# Patient Record
Sex: FEMALE | Race: WHITE | Employment: FULL TIME | URBAN - METROPOLITAN AREA
[De-identification: names, ages, dates, MRNs, and addresses within clinical notes are randomized per-mention and may not be internally consistent; named-entity substitution may affect disease eponyms.]

---

## 2020-03-04 ENCOUNTER — APPOINTMENT (OUTPATIENT)
Dept: NON INVASIVE DIAGNOSTICS | Age: 55
End: 2020-03-04
Payer: COMMERCIAL

## 2020-03-04 ENCOUNTER — APPOINTMENT (OUTPATIENT)
Dept: NUCLEAR MEDICINE | Age: 55
End: 2020-03-04
Payer: COMMERCIAL

## 2020-03-04 ENCOUNTER — HOSPITAL ENCOUNTER (OUTPATIENT)
Age: 55
Setting detail: OBSERVATION
Discharge: HOME | End: 2020-03-04
Attending: EMERGENCY MEDICINE | Admitting: INTERNAL MEDICINE
Payer: COMMERCIAL

## 2020-03-04 ENCOUNTER — APPOINTMENT (OUTPATIENT)
Dept: GENERAL RADIOLOGY | Age: 55
End: 2020-03-04
Payer: COMMERCIAL

## 2020-03-04 VITALS
BODY MASS INDEX: 38.57 KG/M2 | SYSTOLIC BLOOD PRESSURE: 146 MMHG | TEMPERATURE: 97.4 F | RESPIRATION RATE: 20 BRPM | OXYGEN SATURATION: 96 % | HEIGHT: 66 IN | HEART RATE: 67 BPM | DIASTOLIC BLOOD PRESSURE: 72 MMHG | WEIGHT: 240 LBS

## 2020-03-04 PROBLEM — Z87.891 FORMER SMOKER: Status: ACTIVE | Noted: 2020-03-04

## 2020-03-04 PROBLEM — I10 ESSENTIAL HYPERTENSION: Status: ACTIVE | Noted: 2020-03-04

## 2020-03-04 PROBLEM — E11.9 DIABETES MELLITUS (HCC): Status: ACTIVE | Noted: 2020-03-04

## 2020-03-04 PROBLEM — G47.30 SLEEP APNEA: Status: ACTIVE | Noted: 2020-03-04

## 2020-03-04 PROBLEM — R07.9 CHEST PAIN: Status: ACTIVE | Noted: 2020-03-04

## 2020-03-04 LAB
ANION GAP SERPL CALCULATED.3IONS-SCNC: 11 MMOL/L (ref 7–16)
BASOPHILS ABSOLUTE: 0.03 E9/L (ref 0–0.2)
BASOPHILS RELATIVE PERCENT: 0.5 % (ref 0–2)
BUN BLDV-MCNC: 17 MG/DL (ref 6–20)
CALCIUM SERPL-MCNC: 9.1 MG/DL (ref 8.6–10.2)
CHLORIDE BLD-SCNC: 105 MMOL/L (ref 98–107)
CHOLESTEROL, TOTAL: 110 MG/DL (ref 0–199)
CO2: 24 MMOL/L (ref 22–29)
CREAT SERPL-MCNC: 0.5 MG/DL (ref 0.5–1)
D DIMER: <200 NG/ML DDU
EKG ATRIAL RATE: 70 BPM
EKG P AXIS: 52 DEGREES
EKG P-R INTERVAL: 180 MS
EKG Q-T INTERVAL: 374 MS
EKG QRS DURATION: 82 MS
EKG QTC CALCULATION (BAZETT): 403 MS
EKG R AXIS: 6 DEGREES
EKG T AXIS: 20 DEGREES
EKG VENTRICULAR RATE: 70 BPM
EOSINOPHILS ABSOLUTE: 0.09 E9/L (ref 0.05–0.5)
EOSINOPHILS RELATIVE PERCENT: 1.5 % (ref 0–6)
GFR AFRICAN AMERICAN: >60
GFR NON-AFRICAN AMERICAN: >60 ML/MIN/1.73
GLUCOSE BLD-MCNC: 146 MG/DL (ref 74–99)
HCT VFR BLD CALC: 39.8 % (ref 34–48)
HDLC SERPL-MCNC: 49 MG/DL
HEMOGLOBIN: 13.3 G/DL (ref 11.5–15.5)
IMMATURE GRANULOCYTES #: 0.02 E9/L
IMMATURE GRANULOCYTES %: 0.3 % (ref 0–5)
INR BLD: 1
LACTIC ACID: 0.8 MMOL/L (ref 0.5–2.2)
LDL CHOLESTEROL CALCULATED: 52 MG/DL (ref 0–99)
LV EF: 78 %
LVEF MODALITY: NORMAL
LYMPHOCYTES ABSOLUTE: 1.51 E9/L (ref 1.5–4)
LYMPHOCYTES RELATIVE PERCENT: 25.3 % (ref 20–42)
MCH RBC QN AUTO: 30 PG (ref 26–35)
MCHC RBC AUTO-ENTMCNC: 33.4 % (ref 32–34.5)
MCV RBC AUTO: 89.6 FL (ref 80–99.9)
MONOCYTES ABSOLUTE: 0.34 E9/L (ref 0.1–0.95)
MONOCYTES RELATIVE PERCENT: 5.7 % (ref 2–12)
NEUTROPHILS ABSOLUTE: 3.99 E9/L (ref 1.8–7.3)
NEUTROPHILS RELATIVE PERCENT: 66.7 % (ref 43–80)
PDW BLD-RTO: 12.9 FL (ref 11.5–15)
PLATELET # BLD: 150 E9/L (ref 130–450)
PMV BLD AUTO: 9.9 FL (ref 7–12)
POTASSIUM REFLEX MAGNESIUM: 4 MMOL/L (ref 3.5–5)
PROTHROMBIN TIME: 11.1 SEC (ref 9.3–12.4)
RBC # BLD: 4.44 E12/L (ref 3.5–5.5)
SODIUM BLD-SCNC: 140 MMOL/L (ref 132–146)
TOTAL CK: 92 U/L (ref 20–180)
TRIGL SERPL-MCNC: 45 MG/DL (ref 0–149)
TROPONIN: <0.01 NG/ML (ref 0–0.03)
TROPONIN: <0.01 NG/ML (ref 0–0.03)
VLDLC SERPL CALC-MCNC: 9 MG/DL
WBC # BLD: 6 E9/L (ref 4.5–11.5)

## 2020-03-04 PROCEDURE — G0378 HOSPITAL OBSERVATION PER HR: HCPCS

## 2020-03-04 PROCEDURE — 94760 N-INVAS EAR/PLS OXIMETRY 1: CPT

## 2020-03-04 PROCEDURE — 93016 CV STRESS TEST SUPVJ ONLY: CPT | Performed by: INTERNAL MEDICINE

## 2020-03-04 PROCEDURE — 82550 ASSAY OF CK (CPK): CPT

## 2020-03-04 PROCEDURE — A9500 TC99M SESTAMIBI: HCPCS | Performed by: RADIOLOGY

## 2020-03-04 PROCEDURE — 99244 OFF/OP CNSLTJ NEW/EST MOD 40: CPT | Performed by: INTERNAL MEDICINE

## 2020-03-04 PROCEDURE — 6370000000 HC RX 637 (ALT 250 FOR IP): Performed by: EMERGENCY MEDICINE

## 2020-03-04 PROCEDURE — APPSS45 APP SPLIT SHARED TIME 31-45 MINUTES: Performed by: NURSE PRACTITIONER

## 2020-03-04 PROCEDURE — 85378 FIBRIN DEGRADE SEMIQUANT: CPT

## 2020-03-04 PROCEDURE — 80061 LIPID PANEL: CPT

## 2020-03-04 PROCEDURE — 99236 HOSP IP/OBS SAME DATE HI 85: CPT | Performed by: INTERNAL MEDICINE

## 2020-03-04 PROCEDURE — 93017 CV STRESS TEST TRACING ONLY: CPT

## 2020-03-04 PROCEDURE — 80048 BASIC METABOLIC PNL TOTAL CA: CPT

## 2020-03-04 PROCEDURE — 93018 CV STRESS TEST I&R ONLY: CPT | Performed by: INTERNAL MEDICINE

## 2020-03-04 PROCEDURE — 84484 ASSAY OF TROPONIN QUANT: CPT

## 2020-03-04 PROCEDURE — 36415 COLL VENOUS BLD VENIPUNCTURE: CPT

## 2020-03-04 PROCEDURE — 78452 HT MUSCLE IMAGE SPECT MULT: CPT

## 2020-03-04 PROCEDURE — 85610 PROTHROMBIN TIME: CPT

## 2020-03-04 PROCEDURE — 3430000000 HC RX DIAGNOSTIC RADIOPHARMACEUTICAL: Performed by: RADIOLOGY

## 2020-03-04 PROCEDURE — 93005 ELECTROCARDIOGRAM TRACING: CPT | Performed by: EMERGENCY MEDICINE

## 2020-03-04 PROCEDURE — 71046 X-RAY EXAM CHEST 2 VIEWS: CPT

## 2020-03-04 PROCEDURE — 85025 COMPLETE CBC W/AUTO DIFF WBC: CPT

## 2020-03-04 PROCEDURE — 99285 EMERGENCY DEPT VISIT HI MDM: CPT

## 2020-03-04 PROCEDURE — 83605 ASSAY OF LACTIC ACID: CPT

## 2020-03-04 RX ORDER — NITROGLYCERIN 0.4 MG/1
0.4 TABLET SUBLINGUAL ONCE
Status: COMPLETED | OUTPATIENT
Start: 2020-03-04 | End: 2020-03-04

## 2020-03-04 RX ORDER — SODIUM CHLORIDE 0.9 % (FLUSH) 0.9 %
SYRINGE (ML) INJECTION
Status: DISCONTINUED
Start: 2020-03-04 | End: 2020-03-04 | Stop reason: HOSPADM

## 2020-03-04 RX ORDER — ACETAMINOPHEN 500 MG
1000 TABLET ORAL ONCE
Status: COMPLETED | OUTPATIENT
Start: 2020-03-04 | End: 2020-03-04

## 2020-03-04 RX ORDER — ASPIRIN 325 MG
325 TABLET ORAL ONCE
Status: COMPLETED | OUTPATIENT
Start: 2020-03-04 | End: 2020-03-04

## 2020-03-04 RX ADMIN — Medication 10 MILLICURIE: at 14:34

## 2020-03-04 RX ADMIN — ACETAMINOPHEN 1000 MG: 500 TABLET ORAL at 09:52

## 2020-03-04 RX ADMIN — Medication 30 MILLICURIE: at 14:34

## 2020-03-04 RX ADMIN — ASPIRIN 325 MG: 325 TABLET, FILM COATED ORAL at 08:54

## 2020-03-04 RX ADMIN — NITROGLYCERIN 0.4 MG: 0.4 TABLET, ORALLY DISINTEGRATING SUBLINGUAL at 08:56

## 2020-03-04 ASSESSMENT — PAIN DESCRIPTION - FREQUENCY
FREQUENCY: INTERMITTENT
FREQUENCY: INTERMITTENT

## 2020-03-04 ASSESSMENT — PAIN SCALES - GENERAL
PAINLEVEL_OUTOF10: 1
PAINLEVEL_OUTOF10: 0
PAINLEVEL_OUTOF10: 1
PAINLEVEL_OUTOF10: 7

## 2020-03-04 ASSESSMENT — ENCOUNTER SYMPTOMS
DIARRHEA: 0
SINUS PAIN: 0
CHEST TIGHTNESS: 1
PHOTOPHOBIA: 0
ABDOMINAL PAIN: 0
SHORTNESS OF BREATH: 1
BACK PAIN: 0

## 2020-03-04 ASSESSMENT — PAIN DESCRIPTION - PAIN TYPE
TYPE: ACUTE PAIN
TYPE: ACUTE PAIN

## 2020-03-04 ASSESSMENT — PAIN DESCRIPTION - DESCRIPTORS: DESCRIPTORS: PATIENT UNABLE TO DESCRIBE

## 2020-03-04 ASSESSMENT — PAIN DESCRIPTION - LOCATION
LOCATION: CHEST
LOCATION: CHEST

## 2020-03-04 ASSESSMENT — PAIN DESCRIPTION - PROGRESSION
CLINICAL_PROGRESSION: NOT CHANGED
CLINICAL_PROGRESSION: NOT CHANGED

## 2020-03-04 NOTE — H&P
[facility]   History and Physical      CHIEF COMPLAINT:  Chest tightness/SOB    History of Present Illness:  48 y.o. female with a history of DM II ,MICKI, and HTN presents with chest tightness and SOB beginning this am. Pt complaint is mild in severity, intermittent, and worsened by nothing. Pt state last evening she developed a heart flutter \"like a butterfly\" that lasted for about 10 seconds or so. She awakened this morning with chest tightness and SOB. Pt states she had sleep study about 7 years ago, but has not been compliant with CPAP machine. She is a former smoker quit 2 years ago. Pt received Nitro Sl X 2 during ED course with relief of symptoms. Will admit for further evaluation and treatment. Informant(s) for H&P: Patient. Mom, and EMR    REVIEW OF SYSTEMS:  Pt admits to flutter of heart, SOB, and Chest tightness. Denies fevers, chills, n/v, ha, vision/hearing changes, wt changes, hot/cold flashes, other open skin lesions, diarrhea, constipation, dysuria/hematuria unless noted in HPI. Complete ROS performed with the patient and is otherwise negative. PMH:  Past Medical History:   Diagnosis Date    Diabetes mellitus ([address]     Essential hypertension     Essential tremor     Sleep apnea        Surgical History:  Past Surgical History:   Procedure Laterality Date     SECTION      FEMUR SURGERY Right        Medications Prior to Admission:    Prior to Admission medications    Not on File       Allergies:    Albuterol    Social History:          Family History:   family history includes Cancer in her father; Heart Attack in an other family member; Hypotension in her brother and mother; Hypothyroidism in her mother.      PHYSICAL EXAM:  Vitals:  BP (!) 146/72   Pulse 73   Temp 97.4 °F (36.3 °C)   Resp 14   Ht 5' 6\" (1.676 m)   Wt 240 lb (108.9 kg)   SpO2 96%   BMI 38.74 kg/m²     General Appearance: alert and oriented to person, place and time and in no acute distress  Skin: warm and dry  Head: normocephalic and atraumatic  Eyes: pupils equal, round, and reactive to light, extraocular eye movements intact, conjunctivae normal  Neck: neck supple and non tender without mass   Pulmonary/Chest: clear to auscultation bilaterally- no wheezes, rales or rhonchi, normal air movement, no respiratory distress  Cardiovascular: normal rate, normal S1 and S2. No rubs, gallops, or murmur noted. Abdomen: soft, non-tender, non-distended, normal bowel sounds, no masses or organomegaly  Extremities: no cyanosis, no clubbing. BLE edema  Neurologic: no cranial nerve deficit and speech normal    LABS:  Recent Labs     20  0847      K 4.0      CO2 24   BUN 17   CREATININE 0.5   GLUCOSE 146*   CALCIUM 9.1       Recent Labs     20  0847   WBC 6.0   RBC 4.44   HGB 13.3   HCT 39.8   MCV 89.6   MCH 30.0   MCHC 33.4   RDW 12.9      MPV 9.9      Ref. Range 3/4/2020 08:47   Total CK Latest Ref Range: 20 - 180 U/L 92   Troponin Latest Ref Range: 0.00 - 0.03 ng/mL <0.01       No results for input(s): POCGLU in the last 72 hours. Radiology: Xr Chest Standard (2 Vw)    Result Date: 3/4/2020  Patient MRN: [id number] : 1965 Age:  48 years Gender: Female Order Date: 3/4/2020 7:15 AM Exam: XR CHEST (2 VW) Number of Images: 2 views Indication:   chest pain chest pain Comparison: None. Findings: Examination of the chest in PA and lateral views shows that both lungs are free of active disease. The heart is normal in size and configuration. There is early uncoiling atherosclerotic change of thoracic aorta. The trachea is in the midline. The mediastinum and both hemidiaphragms are normal. The bony thorax is intact. NO ACUTE CARDIOPULMONARY PROCESS       EKG: Interpretation per ED Physician: SR no acute changes, nml axis    ASSESSMENT:      Active Problems:    Chest pain  Resolved Problems:    * No resolved hospital problems.  *      PLAN:    1. CP R/O ACS- Chest tightness/SOB this am. Nitro 0.4 Sl x 2 given in ED course. HEART score 3. Currently CP free. Troponin and EKG initially negative. Stress test today. Follow troponin. Lipid panel. Cardiology input appreciated. 2. HTN- Norvasc 5mg daily  3. OSAP-Sleep study about 7 years ago with compliance to CPAP about 1year. Currently non-compliant with CPAP at home. Counseled on compliance. 4. DM- Glucose 146. Currently on no medication. A1C.   5. Former smoker- Counseled continued cessation    Code Status: FULL  DVT prophylaxis: SCDs    NOTE: This report was transcribed using voice recognition software. Every effort was made to ensure accuracy; however, inadvertent computerized transcription errors may be present.     Electronically signed by Laverne Mendes.  Dar Pinzon - CNP on 3/4/2020 at 11:25 AM

## 2020-03-04 NOTE — CONSULTS
CHIEF COMPLAINT: Chest pain    HISTORY OF PRESENT ILLNESS: Patient is a 48 y.o. female seen at the request of Dr. Obi Plaza and not followed by cardiology. Patient complains of chest pain. Onset was 1 day ago, with improving course since that time. The patient describes the pain as intermittent, occurring with increasing frequency, precordial and pressure like in nature, does not radiate. Patient rates pain as a 5/10 in intensity. Associated symptoms are chest pain, chest pressure/discomfort and dyspnea. Aggravating factors are deep inspiration and position. Alleviating factors are: none. Patient's cardiac risk factors are diabetes mellitus, hypertension, obesity (BMI >= 30 kg/m2), sedentary lifestyle and smoking/ tobacco exposure. Past Medical History:   Diagnosis Date    [facility]     Essential hypertension     Essential tremor     Sleep apnea        Patient Active Problem List   Diagnosis    Chest pain, unspecified    [facility] mellitus [facility]    Essential hypertension    Sleep apnea    Former smoker       Allergies   Allergen Reactions    Albuterol        Current Facility-Administered Medications   Medication Dose Route Frequency Provider Last Rate Last Dose    sodium chloride flush 0.9 % injection             technetium sestamibi (CARDIOLITE) injection 10 millicurie  10 millicurie Intravenous ONCE PRN Jesenia Costa II, MD        technetium sestamibi (CARDIOLITE) injection 30 millicurie  30 millicurie Intravenous ONCE PRN John Leon MD         No current outpatient medications on file.        Social History     Socioeconomic History    Marital status:      Spouse name: Not on file    Number of children: Not on file    Years of education: Not on file    Highest education level: Not on file   Occupational History    Not on file   Social Needs    Financial resource strain: Not on file    Food insecurity:     Worry: Not on file     Inability: Not on file  Transportation needs:     Medical: Not on file     Non-medical: Not on file   Tobacco Use    Smoking status: Not on file   Substance and Sexual Activity    Alcohol use: Not on file    Drug use: Not on file    Sexual activity: Not on file   Lifestyle    Physical activity:     Days per week: Not on file     Minutes per session: Not on file    Stress: Not on file   Relationships    Social connections:     Talks on phone: Not on file     Gets together: Not on file     Attends Shinto service: Not on file     Active member of club or organization: Not on file     Attends meetings of clubs or organizations: Not on file     Relationship status: Not on file    Intimate partner violence:     Fear of current or ex partner: Not on file     Emotionally abused: Not on file     Physically abused: Not on file     Forced sexual activity: Not on file   Other Topics Concern    Not on file   Social History Narrative    Not on file       Family History   Problem Relation Age of Onset    Hypothyroidism Mother     Hypotension Mother     Cancer Father         gallbladder  52yr    Hypotension Brother     Heart Attack Other         Uncle on father's side  age 44       Review of Systems:   Heart: as above   Lungs: as above   Eyes: denies changes in vision or discharge. Ears: denies changes in hearing or pain. Nose: denies epistaxis or masses   Throat: denies sore throat or trouble swallowing. Neuro: denies numbness, tingling, tremors. Skin: denies rashes or itching. : denies hematuria, dysuria   GI: denies vomiting, diarrhea   Psych: denies mood changed, anxiety, depression. all others negative. Physical Exam   BP (!) 146/72   Pulse 67   Temp 97.4 °F (36.3 °C)   Resp 20   Ht 5' 6\" (1.676 m)   Wt 240 lb (108.9 kg)   SpO2 96%   BMI 38.74 kg/m²   Constitutional: Oriented to person, place, and time. Well-developed and well-nourished. No distress. Head: Normocephalic and atraumatic. Diagnosis    Chest pain, unspecified    Diabetes mellitus ([facility]    Essential hypertension    Sleep apnea    Former smoker     1. Chest pain: EKG and CE okay. D-dimer normal.    ESR. For stress. Echo.    2. DM: Per Primary service. 3. HTN: Observe. Melisa Goncalves D.O.   Cardiologist  Cardiology, [facility]

## 2020-03-04 NOTE — ED PROVIDER NOTES
Ann Ch is a 48 y.o. female presenting to the ED for chest pain, beginning this am ago. The complaint has been persistent, mild in severity, and worsened by nothing. 72-year-old female with history of diabetes and obstructive sleep apnea. She presents for chest tightness and shortness of breath that began this morning. She states the shortness of breath resolved she states the chest pain is very mild at this time she described as a tightness. She thinks the shortness of breath brought it on. She denies any exertional symptoms there is nothing reproducible to touch or rotation or movement. She denies any nausea vomiting denies any headache any neck pain any back pain any abdominal pain any diarrhea any melena hematochezia hemoptysis or hematemesis. She is never had a stress test.    Review of Systems:   Pertinent positives and negatives are stated within HPI, all other systems reviewed and are negative. Review of Systems   Constitutional: Negative for activity change and chills. HENT: Negative for congestion and sinus pain. Eyes: Negative for photophobia and visual disturbance. Respiratory: Positive for chest tightness and shortness of breath. Cardiovascular: Positive for chest pain and leg swelling. Gastrointestinal: Negative for abdominal pain and diarrhea. Genitourinary: Negative for difficulty urinating and flank pain. Musculoskeletal: Negative for back pain and neck pain. Neurological: Negative for dizziness, numbness and headaches. Hematological: Negative for adenopathy. Psychiatric/Behavioral: Negative for agitation and behavioral problems. + anxiety             --------------------------------------------- PAST HISTORY ---------------------------------------------  Past Medical History:  has a past medical history of [facility] Essential hypertension, Essential tremor, and Sleep apnea.     Past Surgical History:  has a past surgical history that includes Femur Surgery (Right) and  section. Social History:      Family History: family history is not on file. The patients home medications have been reviewed.     Allergies: Albuterol    -------------------------------------------------- RESULTS -------------------------------------------------  All laboratory and radiology results have been personally reviewed by myself   LABS:  Results for orders placed or performed during the hospital encounter of 20   Lactic Acid, Plasma   Result Value Ref Range    Lactic Acid 0.8 0.5 - 2.2 mmol/L   Protime-INR   Result Value Ref Range    Protime 11.1 9.3 - 12.4 sec    INR 1.0    D-Dimer, Quantitative   Result Value Ref Range    D-Dimer, Quant <200 ng/mL DDU   Troponin   Result Value Ref Range    Troponin <0.01 0.00 - 0.03 ng/mL   Basic Metabolic Panel w/ Reflex to MG   Result Value Ref Range    Sodium 140 132 - 146 mmol/L    Potassium reflex Magnesium 4.0 3.5 - 5.0 mmol/L    Chloride 105 98 - 107 mmol/L    CO2 24 22 - 29 mmol/L    Anion Gap 11 7 - 16 mmol/L    Glucose 146 (H) 74 - 99 mg/dL    BUN 17 6 - 20 mg/dL    CREATININE 0.5 0.5 - 1.0 mg/dL    GFR Non-African American >60 >=60 mL/min/1.73    GFR African American >60     Calcium 9.1 8.6 - 10.2 mg/dL   CBC Auto Differential   Result Value Ref Range    WBC 6.0 4.5 - 11.5 E9/L    RBC 4.44 3.50 - 5.50 E12/L    Hemoglobin 13.3 11.5 - 15.5 g/dL    Hematocrit 39.8 34.0 - 48.0 %    MCV 89.6 80.0 - 99.9 fL    MCH 30.0 26.0 - 35.0 pg    MCHC 33.4 32.0 - 34.5 %    RDW 12.9 11.5 - 15.0 fL    Platelets 887 018 - 005 E9/L    MPV 9.9 7.0 - 12.0 fL    Neutrophils % 66.7 43.0 - 80.0 %    Immature Granulocytes % 0.3 0.0 - 5.0 %    Lymphocytes % 25.3 20.0 - 42.0 %    Monocytes % 5.7 2.0 - 12.0 %    Eosinophils % 1.5 0.0 - 6.0 %    Basophils % 0.5 0.0 - 2.0 %    Neutrophils Absolute 3.99 1.80 - 7.30 E9/L    Immature Granulocytes # 0.02 E9/L    Lymphocytes Absolute 1.51 1.50 - 4.00 E9/L    Monocytes patient for All medications dispensed and given in department as well prescriptions prescribed for home, . The patient elected to take the medicine. Pt instructed on warning signs and precautions for medication side effects. The patient was given warning signs for when to seek medical attention. Counseling: The emergency provider has spoken with the patient and discussed todays results, in addition to providing specific details for the plan of care and counseling regarding the diagnosis and prognosis. Questions are answered at this time and they are agreeable with the plan.      --------------------------------- IMPRESSION AND DISPOSITION ---------------------------------    IMPRESSION  1. Chest pain, unspecified type        DISPOSITION  Disposition: Admit to telemetry  Patient condition is fair      NOTE: This report was transcribed using voice recognition software.  Every effort was made to ensure accuracy; however, inadvertent computerized transcription errors may be present       Omar Mayer DO  03/04/20 2780

## 2020-03-04 NOTE — DISCHARGE SUMMARY
[facility] Hospitalist       Hospitalist Physician Discharge Summary           Schedule an appointment as soon as possible for a visit  [phone] to make an appointment with a primary care doctor     DO Som[address]  [address] [facility],[address] [phone]    In 1 week  Call for follow up appointment      Activity level: AS tolerated    Diet: Carb controlled         Condition at discharge: astable    Dispo:Home    Patient ID:  Ambreen Stewart  [id number]  48 y.o.  1965    Admit date: 3/4/2020    Discharge date and time:  3/4/2020  5:06 PM    Admission Diagnoses: Principal Problem:    Chest pain, unspecified  Active Problems:    Diabetes mellitus ([address]    Essential hypertension    Sleep apnea    Former smoker  Resolved Problems:    * No resolved hospital problems. *      Discharge Diagnoses: Principal Problem:    Chest pain, unspecified  Active Problems:    Diabetes mellitus ([address]    Essential hypertension    Sleep apnea    Former smoker  Resolved Problems:    * No resolved hospital problems. *      Consults:  IP CONSULT TO CARDIOLOGY    Procedures:     Exercise Treadmill Test Stress Report:     Dx CP     Baseline EKG: normal sinus rhythm, nonspecific ST and T waves changes.     Workload: 5:59 minutes to 150 BPM which represents 89% of MPHR. 6.9 METs     Stress EKG: No ST-T changes.     Arrhythmias: None.      Symptoms: None.     Huber Treadmill score is 6.     Summary:  Negative ETT to an average workload.     Huber treadmill score is 6 portending a low risk of cardiovascular event.     Conchis Bassett D.O. Cardiologist  Cardiology, [facility] Course:     48year old  female with a history of DM, MICKI, and HTN presented to [facility] ED with complaints of chest tightness and SOB. Beginning this am.Pt state last evening she developed a heart flutter \"like a butterfly\" that lasted for about 10 seconds or so.  She awakened this morning with chest Gender: Female Order Date: 3/4/2020 7:15 AM Exam: XR CHEST (2 VW) Number of Images: 2 views Indication:   chest pain chest pain Comparison: None. Findings: Examination of the chest in PA and lateral views shows that both lungs are free of active disease. The heart is normal in size and configuration. There is early uncoiling atherosclerotic change of thoracic aorta. The trachea is in the midline. The mediastinum and both hemidiaphragms are normal. The bony thorax is intact. NO ACUTE CARDIOPULMONARY PROCESS     Nm Cardiac Stress Test Nuclear Imaging    Result Date: 3/4/2020  Clinical indications: Chest pain. Myocardial infarction. COMPARISON: None. Exposure control: This examination and all examinations utilizing ionizing radiation at this facility done so according to the ALARA (as low as reasonably achievable) principal for radiation dose reduction. TECHNIQUE: Nuclear medicine SPECT, wall motion, cardiac perfusion and ejection fraction study. For the rest portion of the examination, 11.5 mCi of technetium 99 sestamibi were injected intravenously into the right forearm IV. For the stress portion of the examination, 31.3 mCi of technetium 99 sestamibi were injected into the right forearm IV. This was accompanied by treadmill testing using the Johnathan protocol. FINDINGS: There is no evidence for fixed or reversible perfusion defect. Wall motion is normal as is ejection fraction at 78%     Normal myocardial perfusion, ejection fraction and wall motion study. Patient Instructions:   Current Discharge Medication List      CONTINUE these medications which have NOT CHANGED    Details   metFORMIN (GLUCOPHAGE) 500 MG tablet Take 500 mg by mouth 2 times daily (with meals)               Note that More than 30 minutes was spent in preparing discharge papers, discussing discharge with patient, medication review, etc.    NOTE: This report was transcribed using voice recognition software.  Every effort was made to ensure

## 2020-03-04 NOTE — PROGRESS NOTES
[facility] Med Surg/Tele  [address]  [facility] [address]  Phone: [phone]             March 4, 2020    Patient: Ann Ch   YOB: 1965   Date of Visit: 3/4/2020       To Whom It May Concern:    Ann Ch was seen and treated in our facility  beginning 3/4/2020 until 3/4/2020 @4461       Sincerely,       Andrews Hernández RN         Signature:__________________________________

## 2020-03-31 ENCOUNTER — TELEPHONE (OUTPATIENT)
Dept: PRIMARY CARE CLINIC | Age: 55
End: 2020-03-31

## 2020-04-27 ENCOUNTER — OFFICE VISIT (OUTPATIENT)
Dept: FAMILY MEDICINE CLINIC | Age: 55
End: 2020-04-27
Payer: COMMERCIAL

## 2020-04-27 VITALS
SYSTOLIC BLOOD PRESSURE: 122 MMHG | BODY MASS INDEX: 34.54 KG/M2 | DIASTOLIC BLOOD PRESSURE: 68 MMHG | TEMPERATURE: 97.5 F | HEART RATE: 75 BPM | OXYGEN SATURATION: 97 % | WEIGHT: 214 LBS

## 2020-04-27 PROCEDURE — G8417 CALC BMI ABV UP PARAM F/U: HCPCS | Performed by: FAMILY MEDICINE

## 2020-04-27 PROCEDURE — 99213 OFFICE O/P EST LOW 20 MIN: CPT | Performed by: FAMILY MEDICINE

## 2020-04-27 PROCEDURE — 4004F PT TOBACCO SCREEN RCVD TLK: CPT | Performed by: FAMILY MEDICINE

## 2020-04-27 PROCEDURE — G8427 DOCREV CUR MEDS BY ELIG CLIN: HCPCS | Performed by: FAMILY MEDICINE

## 2020-04-27 PROCEDURE — 3017F COLORECTAL CA SCREEN DOC REV: CPT | Performed by: FAMILY MEDICINE

## 2020-04-27 ASSESSMENT — VISUAL ACUITY: OU: 1

## 2020-04-27 ASSESSMENT — ENCOUNTER SYMPTOMS
RESPIRATORY NEGATIVE: 1
PHOTOPHOBIA: 1
EYE REDNESS: 1
EYE PAIN: 1

## 2020-04-27 NOTE — PROGRESS NOTES
20  Brinda Lobo : 1965 Sex: female  Age: 48 y.o. Chief Complaint   Patient presents with    Eye Pain     after cleaning saturday/ red irritated       This patient is a 53-year-old white female with a chief complaint of eye pain after cleaning Saturday which was 2 days prior to this visit. The area of the eye is irritated and extremely erythematous and painful. Has had no fevers chills cough or shortness of breath no discharge from the eye. Review of Systems   Constitutional: Negative. Eyes: Positive for photophobia, pain, redness and visual disturbance. Respiratory: Negative. Current Outpatient Medications:     Tobramycin-Dexamethasone 0.3-0.05 % SUSP, Apply 1 drop to eye 2 times daily, Disp: 1 Bottle, Rfl: 0    metFORMIN (GLUCOPHAGE) 500 MG tablet, Take 500 mg by mouth 2 times daily (with meals), Disp: , Rfl:   Allergies   Allergen Reactions    Albuterol        Past Medical History:   Diagnosis Date    Diabetes mellitus ([facility]     Essential hypertension     Essential tremor     Sleep apnea      Past Surgical History:   Procedure Laterality Date     SECTION      FEMUR SURGERY Right      Family History   Problem Relation Age of Onset    Hypothyroidism Mother     Hypotension Mother     Cancer Father         gallbladder  52yr    Hypotension Brother     Heart Attack Other         Uncle on father's side  age 44     Social History     Tobacco Use    Smoking status: Former Smoker     Last attempt to quit: 3/4/2018     Years since quittin.1   Substance Use Topics    Alcohol use: Not on file    Drug use: Not on file        Vitals:    20 0948   BP: 122/68   Pulse: 75   Temp: 97.5 °F (36.4 °C)   SpO2: 97%   Weight: 214 lb (97.1 kg)       Physical Exam  Constitutional:       Appearance: Normal appearance. Eyes:      General: Lids are normal. Lids are everted, no foreign bodies appreciated. Vision grossly intact.          Right

## 2020-06-11 ENCOUNTER — TELEPHONE (OUTPATIENT)
Dept: ADMINISTRATIVE | Age: 55
End: 2020-06-11

## 2020-06-11 NOTE — TELEPHONE ENCOUNTER
Patient called in asking if she can establish care with Dr. Mario Gomez. Her mother is a current patient of Dr Kandis Castro. Patient's prior PCP is . Please advise if patient may establish.       Thank you    brigid

## 2020-07-07 ENCOUNTER — TELEPHONE (OUTPATIENT)
Dept: ADMINISTRATIVE | Age: 55
End: 2020-07-07

## 2020-07-07 ENCOUNTER — NURSE TRIAGE (OUTPATIENT)
Dept: OTHER | Facility: CLINIC | Age: 55
End: 2020-07-07

## 2020-07-07 ENCOUNTER — OFFICE VISIT (OUTPATIENT)
Dept: FAMILY MEDICINE CLINIC | Age: 55
End: 2020-07-07
Payer: COMMERCIAL

## 2020-07-07 ENCOUNTER — HOSPITAL ENCOUNTER (OUTPATIENT)
Age: 55
Discharge: HOME | End: 2020-07-09
Payer: COMMERCIAL

## 2020-07-07 VITALS
WEIGHT: 243 LBS | RESPIRATION RATE: 20 BRPM | DIASTOLIC BLOOD PRESSURE: 84 MMHG | BODY MASS INDEX: 39.05 KG/M2 | TEMPERATURE: 97.5 F | HEART RATE: 90 BPM | HEIGHT: 66 IN | OXYGEN SATURATION: 98 % | SYSTOLIC BLOOD PRESSURE: 144 MMHG

## 2020-07-07 LAB
ALBUMIN SERPL-MCNC: 4.6 G/DL (ref 3.5–5.2)
ALP BLD-CCNC: 69 U/L (ref 35–104)
ALT SERPL-CCNC: 36 U/L (ref 0–32)
ANION GAP SERPL CALCULATED.3IONS-SCNC: 15 MMOL/L (ref 7–16)
AST SERPL-CCNC: 36 U/L (ref 0–31)
BASOPHILS ABSOLUTE: 0.03 E9/L (ref 0–0.2)
BASOPHILS RELATIVE PERCENT: 0.4 % (ref 0–2)
BILIRUB SERPL-MCNC: 0.5 MG/DL (ref 0–1.2)
BUN BLDV-MCNC: 15 MG/DL (ref 6–20)
CALCIUM SERPL-MCNC: 9.3 MG/DL (ref 8.6–10.2)
CHLORIDE BLD-SCNC: 103 MMOL/L (ref 98–107)
CO2: 24 MMOL/L (ref 22–29)
CREAT SERPL-MCNC: 0.6 MG/DL (ref 0.5–1)
EOSINOPHILS ABSOLUTE: 0.09 E9/L (ref 0.05–0.5)
EOSINOPHILS RELATIVE PERCENT: 1.3 % (ref 0–6)
GFR AFRICAN AMERICAN: >60
GFR NON-AFRICAN AMERICAN: >60 ML/MIN/1.73
GLUCOSE BLD-MCNC: 162 MG/DL (ref 74–99)
HCT VFR BLD CALC: 45 % (ref 34–48)
HEMOGLOBIN: 14.5 G/DL (ref 11.5–15.5)
IMMATURE GRANULOCYTES #: 0.04 E9/L
IMMATURE GRANULOCYTES %: 0.6 % (ref 0–5)
LYMPHOCYTES ABSOLUTE: 1.17 E9/L (ref 1.5–4)
LYMPHOCYTES RELATIVE PERCENT: 16.4 % (ref 20–42)
MCH RBC QN AUTO: 29.7 PG (ref 26–35)
MCHC RBC AUTO-ENTMCNC: 32.2 % (ref 32–34.5)
MCV RBC AUTO: 92 FL (ref 80–99.9)
MONOCYTES ABSOLUTE: 0.32 E9/L (ref 0.1–0.95)
MONOCYTES RELATIVE PERCENT: 4.5 % (ref 2–12)
NEUTROPHILS ABSOLUTE: 5.49 E9/L (ref 1.8–7.3)
NEUTROPHILS RELATIVE PERCENT: 76.8 % (ref 43–80)
PDW BLD-RTO: 13.2 FL (ref 11.5–15)
PLATELET # BLD: 170 E9/L (ref 130–450)
PMV BLD AUTO: 11.2 FL (ref 7–12)
POTASSIUM SERPL-SCNC: 4.6 MMOL/L (ref 3.5–5)
RBC # BLD: 4.89 E12/L (ref 3.5–5.5)
SODIUM BLD-SCNC: 142 MMOL/L (ref 132–146)
TOTAL PROTEIN: 6.9 G/DL (ref 6.4–8.3)
TSH SERPL DL<=0.05 MIU/L-ACNC: 0.7 UIU/ML (ref 0.27–4.2)
WBC # BLD: 7.1 E9/L (ref 4.5–11.5)

## 2020-07-07 PROCEDURE — 3017F COLORECTAL CA SCREEN DOC REV: CPT | Performed by: PHYSICIAN ASSISTANT

## 2020-07-07 PROCEDURE — 84443 ASSAY THYROID STIM HORMONE: CPT

## 2020-07-07 PROCEDURE — 80053 COMPREHEN METABOLIC PANEL: CPT

## 2020-07-07 PROCEDURE — G8417 CALC BMI ABV UP PARAM F/U: HCPCS | Performed by: PHYSICIAN ASSISTANT

## 2020-07-07 PROCEDURE — 36415 COLL VENOUS BLD VENIPUNCTURE: CPT

## 2020-07-07 PROCEDURE — 85025 COMPLETE CBC W/AUTO DIFF WBC: CPT

## 2020-07-07 PROCEDURE — 99214 OFFICE O/P EST MOD 30 MIN: CPT | Performed by: PHYSICIAN ASSISTANT

## 2020-07-07 PROCEDURE — G8427 DOCREV CUR MEDS BY ELIG CLIN: HCPCS | Performed by: PHYSICIAN ASSISTANT

## 2020-07-07 PROCEDURE — 4004F PT TOBACCO SCREEN RCVD TLK: CPT | Performed by: PHYSICIAN ASSISTANT

## 2020-07-07 RX ORDER — HYDROXYZINE PAMOATE 25 MG/1
25 CAPSULE ORAL 3 TIMES DAILY PRN
Qty: 30 CAPSULE | Refills: 0 | Status: SHIPPED | OUTPATIENT
Start: 2020-07-07 | End: 2020-07-17

## 2020-07-07 SDOH — HEALTH STABILITY: MENTAL HEALTH: HOW OFTEN DO YOU HAVE A DRINK CONTAINING ALCOHOL?: 2-4 TIMES A MONTH

## 2020-07-07 NOTE — TELEPHONE ENCOUNTER
been any new stress or recent changes in your life? \"       Birthday   11. CAFFEINE ABUSE: \"Do you drink caffeinated beverages, and how much each day? \" (e.g., coffee, tea, jarrell)        Drinks energy drinks  12. SUBSTANCE ABUSE: \"Do you use any illegal drugs or alcohol? \"       Doesn't usually drink, had some drinks last weekend  13. OTHER SYMPTOMS: \"Do you have any other physical symptoms right now? \" (e.g., chest pain, palpitations, difficulty breathing, fever)        Feels closed in at home   14. PREGNANCY: \"Is there any chance you are pregnant? \" \"When was your last menstrual period? \"        no    Protocols used: ANXIETY AND PANIC ATTACK-ADULT-[place]    Went to see daughter in [facility] and started having anxiety attacks. Woke up today very anxious. Has an appointment on July 20th. With new PCP Recommend to go to the [place] [facility] to get some help today and she agrees. .      Received call from [facility]. Please do not reply to the triage nurse through this encounter. Any subsequent communication should be directly with the patient.

## 2020-07-07 NOTE — LETTER
[place] [address]  Phone: [phone]  Fax: [place], [place]        July 10, 2020     Patient: Jose Dunlap   YOB: 1965   Date of Visit: 7/7/2020       To Whom It May Concern: It is my medical opinion that Jose Dunlap Patient was seen and evaluated in the office on 7/7/2020. If you have any questions or concerns, please don't hesitate to call.     Sincerely,        Amrita Henry III PA

## 2020-07-07 NOTE — PROGRESS NOTES
20  Jt Hickman : 1965 Sex: female  Age 48 y.o. Subjective:  Chief Complaint   Patient presents with    Anxiety     pt states she has been having anxiety starting in [facility]         HPI:   Jt Hickman , 48 y.o. female presents to [facility] care for evaluation of anxiety. The patient has a history of diabetes, hypertension, essential tremor and sleep apnea. The patient is not currently on any medication because her PCP had passed away about a year ago. The patient has been experiencing these panic attacks and anxiety attacks for about 5 to 6 months now. Patient was actually admitted in February with similar complaints when she was having some chest discomfort. The patient states that everything looked appropriate at the time and was discharged home. They never started on any medication. She has an appointment on 2020 for a follow-up. The patient is not having any fever, chills, nausea, vomiting. The patient states that she got worked up over the weekend. She was down visiting her daughter in [facility]. She did have some alcoholic beverages which is more than she typically does. The patient does not use any illicit drugs. The patient denies any suicidal homicidal ideation. The patient's not having any hallucinations. She is never been diagnosed with anxiety or panic attacks in the past.      ROS:   Unless otherwise stated in this report the patient's positive and negative responses for review of systems for constitutional, eyes, ENT, cardiovascular, respiratory, gastrointestinal, neurological, , musculoskeletal, and integument systems and related systems to the presenting problem are either stated in the history of present illness or were not pertinent or were negative for the symptoms and/or complaints related to the presenting medical problem. Positives and pertinent negatives as per HPI. All others reviewed and are negative.       PMH:     Past Medical History: Diagnosis Date    Diabetes mellitus ([facility] [address]     Essential hypertension     Essential tremor     Sleep apnea        Past Surgical History:   Procedure Laterality Date     SECTION      FEMUR SURGERY Right        Family History   Problem Relation Age of Onset    Hypothyroidism Mother     Hypotension Mother     Cancer Father         gallbladder  52yr    Hypotension Brother     Heart Attack Other         Uncle on father's side  age 44       Medications:   No current outpatient medications on file. Allergies: Allergies   Allergen Reactions    Albuterol        Social History:     Social History     Tobacco Use    Smoking status: Former Smoker     Last attempt to quit: 3/4/2018     Years since quittin.3    Smokeless tobacco: Current User   Substance Use Topics    Alcohol use: Yes     Frequency: 2-4 times a month    Drug use: Never       Patient lives at home. Physical Exam:     Vitals:    20 1030   BP: (!) 144/84   Pulse: 90   Resp: 20   Temp: 97.5 °F (36.4 °C)   SpO2: 98%   Weight: 243 lb (110.2 kg)   Height: 5' 6\" (1.676 m)       Exam:  Physical Exam  Nurses note and vital signs reviewed and patient is not hypoxic. General: The patient appears well and in no apparent distress. Patient is resting comfortably on cart. Skin: Warm, dry, no pallor noted. There is no rash noted. Head: Normocephalic, atraumatic. Eye: Normal conjunctiva  Ears, Nose, Mouth, and Throat: Oral mucosa is moist  Cardiovascular: Regular Rate and Rhythm  Respiratory: Patient is in no distress, no accessory muscle use, lungs are clear to auscultation, no wheezing, crackles or rhonchi  Back: Non-tender, no CVA tenderness bilaterally to percussion. GI: Normal bowel sounds, no tenderness to palpation, no masses appreciated. No rebound, guarding, or rigidity noted.   Musculoskeletal: The patient has no evidence of calf tenderness, no pitting edema, symmetrical pulses noted bilaterally  Neurological: A&O x4, normal speech  Psychological: The patient has good insight, she was tearful initially but the symptoms seem to improve, no homicidal or suicidal ideation, no hallucinations. Testing:     Laboratory studies pending      Medical Decision Making:     The patient on arrival does not appear to be in any apparent distress or discomfort. Vital signs reviewed and noted to be within normal limits. The patient initially was very tearful. The patient states that she has been going through some anxiety and panic attacks as of late. There is nothing seems to be triggering these. The patient does have a job at [employer] and states that she is an over [employer]. The patient likes her job. The patient lives at home with her mother. The patient does not do any illicit drugs. The patient is not suicidal homicidal at this time. The patient will have baseline laboratory studies performed. The patient will be started on Vistaril. We will have the patient follow-up with PCP. The patient states that she does not like the idea of being on a long-term medication. The patient was also recommended to go to therapy. The patient was offered a referral and states that she was given the name and number by the on-call service. The patient additionally was recommended to start exercising at least 30 minutes a day, modifying diet, and meditation. The patient understands plan has no other questions or concerns at this time. Clinical Impression:   Jennifer BAILEY was seen today for anxiety. Diagnoses and all orders for this visit:    Anxiety  -     CBC Auto Differential; Future  -     COMPREHENSIVE METABOLIC PANEL; Future  -     TSH; Future    Other orders  -     hydrOXYzine (VISTARIL) 25 MG capsule; Take 1 capsule by mouth 3 times daily as needed for Anxiety        The patient is to call for any concerns or return if any of the signs or symptoms worsen.  The patient is to follow-up with PCP in the next 2-3 days for repeat evaluation repeat assessment or go directly to the emergency department.      SIGNATURE: Kenyetta Salcedo III, PA-C    Greater than 50% of the time was spent face-to-face with the patient discussing different treatment options, differential diagnosis, obtaining laboratory studies, treatment plan, need for follow-up, and lifestyle modifications

## 2020-07-09 ENCOUNTER — TELEPHONE (OUTPATIENT)
Dept: FAMILY MEDICINE CLINIC | Age: 55
End: 2020-07-09

## 2020-07-09 ENCOUNTER — HOSPITAL ENCOUNTER (EMERGENCY)
Age: 55
Discharge: HOME | End: 2020-07-09
Payer: COMMERCIAL

## 2020-07-09 VITALS
TEMPERATURE: 97.5 F | OXYGEN SATURATION: 97 % | HEIGHT: 66 IN | DIASTOLIC BLOOD PRESSURE: 91 MMHG | BODY MASS INDEX: 38.09 KG/M2 | RESPIRATION RATE: 16 BRPM | SYSTOLIC BLOOD PRESSURE: 140 MMHG | WEIGHT: 237 LBS | HEART RATE: 80 BPM

## 2020-07-09 PROCEDURE — 99282 EMERGENCY DEPT VISIT SF MDM: CPT

## 2020-07-09 PROCEDURE — 6370000000 HC RX 637 (ALT 250 FOR IP): Performed by: PHYSICIAN ASSISTANT

## 2020-07-09 RX ORDER — LORAZEPAM 1 MG/1
.5-1 TABLET ORAL EVERY 8 HOURS PRN
Qty: 15 TABLET | Refills: 0 | Status: SHIPPED | OUTPATIENT
Start: 2020-07-09 | End: 2020-07-14

## 2020-07-09 RX ORDER — LORAZEPAM 1 MG/1
1 TABLET ORAL ONCE
Status: COMPLETED | OUTPATIENT
Start: 2020-07-09 | End: 2020-07-09

## 2020-07-09 RX ADMIN — LORAZEPAM 1 MG: 1 TABLET ORAL at 15:05

## 2020-07-09 NOTE — ED PROVIDER NOTES
[facility]     Department of Emergency Medicine   ED  Provider Note  Admit Date/RoomTime: 2020  1:46 PM  ED Room:      Chief Complaint   Anxiety (states ongoing- has an ben on the 25 to see doctor. states having panic attacks. was given vistiril but not really better Denies SI/HI)    History of Present Illness      Adithya Banks is a 48 y.o. old female who presents to the ED for anxiety. Patient states she does have a history of this but has never seen a therapist.  She states she used to get frequent panic attacks several years ago. .  She is currently taking Vistaril every 8 hours. She states she was prescribed this by [facility] urgent care as she cannot get into her new family doctor Eze Quinn) until . Patient is denying any suicidal or homicidal ideation. She has no auditory visual hallucinations. Patient is denying chest pain, shortness of breath, abdominal pain, nausea vomiting, diarrhea, limb pain or swelling, fever/chills, cough, congestion, rash, headache, dizziness, or recent trauma/injury. She is alert and oriented x3 and in no apparent distress at this exam.  Patient is mildly anxious. She is cooperative and nontoxic-appearing. Patient states she is just looking for outpatient referrals. ROS   Pertinent positives and negatives are stated within HPI, all other systems reviewed and are negative. Past Medical History:  has a past medical history of Diabetes mellitus ([address] Essential hypertension, Essential tremor, and Sleep apnea. Past Surgical History:  has a past surgical history that includes Femur Surgery (Right) and  section. Social History:  reports that she quit smoking about 2 years ago. She uses smokeless tobacco. She reports current alcohol use. She reports that she does not use drugs.     Family History: family history includes Cancer in her father; Heart Attack in an other family member; Hypotension in her brother and mother; Hypothyroidism in her mother. The patients home medications have been reviewed. Allergies: Albuterol  Allergies have been reviewed with patient. Physical Exam   VS:  BP (!) 140/91   Pulse 80   Temp 97.5 °F (36.4 °C)   Resp 16   Ht 5' 6\" (1.676 m)   Wt 237 lb (107.5 kg)   SpO2 97%   BMI 38.25 kg/m²      Oxygen Saturation Interpretation: Normal.    General Appearance:  well-appearing, street clothes, fair grooming and fair hygiene. Constitutional: Alert and oriented x4, NAD, calm and cooperative     HEENT: Non-traumatic, No bruising or lacerations noted, EOMI, Airway patient. Neck: Supple. Non-tender with no meningeal signs    Respiratory:  Clear to auscultation and breath sounds equal. No distress   CV:  Regular rate and rhythm  GI:  Abdomen soft, nontender, No guarding or rigidity   Back:  No costovertebral tenderness. No midline tenderness   Integument:  Normal turgor. Warm, dry, without visible rash, unless noted elsewhere. Extremities: No tenderness or edema bilaterally   Neurological: CN II-XII grossly intact, Motor functions intact. Psychiatric:        Thought Process:       Coherent:  Yes. Delusions / Paranoia: no evidence of delusions and no evidence of paranoia. Flight of ideas:  No.         Rambling conversation:  No.       Affect: anxious. Suicidal ideation:  no suicidal ideation. Homicidal ideation:  no homicidal ideation. Perceptions:  denies any perceptual disturbance present. Lab / Imaging Results   (All laboratory and radiology results have been personally reviewed by myself)  Labs:  No results found for this visit on 07/09/20. Imaging: All Radiology results interpreted by Radiologist unless otherwise noted. No orders to display     ED Course / Medical Decision Making     Medications   LORazepam (ATIVAN) tablet 1 mg (1 mg Oral Given 7/9/20 4758)     Re-examination:   Patients symptoms are improving. She is resting comfortably in bed with no distress.  She continues to deny SI/HI     Consult(s):  IP CONSULT TO SOCIAL WORK    Procedure(s):  None    MDM:       Counseling: The emergency provider has spoken with the patient/caregiver and/or jacoban and discussed todays results, in addition to providing specific details for the plan of care and counseling regarding the diagnosis and prognosis. Questions are answered at this time and they are agreeable with the plan. Patient understands they must follow up with PCP and psychiatry. They were advised to return to the ED if symptoms worsen or if new symptoms develop. Pt remained nontoxic, afebrile, and A&O x4 during this ED visit. They agreed with plan of care, discharge, and importance of follow-up. Pt was in no distress at discharge. Vitals stable. Patient was educated on newly prescribed medications. At this time the patient is without objective evidence of an acute process requiring hospitalization or inpatient management. They have remained hemodynamically stable throughout their entire ED visit and are stable for discharge with outpatient follow-up. Assessment      1. Generalized anxiety disorder with panic attacks      Plan   Discharge to home  Patient condition is good    New Medications     New Prescriptions    LORAZEPAM (ATIVAN) 1 MG TABLET    Take 0.5-1 tablets by mouth every 8 hours as needed for Anxiety for up to 5 days. Electronically signed by Skylar Quarles PA-C   DD: 7/9/20  **This report was transcribed using voice recognition software. Every effort was made to ensure accuracy; however, inadvertent computerized transcription errors may be present.     END OF ED PROVIDER NOTE        Skylar Quarles PA-C  07/09/20 1544

## 2020-07-09 NOTE — TELEPHONE ENCOUNTER
If the patient is experiencing all of the symptoms I would recommend the emergency department for further evaluation and assessment. Her laboratory studies were unremarkable. Other than that can you check to see if her PCP has any availability soon and to see her?

## 2020-07-09 NOTE — ED NOTES
Assumed care of patient. Intyroduced self to patient as RN. Patient states she feels ok now.  Awaiting disposition and      Ekaterina Marcos RN  07/09/20 8677

## 2020-07-09 NOTE — TELEPHONE ENCOUNTER
Rina calling to tell you that she is not doing well right now. The medication you gave her kicks in, but doesn't last long, and then she is sobbing and anxious and scared. She cant get in to see Heidy Tse for another 11 days.   What do you suggest?

## 2020-07-20 ENCOUNTER — OFFICE VISIT (OUTPATIENT)
Dept: FAMILY MEDICINE CLINIC | Age: 55
End: 2020-07-20
Payer: COMMERCIAL

## 2020-07-20 ENCOUNTER — HOSPITAL ENCOUNTER (OUTPATIENT)
Age: 55
Discharge: HOME | End: 2020-07-22
Payer: COMMERCIAL

## 2020-07-20 VITALS
HEART RATE: 72 BPM | SYSTOLIC BLOOD PRESSURE: 158 MMHG | OXYGEN SATURATION: 98 % | DIASTOLIC BLOOD PRESSURE: 100 MMHG | BODY MASS INDEX: 39.13 KG/M2 | TEMPERATURE: 97.9 F | WEIGHT: 243.5 LBS | HEIGHT: 66 IN

## 2020-07-20 PROBLEM — F41.1 GENERALIZED ANXIETY DISORDER: Status: ACTIVE | Noted: 2020-07-20

## 2020-07-20 PROBLEM — E66.09 CLASS 2 OBESITY DUE TO EXCESS CALORIES WITHOUT SERIOUS COMORBIDITY WITH BODY MASS INDEX (BMI) OF 39.0 TO 39.9 IN ADULT: Status: ACTIVE | Noted: 2020-07-20

## 2020-07-20 LAB
ALBUMIN SERPL-MCNC: 4.3 G/DL (ref 3.5–5.2)
ALP BLD-CCNC: 64 U/L (ref 35–104)
ALT SERPL-CCNC: 25 U/L (ref 0–32)
ANION GAP SERPL CALCULATED.3IONS-SCNC: 13 MMOL/L (ref 7–16)
AST SERPL-CCNC: 21 U/L (ref 0–31)
BACTERIA: ABNORMAL /HPF
BASOPHILS ABSOLUTE: 0.02 E9/L (ref 0–0.2)
BASOPHILS RELATIVE PERCENT: 0.4 % (ref 0–2)
BILIRUB SERPL-MCNC: 0.5 MG/DL (ref 0–1.2)
BILIRUBIN URINE: NEGATIVE
BLOOD, URINE: NEGATIVE
BUN BLDV-MCNC: 19 MG/DL (ref 6–20)
CALCIUM SERPL-MCNC: 9.2 MG/DL (ref 8.6–10.2)
CHLORIDE BLD-SCNC: 104 MMOL/L (ref 98–107)
CHOLESTEROL, FASTING: 130 MG/DL (ref 0–199)
CLARITY: ABNORMAL
CO2: 26 MMOL/L (ref 22–29)
COLOR: YELLOW
CREAT SERPL-MCNC: 0.6 MG/DL (ref 0.5–1)
EOSINOPHILS ABSOLUTE: 0.14 E9/L (ref 0.05–0.5)
EOSINOPHILS RELATIVE PERCENT: 2.6 % (ref 0–6)
GFR AFRICAN AMERICAN: >60
GFR NON-AFRICAN AMERICAN: >60 ML/MIN/1.73
GLUCOSE BLD-MCNC: 116 MG/DL (ref 74–99)
GLUCOSE URINE: NEGATIVE MG/DL
HBA1C MFR BLD: 6.5 % (ref 4–5.6)
HCT VFR BLD CALC: 42.8 % (ref 34–48)
HDLC SERPL-MCNC: 42 MG/DL
HEMOGLOBIN: 13.8 G/DL (ref 11.5–15.5)
IMMATURE GRANULOCYTES #: 0.02 E9/L
IMMATURE GRANULOCYTES %: 0.4 % (ref 0–5)
KETONES, URINE: NEGATIVE MG/DL
LDL CHOLESTEROL CALCULATED: 65 MG/DL (ref 0–99)
LEUKOCYTE ESTERASE, URINE: NEGATIVE
LYMPHOCYTES ABSOLUTE: 1.49 E9/L (ref 1.5–4)
LYMPHOCYTES RELATIVE PERCENT: 28 % (ref 20–42)
MAGNESIUM: 2.1 MG/DL (ref 1.6–2.6)
MCH RBC QN AUTO: 29.2 PG (ref 26–35)
MCHC RBC AUTO-ENTMCNC: 32.2 % (ref 32–34.5)
MCV RBC AUTO: 90.5 FL (ref 80–99.9)
MICROALBUMIN UR-MCNC: <12 MG/L
MONOCYTES ABSOLUTE: 0.3 E9/L (ref 0.1–0.95)
MONOCYTES RELATIVE PERCENT: 5.6 % (ref 2–12)
NEUTROPHILS ABSOLUTE: 3.36 E9/L (ref 1.8–7.3)
NEUTROPHILS RELATIVE PERCENT: 63 % (ref 43–80)
NITRITE, URINE: NEGATIVE
PDW BLD-RTO: 12.7 FL (ref 11.5–15)
PH UA: 6 (ref 5–9)
PLATELET # BLD: 166 E9/L (ref 130–450)
PMV BLD AUTO: 10.9 FL (ref 7–12)
POTASSIUM SERPL-SCNC: 4.3 MMOL/L (ref 3.5–5)
PROTEIN UA: NEGATIVE MG/DL
RBC # BLD: 4.73 E12/L (ref 3.5–5.5)
RBC UA: ABNORMAL /HPF (ref 0–2)
SODIUM BLD-SCNC: 143 MMOL/L (ref 132–146)
SPECIFIC GRAVITY UA: >=1.03 (ref 1–1.03)
TOTAL PROTEIN: 6.6 G/DL (ref 6.4–8.3)
TRIGLYCERIDE, FASTING: 115 MG/DL (ref 0–149)
UROBILINOGEN, URINE: 0.2 E.U./DL
VITAMIN D 25-HYDROXY: 17 NG/ML (ref 30–100)
VLDLC SERPL CALC-MCNC: 23 MG/DL
WBC # BLD: 5.3 E9/L (ref 4.5–11.5)
WBC UA: ABNORMAL /HPF (ref 0–5)

## 2020-07-20 PROCEDURE — 99204 OFFICE O/P NEW MOD 45 MIN: CPT | Performed by: INTERNAL MEDICINE

## 2020-07-20 PROCEDURE — G8417 CALC BMI ABV UP PARAM F/U: HCPCS | Performed by: INTERNAL MEDICINE

## 2020-07-20 PROCEDURE — 3017F COLORECTAL CA SCREEN DOC REV: CPT | Performed by: INTERNAL MEDICINE

## 2020-07-20 PROCEDURE — 85025 COMPLETE CBC W/AUTO DIFF WBC: CPT

## 2020-07-20 PROCEDURE — G0444 DEPRESSION SCREEN ANNUAL: HCPCS | Performed by: INTERNAL MEDICINE

## 2020-07-20 PROCEDURE — 36415 COLL VENOUS BLD VENIPUNCTURE: CPT

## 2020-07-20 PROCEDURE — 80053 COMPREHEN METABOLIC PANEL: CPT

## 2020-07-20 PROCEDURE — 82306 VITAMIN D 25 HYDROXY: CPT

## 2020-07-20 PROCEDURE — 3046F HEMOGLOBIN A1C LEVEL >9.0%: CPT | Performed by: INTERNAL MEDICINE

## 2020-07-20 PROCEDURE — 80061 LIPID PANEL: CPT

## 2020-07-20 PROCEDURE — 81001 URINALYSIS AUTO W/SCOPE: CPT

## 2020-07-20 PROCEDURE — 2022F DILAT RTA XM EVC RTNOPTHY: CPT | Performed by: INTERNAL MEDICINE

## 2020-07-20 PROCEDURE — 83735 ASSAY OF MAGNESIUM: CPT

## 2020-07-20 PROCEDURE — 1036F TOBACCO NON-USER: CPT | Performed by: INTERNAL MEDICINE

## 2020-07-20 PROCEDURE — 82044 UR ALBUMIN SEMIQUANTITATIVE: CPT

## 2020-07-20 PROCEDURE — G8427 DOCREV CUR MEDS BY ELIG CLIN: HCPCS | Performed by: INTERNAL MEDICINE

## 2020-07-20 PROCEDURE — 83036 HEMOGLOBIN GLYCOSYLATED A1C: CPT

## 2020-07-20 RX ORDER — HYDROXYZINE PAMOATE 25 MG/1
25 CAPSULE ORAL 3 TIMES DAILY PRN
COMMUNITY

## 2020-07-20 RX ORDER — CYCLOBENZAPRINE HCL 10 MG
10 TABLET ORAL 3 TIMES DAILY PRN
COMMUNITY
End: 2020-07-20 | Stop reason: SDUPTHER

## 2020-07-20 RX ORDER — CYCLOBENZAPRINE HCL 10 MG
10 TABLET ORAL 3 TIMES DAILY PRN
Qty: 60 TABLET | Refills: 2 | Status: SHIPPED
Start: 2020-07-20 | End: 2021-12-29 | Stop reason: SDUPTHER

## 2020-07-20 RX ORDER — LORAZEPAM 1 MG/1
TABLET ORAL
Qty: 30 TABLET | Refills: 0 | Status: SHIPPED | OUTPATIENT
Start: 2020-07-20 | End: 2020-09-04

## 2020-07-20 RX ORDER — HYDROXYZINE PAMOATE 25 MG/1
25 CAPSULE ORAL 3 TIMES DAILY PRN
Qty: 90 CAPSULE | Refills: 2 | Status: CANCELLED | OUTPATIENT
Start: 2020-07-20

## 2020-07-20 RX ORDER — LORAZEPAM 1 MG/1
1 TABLET ORAL EVERY 8 HOURS PRN
COMMUNITY
End: 2020-07-20 | Stop reason: SDUPTHER

## 2020-07-20 ASSESSMENT — PATIENT HEALTH QUESTIONNAIRE - PHQ9
SUM OF ALL RESPONSES TO PHQ QUESTIONS 1-9: 12
6. FEELING BAD ABOUT YOURSELF - OR THAT YOU ARE A FAILURE OR HAVE LET YOURSELF OR YOUR FAMILY DOWN: 2
3. TROUBLE FALLING OR STAYING ASLEEP: 1
7. TROUBLE CONCENTRATING ON THINGS, SUCH AS READING THE NEWSPAPER OR WATCHING TELEVISION: 1
1. LITTLE INTEREST OR PLEASURE IN DOING THINGS: 1
9. THOUGHTS THAT YOU WOULD BE BETTER OFF DEAD, OR OF HURTING YOURSELF: 0
5. POOR APPETITE OR OVEREATING: 1
SUM OF ALL RESPONSES TO PHQ QUESTIONS 1-9: 12
10. IF YOU CHECKED OFF ANY PROBLEMS, HOW DIFFICULT HAVE THESE PROBLEMS MADE IT FOR YOU TO DO YOUR WORK, TAKE CARE OF THINGS AT HOME, OR GET ALONG WITH OTHER PEOPLE: 1
8. MOVING OR SPEAKING SO SLOWLY THAT OTHER PEOPLE COULD HAVE NOTICED. OR THE OPPOSITE, BEING SO FIGETY OR RESTLESS THAT YOU HAVE BEEN MOVING AROUND A LOT MORE THAN USUAL: 2
SUM OF ALL RESPONSES TO PHQ9 QUESTIONS 1 & 2: 3
4. FEELING TIRED OR HAVING LITTLE ENERGY: 2
2. FEELING DOWN, DEPRESSED OR HOPELESS: 2

## 2020-07-20 ASSESSMENT — ENCOUNTER SYMPTOMS
RHINORRHEA: 0
SORE THROAT: 0
DIARRHEA: 0
CHEST TIGHTNESS: 0
CONSTIPATION: 0
VOMITING: 0
ABDOMINAL PAIN: 0
SHORTNESS OF BREATH: 0
EYE PAIN: 0
BLOOD IN STOOL: 0
COUGH: 0
NAUSEA: 0

## 2020-07-20 NOTE — PROGRESS NOTES
[facility]) Physicians   Internal Medicine     2020  Gretel Nunn : 1965 Sex: female  Age:53 y.o. Chief Complaint   Patient presents with   [facility] Care    Anxiety     Was seen on [facility] and placed on Hydroxyzine 25mg 1 po tid but was still having anxiety and was told to go to the ER. The ER gave a sort term Rx of Ativan 1mg q 8 hrs prn.  Diabetes    Sleep Apnea        HPI:   Patient presents to office for review and management of chronic medical conditions.    -  has been to ER twice since last doctors visit. -  has been having issues with anxiety.  has been trying to talk through it, walks around when has an episode.  feels overwhelmed.  came to urgent care.  was placed on hydroxyzine.  helped for short time. Symptoms came back and went to ER.  in ER was given ativan.  has been doing better since on both hydroxyzine and lorazepam. No suicidal thoughts.     -  has diabetes.  has been off medication for last 6 months.  was on metformin.  does not remember last A1c     -  was not known to have high blood pressure.  was not placed on medications.     -  has MICKI. States not currently wearing mask. Needs new sleep study.    -  has GERD symptoms.  trying to watch diet. On tums otc      - has obesity. Health Maintenance   - immunizations:   Influenza Vaccination - declines   Pneumonia Vaccination  Zoster/Shingles Vaccine  Tetanus Vaccination    - Screenings:   Bone Density Scan   Pelvic/Pap Exam  Mammogram -      Colonoscopy     ROS:  Review of Systems   Constitutional: Negative for appetite change, chills, fever and unexpected weight change. HENT: Negative for congestion, rhinorrhea and sore throat. Eyes: Negative for pain and visual disturbance. Respiratory: Negative for cough, chest tightness and shortness of breath.     Cardiovascular: Negative for chest pain and palpitations. Gastrointestinal: Negative for abdominal pain, blood in stool, constipation, diarrhea, nausea and vomiting. Genitourinary: Negative for difficulty urinating, dysuria, frequency, pelvic pain, urgency and vaginal bleeding. Musculoskeletal: Negative for arthralgias and myalgias. Skin: Negative for rash. Neurological: Negative for dizziness, syncope, weakness, light-headedness, numbness and headaches. Hematological: Negative for adenopathy. Psychiatric/Behavioral: Negative for suicidal ideas. The patient is nervous/anxious.           Current Outpatient Medications:     hydrOXYzine (VISTARIL) 25 MG capsule, Take 25 mg by mouth 3 times daily as needed for Itching, Disp: , Rfl:     metFORMIN (GLUCOPHAGE) 500 MG tablet, Take 1 tablet by mouth 2 times daily (with meals), Disp: 180 tablet, Rfl: 1    LORazepam (ATIVAN) 1 MG tablet, 1/2 - 1 tab q 8 hrs prn, Disp: 30 tablet, Rfl: 0    cyclobenzaprine (FLEXERIL) 10 MG tablet, Take 1 tablet by mouth 3 times daily as needed for Muscle spasms, Disp: 60 tablet, Rfl: 2    sertraline (ZOLOFT) 50 MG tablet, Take 1 tablet by mouth daily, Disp: 30 tablet, Rfl: 5    Allergies   Allergen Reactions    Albuterol        Past Medical History:   Diagnosis Date    Diabetes mellitus ([facility] [address]     Essential hypertension     Essential tremor     Generalized anxiety disorder 2020    Sleep apnea        Past Surgical History:   Procedure Laterality Date     SECTION      FEMUR SURGERY Right        Family History   Problem Relation Age of Onset    Hypothyroidism Mother     Hypotension Mother     Cancer Father         gallbladder  52yr    Hypotension Brother     Heart Attack Other         Uncle on father's side  age 44       Social History     Socioeconomic History    Marital status:      Spouse name: None    Number of children: None    Years of education: None    Highest education level: None   Occupational History    None Social Needs    Financial resource strain: None    Food insecurity     Worry: None     Inability: None    Transportation needs     Medical: None     Non-medical: None   Tobacco Use    Smoking status: Former Smoker     Packs/day: 0.75     Years: 20.00     Pack years: 15.00     Types: Cigarettes     Start date: 1998     Last attempt to quit: 3/4/2018     Years since quittin.3    Smokeless tobacco: Never Used   Substance and Sexual Activity    Alcohol use: Yes     Frequency: 2-4 times a month    Drug use: Never    Sexual activity: None   Lifestyle    Physical activity     Days per week: None     Minutes per session: None    Stress: None   Relationships    Social connections     Talks on phone: None     Gets together: None     Attends [employer] service: None     Active member of club or organization: None     Attends meetings of clubs or organizations: None     Relationship status: None    Intimate partner violence     Fear of current or ex partner: None     Emotionally abused: None     Physically abused: None     Forced sexual activity: None   Other Topics Concern    None   Social History Narrative    None        Vitals:    20 1048 20 1211   BP: (!) 160/100 (!) 158/100   Site: Right Upper Arm    Position: Sitting    Cuff Size: Large Adult    Pulse: 72    Temp: 97.9 °F (36.6 °C)    TempSrc: Temporal    SpO2: 98%    Weight: 243 lb 8 oz (110.5 kg)    Height: 5' 6\" (1.676 m)        Exam:  Physical Exam  Vitals signs reviewed. Constitutional:       Appearance: She is well-developed. HENT:      Head: Normocephalic and atraumatic. Right Ear: External ear normal.      Left Ear: External ear normal.   Eyes:      Pupils: Pupils are equal, round, and reactive to light. Neck:      Musculoskeletal: Normal range of motion and neck supple. Thyroid: No thyromegaly. Cardiovascular:      Rate and Rhythm: Normal rate and regular rhythm. Heart sounds: Normal heart sounds.  No Status:   Future     Standing Expiration Date:   9/20/2021     Scheduling Instructions:      PER [facility] PROTOCOL     Order Specific Question:   Reason for exam:     Answer:   screen breast cancer    Comprehensive Metabolic Panel     Standing Status:   Future     Number of Occurrences:   1     Standing Expiration Date:   7/20/2021    Magnesium     Standing Status:   Future     Number of Occurrences:   1     Standing Expiration Date:   7/20/2021    Lipid, Fasting     Standing Status:   Future     Number of Occurrences:   1     Standing Expiration Date:   7/20/2021    Vitamin D 25 Hydroxy     Standing Status:   Future     Number of Occurrences:   1     Standing Expiration Date:   7/20/2021    Urinalysis     Standing Status:   Future     Number of Occurrences:   1     Standing Expiration Date:   7/20/2021    CBC Auto Differential     Standing Status:   Future     Number of Occurrences:   1     Standing Expiration Date:   7/20/2021    Hemoglobin A1C     Standing Status:   Future     Number of Occurrences:   1     Standing Expiration Date:   7/20/2021    Microalbumin, Ur     Standing Status:   Future     Number of Occurrences:   1     Standing Expiration Date:   7/20/2021   [address]  Samson Burgos MD, General Surgery, [facility]     Referral Priority:   Routine     Referral Type:   Eval and Treat     Referral Reason:   Specialty Services Required     Referred to Provider:   Kale Mayfield MD     Requested Specialty:   General Surgery     Number of Visits Requested:   1    External Referral To Psychiatry     Referral Priority:   Routine     Referral Type:   Eval and Treat     Referral Reason:   Specialty Services Required     Referred to Provider:   Matt Chirinos     Requested Specialty:   Psychiatry     Number of Visits Requested:   1     Requested Prescriptions     Signed Prescriptions Disp Refills    metFORMIN (GLUCOPHAGE) 500 MG tablet 180 tablet 1     Sig: Take 1 tablet by mouth 2 times daily (with meals)  LORazepam (ATIVAN) 1 MG tablet 30 tablet 0     Si/2 - 1 tab q 8 hrs prn    cyclobenzaprine (FLEXERIL) 10 MG tablet 60 tablet 2     Sig: Take 1 tablet by mouth 3 times daily as needed for Muscle spasms    sertraline (ZOLOFT) 50 MG tablet 30 tablet 5     Sig: Take 1 tablet by mouth daily        Jo Ann Fish MD  2020  1:08 PM

## 2020-07-21 ENCOUNTER — TELEPHONE (OUTPATIENT)
Dept: FAMILY MEDICINE CLINIC | Age: 55
End: 2020-07-21

## 2020-07-21 NOTE — TELEPHONE ENCOUNTER
I tried calling pt again but she did not answer. I was not able to leave a VM. If pt calls back please also go over her lab results. If we cannot [facility] she could be seen at [address] [facility] and [address] [phone].

## 2020-07-21 NOTE — TELEPHONE ENCOUNTER
I would call the [facility] where Dr. Minerva Mccall is and see if they can get her in sooner, there is no other medication that I have that will work instantly

## 2020-07-21 NOTE — TELEPHONE ENCOUNTER
Called the home number but VM is not set up. I was not able to leave a message. Pt also has another message in her chart about increased anxiety.

## 2020-07-22 NOTE — TELEPHONE ENCOUNTER
patient notified. She will call [facility] office now.  If they cannot see her soon she will call back for other referral option in [place]

## 2020-07-23 ENCOUNTER — TELEPHONE (OUTPATIENT)
Dept: FAMILY MEDICINE CLINIC | Age: 55
End: 2020-07-23

## 2020-07-23 NOTE — LETTER
[address] [place] [address] Drive  [address] [place] [address]  Phone: [phone]  Fax: [phone]    Cristobal Paniagua MD        July 23, 2020     Patient: Amber Altamirano   YOB: 1965   Date of Visit: 7/23/2020       To Whom It May Concern: It is my medical opinion that Amber Altamirano, needed to be off work on July 23 2020. She may return to work on July 24 2020 without restriction. If you have any questions or concerns, please don't hesitate to call.     Sincerely,        Cristobal Paniagua MD

## 2020-07-23 NOTE — TELEPHONE ENCOUNTER
Rina calling to report that she has an appointment to see Jenna Scott at the counseling center on Aug 4th. However, she had a 1 hour telephone consultation with a counselor today because she was so upset. This did help some. She is still upset, anxious and crying. She is asking if she can have a note for work excusing her for the day?

## 2020-07-24 RX ORDER — ERGOCALCIFEROL 1.25 MG/1
50000 CAPSULE ORAL WEEKLY
Qty: 12 CAPSULE | Refills: 1 | Status: SHIPPED
Start: 2020-07-24 | End: 2020-12-10 | Stop reason: SDUPTHER

## 2020-07-24 NOTE — TELEPHONE ENCOUNTER
Patient notified. Faxing new work letter. She said you were going to call in Vitamin D for her and it's not at the pharmacy.

## 2020-07-30 ENCOUNTER — TELEPHONE (OUTPATIENT)
Dept: FAMILY MEDICINE CLINIC | Age: 55
End: 2020-07-30

## 2020-07-30 NOTE — LETTER
[address] [place] [address]  Phone: [phone]  Fax: [phone]    Jo Ann Fish MD        August 5, 2020     [address] [facility] [address]      Dear Jennie BAILEY:    Below are the results from your recent visit:    Please let the patient know that mammogram per radiology report was normal.  No suspicious masses lesions or other calcifications. Recommend to repeat in 1 year or sooner if changes.      Please send a copy of the report to the patient     Thanks  If you have any questions or concerns, please don't hesitate to call.     Sincerely,        Jo Ann Fish MD

## 2020-07-30 NOTE — TELEPHONE ENCOUNTER
Please let the patient know that mammogram per radiology report was normal.  No suspicious masses lesions or other calcifications. Recommend to repeat in 1 year or sooner if changes.      Please send a copy of the report to the patient    Thanks

## 2020-09-04 ENCOUNTER — OFFICE VISIT (OUTPATIENT)
Dept: PRIMARY CARE CLINIC | Age: 55
End: 2020-09-04
Payer: COMMERCIAL

## 2020-09-04 VITALS
OXYGEN SATURATION: 97 % | HEART RATE: 69 BPM | BODY MASS INDEX: 35.29 KG/M2 | DIASTOLIC BLOOD PRESSURE: 84 MMHG | RESPIRATION RATE: 16 BRPM | TEMPERATURE: 97.4 F | SYSTOLIC BLOOD PRESSURE: 140 MMHG | HEIGHT: 66 IN | WEIGHT: 219.6 LBS

## 2020-09-04 PROBLEM — E55.9 VITAMIN D DEFICIENCY: Status: ACTIVE | Noted: 2020-09-04

## 2020-09-04 PROCEDURE — 2022F DILAT RTA XM EVC RTNOPTHY: CPT | Performed by: INTERNAL MEDICINE

## 2020-09-04 PROCEDURE — 3044F HG A1C LEVEL LT 7.0%: CPT | Performed by: INTERNAL MEDICINE

## 2020-09-04 PROCEDURE — 3017F COLORECTAL CA SCREEN DOC REV: CPT | Performed by: INTERNAL MEDICINE

## 2020-09-04 PROCEDURE — 99214 OFFICE O/P EST MOD 30 MIN: CPT | Performed by: INTERNAL MEDICINE

## 2020-09-04 PROCEDURE — 1036F TOBACCO NON-USER: CPT | Performed by: INTERNAL MEDICINE

## 2020-09-04 PROCEDURE — G8417 CALC BMI ABV UP PARAM F/U: HCPCS | Performed by: INTERNAL MEDICINE

## 2020-09-04 PROCEDURE — G8427 DOCREV CUR MEDS BY ELIG CLIN: HCPCS | Performed by: INTERNAL MEDICINE

## 2020-09-04 RX ORDER — HYDROXYZINE HYDROCHLORIDE 25 MG/1
25 TABLET, FILM COATED ORAL EVERY 6 HOURS PRN
COMMUNITY
Start: 2020-09-01 | End: 2020-12-10 | Stop reason: SDUPTHER

## 2020-09-04 RX ORDER — SERTRALINE HYDROCHLORIDE 100 MG/1
150 TABLET, FILM COATED ORAL DAILY
COMMUNITY
Start: 2020-09-01

## 2020-09-04 RX ORDER — LISINOPRIL 10 MG/1
10 TABLET ORAL DAILY
Qty: 30 TABLET | Refills: 5 | Status: SHIPPED
Start: 2020-09-04 | End: 2020-12-10 | Stop reason: SINTOL

## 2020-09-04 ASSESSMENT — ENCOUNTER SYMPTOMS
CHEST TIGHTNESS: 0
VOMITING: 0
CONSTIPATION: 0
BLOOD IN STOOL: 0
COUGH: 0
NAUSEA: 0
ABDOMINAL PAIN: 0
DIARRHEA: 0
RHINORRHEA: 0
SHORTNESS OF BREATH: 0
SORE THROAT: 0
EYE PAIN: 0

## 2020-09-25 ENCOUNTER — OFFICE VISIT (OUTPATIENT)
Dept: SURGERY | Age: 55
End: 2020-09-25

## 2020-09-25 VITALS
WEIGHT: 214 LBS | TEMPERATURE: 96.8 F | HEART RATE: 70 BPM | HEIGHT: 66 IN | BODY MASS INDEX: 34.39 KG/M2 | SYSTOLIC BLOOD PRESSURE: 151 MMHG | OXYGEN SATURATION: 95 % | DIASTOLIC BLOOD PRESSURE: 86 MMHG | RESPIRATION RATE: 18 BRPM

## 2020-09-25 PROCEDURE — 99999 PR OFFICE/OUTPT VISIT,PROCEDURE ONLY: CPT | Performed by: SURGERY

## 2020-09-25 NOTE — PROGRESS NOTES
[address] [facility] Note    Assessment/Plan:      Diagnosis Orders   1. Encounter for screening colonoscopy      Will schedule colonoscopy. Return for Colonoscopy. Chief Complaint   Patient presents with    New Patient     ref by Dr. Rajni Morrison for a colonoscopy. gynocologist found some rectal bleeding when she had an exam. been 10 years since her last one. PCP: Barbara Dennison MD    HPI: Jorge A Celis is a 50 y.o. female who presents in consultation for colonoscopy. Her last one was 10 years ago and was for bleeding issues. She has had no recent bleeding. She does have some loose stools that she reports is from her medications. She denies any abdominal pain. There is a family history of colon cancer in her maternal and paternal uncles. Past Medical History:   Diagnosis Date    Diabetes mellitus ([facility] [address]     Essential hypertension     Essential tremor     Generalized anxiety disorder 2020    Sleep apnea        Past Surgical History:   Procedure Laterality Date     SECTION      COLONOSCOPY      10 years ago    FEMUR SURGERY Right        Prior to Admission medications    Medication Sig Start Date End Date Taking?  Authorizing Provider   sertraline (ZOLOFT) 100 MG tablet  20  Yes Historical Provider, MD   lisinopril (PRINIVIL;ZESTRIL) 10 MG tablet Take 1 tablet by mouth daily 20  Yes Barbara Dennison MD   vitamin D (ERGOCALCIFEROL) 1.25 MG (53709 UT) CAPS capsule Take 1 capsule by mouth once a week 20  Yes Barbara Dennison MD   hydrOXYzine (VISTARIL) 25 MG capsule Take 25 mg by mouth 3 times daily as needed for Itching   Yes Historical Provider, MD   metFORMIN (GLUCOPHAGE) 500 MG tablet Take 1 tablet by mouth 2 times daily (with meals) 20  Yes Barbara Dennison MD   cyclobenzaprine (FLEXERIL) 10 MG tablet Take 1 tablet by mouth 3 times daily as needed for Muscle spasms 20  Yes Barbara Dennison MD   hydrOXYzine (ATARAX) 25 MG tablet  20 Historical Provider, MD       Allergies   Allergen Reactions    Albuterol        Social History     Socioeconomic History    Marital status:      Spouse name: None    Number of children: None    Years of education: None    Highest education level: None   Occupational History    None   Social Needs    Financial resource strain: None    Food insecurity     Worry: None     Inability: None    Transportation needs     Medical: None     Non-medical: None   Tobacco Use    Smoking status: Former Smoker     Packs/day: 0.75     Years: 20.00     Pack years: 15.00     Types: Cigarettes     Start date: 1998     Last attempt to quit: 3/4/2018     Years since quittin.5    Smokeless tobacco: Never Used   Substance and Sexual Activity    Alcohol use: Yes     Frequency: 2-4 times a month    Drug use: Never    Sexual activity: None   Lifestyle    Physical activity     Days per week: None     Minutes per session: None    Stress: None   Relationships    Social connections     Talks on phone: None     Gets together: None     Attends Zoroastrianism service: None     Active member of club or organization: None     Attends meetings of clubs or organizations: None     Relationship status: None    Intimate partner violence     Fear of current or ex partner: None     Emotionally abused: None     Physically abused: None     Forced sexual activity: None   Other Topics Concern    None   Social History Narrative    None       Family History   Problem Relation Age of Onset    Hypothyroidism Mother     Hypotension Mother     Cancer Father         gallbladder  52yr    Hypotension Brother     Heart Attack Other         Uncle on father's side  age 44       Review of Systems   All other systems reviewed and are negative.               Objective:  Vitals:    20 1040   BP: (!) 151/86   Site: Right Upper Arm   Position: Sitting   Cuff Size: Medium Adult   Pulse: 70   Resp: 18   Temp: 96.8 °F (36 °C)   TempSrc: Temporal   SpO2: 95%   Weight: 214 lb (97.1 kg)   Height: 5' 6\" (1.676 m)          Physical Exam  HENT:      Head: Normocephalic and atraumatic. Eyes:      General:         Right eye: No discharge. Left eye: No discharge. Neck:      Trachea: No tracheal deviation. Cardiovascular:      Rate and Rhythm: Normal rate. Pulmonary:      Effort: Pulmonary effort is normal. No respiratory distress. Abdominal:      General: There is no distension. Palpations: Abdomen is soft. Tenderness: There is no abdominal tenderness. There is no guarding or rebound. Skin:     General: Skin is warm and dry. Neurological:      Mental Status: She is alert and oriented to person, place, and time. Amber Berry MD  9/29/2020    NOTE: This report, in part or full,may have been transcribed using voice recognition software. Every effort was made to ensure accuracy; however, inadvertent computerized transcription errors may be present. Please excuse any transcriptional grammatical or spelling errors that may have escaped my editorial review.     CC: Ton Orellana MD

## 2020-09-29 ENCOUNTER — TELEPHONE (OUTPATIENT)
Dept: SURGERY | Age: 55
End: 2020-09-29

## 2020-09-29 NOTE — TELEPHONE ENCOUNTER
Caryn Tyler is scheduled for colonoscopy with Dr Sam Banks on 11-30-20 at [facility] at 10:30 am. Patient was told to arrive at 9:30 am. Patient needs to be NPO after midnight the night before procedure. All surgery instructions were explained to the patient and a surgery letter was also mailed out. MA informed patient that PAT will also be calling to review pre-op instructions and medications. Patient verbalized understanding.   Electronically signed by Karly Stanley MA on 9/29/2020 at 9:43 AM

## 2020-09-29 NOTE — TELEPHONE ENCOUNTER
Prior Authorization Form:      DEMOGRAPHICS:                     Patient Name:  Louise Coffey  Patient :  1965            Insurance:  Payor: [employer] / Plan: [employer] [address] / Product Type: *No Product type* /   Insurance ID Number:    Payor/Plan Subscr  Sex Relation Sub. Ins. ID Effective Group Num   1.  [address] [facility]* 1965 Female  [id number]                                   [address]         DIAGNOSIS & PROCEDURE:                       Procedure/Operation: Colonoscopy           CPT Code: 23934    Diagnosis:  Screening    ICD10 Code: Z12.11    Location:  [facility]    Surgeon:  Dr Coral Espinoza INFORMATION:                          Date: 20    Time: 10:30 am              Anesthesia:  [facility]                                                       Status:  Outpatient        Special Comments:         Electronically signed by Maico Campuzano MA on 2020 at 9:45 AM

## 2020-11-04 ENCOUNTER — TELEPHONE (OUTPATIENT)
Dept: SURGERY | Age: 55
End: 2020-11-04

## 2020-12-07 DIAGNOSIS — I10 ESSENTIAL HYPERTENSION: ICD-10-CM

## 2020-12-07 DIAGNOSIS — E55.9 VITAMIN D DEFICIENCY: ICD-10-CM

## 2020-12-07 DIAGNOSIS — Z82.49 FAMILY HX OF ISCHEM HEART DIS AND OTH DIS OF THE CIRC SYS: ICD-10-CM

## 2020-12-07 DIAGNOSIS — E11.65 TYPE 2 DIABETES MELLITUS WITH HYPERGLYCEMIA, WITHOUT LONG-TERM CURRENT USE OF INSULIN (HCC): ICD-10-CM

## 2020-12-07 LAB
ALBUMIN SERPL-MCNC: 4.3 G/DL (ref 3.5–5.2)
ALP BLD-CCNC: 60 U/L (ref 35–104)
ALT SERPL-CCNC: 12 U/L (ref 0–32)
ANION GAP SERPL CALCULATED.3IONS-SCNC: 9 MMOL/L (ref 7–16)
AST SERPL-CCNC: 13 U/L (ref 0–31)
BASOPHILS ABSOLUTE: 0.03 E9/L (ref 0–0.2)
BASOPHILS RELATIVE PERCENT: 0.7 % (ref 0–2)
BILIRUB SERPL-MCNC: 0.5 MG/DL (ref 0–1.2)
BILIRUBIN URINE: NEGATIVE
BLOOD, URINE: NEGATIVE
BUN BLDV-MCNC: 14 MG/DL (ref 6–20)
CALCIUM SERPL-MCNC: 9 MG/DL (ref 8.6–10.2)
CHLORIDE BLD-SCNC: 104 MMOL/L (ref 98–107)
CHOLESTEROL, FASTING: 178 MG/DL (ref 0–199)
CLARITY: CLEAR
CO2: 26 MMOL/L (ref 22–29)
COLOR: YELLOW
CREAT SERPL-MCNC: 0.9 MG/DL (ref 0.5–1)
EOSINOPHILS ABSOLUTE: 0.07 E9/L (ref 0.05–0.5)
EOSINOPHILS RELATIVE PERCENT: 1.6 % (ref 0–6)
GFR AFRICAN AMERICAN: >60
GFR NON-AFRICAN AMERICAN: >60 ML/MIN/1.73
GLUCOSE BLD-MCNC: 110 MG/DL (ref 74–99)
GLUCOSE URINE: NEGATIVE MG/DL
HBA1C MFR BLD: 5.7 % (ref 4–5.6)
HCT VFR BLD CALC: 39.7 % (ref 34–48)
HDLC SERPL-MCNC: 58 MG/DL
HEMOGLOBIN: 12.9 G/DL (ref 11.5–15.5)
IMMATURE GRANULOCYTES #: 0.02 E9/L
IMMATURE GRANULOCYTES %: 0.4 % (ref 0–5)
KETONES, URINE: NEGATIVE MG/DL
LDL CHOLESTEROL CALCULATED: 100 MG/DL (ref 0–99)
LEUKOCYTE ESTERASE, URINE: NEGATIVE
LYMPHOCYTES ABSOLUTE: 1.29 E9/L (ref 1.5–4)
LYMPHOCYTES RELATIVE PERCENT: 29 % (ref 20–42)
MAGNESIUM: 2.1 MG/DL (ref 1.6–2.6)
MCH RBC QN AUTO: 28.7 PG (ref 26–35)
MCHC RBC AUTO-ENTMCNC: 32.5 % (ref 32–34.5)
MCV RBC AUTO: 88.2 FL (ref 80–99.9)
MICROALBUMIN UR-MCNC: <12 MG/L
MONOCYTES ABSOLUTE: 0.27 E9/L (ref 0.1–0.95)
MONOCYTES RELATIVE PERCENT: 6.1 % (ref 2–12)
NEUTROPHILS ABSOLUTE: 2.77 E9/L (ref 1.8–7.3)
NEUTROPHILS RELATIVE PERCENT: 62.2 % (ref 43–80)
NITRITE, URINE: NEGATIVE
PDW BLD-RTO: 13.3 FL (ref 11.5–15)
PH UA: 5.5 (ref 5–9)
PLATELET # BLD: 144 E9/L (ref 130–450)
PMV BLD AUTO: 10.6 FL (ref 7–12)
POTASSIUM SERPL-SCNC: 4 MMOL/L (ref 3.5–5)
PROTEIN UA: NEGATIVE MG/DL
RBC # BLD: 4.5 E12/L (ref 3.5–5.5)
SODIUM BLD-SCNC: 139 MMOL/L (ref 132–146)
SPECIFIC GRAVITY UA: >=1.03 (ref 1–1.03)
TOTAL PROTEIN: 6.6 G/DL (ref 6.4–8.3)
TRIGLYCERIDE, FASTING: 98 MG/DL (ref 0–149)
UROBILINOGEN, URINE: 0.2 E.U./DL
VITAMIN D 25-HYDROXY: 21 NG/ML (ref 30–100)
VLDLC SERPL CALC-MCNC: 20 MG/DL
WBC # BLD: 4.5 E9/L (ref 4.5–11.5)

## 2020-12-10 ENCOUNTER — OFFICE VISIT (OUTPATIENT)
Dept: FAMILY MEDICINE CLINIC | Age: 55
End: 2020-12-10
Payer: COMMERCIAL

## 2020-12-10 VITALS
HEART RATE: 68 BPM | TEMPERATURE: 97.5 F | BODY MASS INDEX: 34.63 KG/M2 | OXYGEN SATURATION: 97 % | SYSTOLIC BLOOD PRESSURE: 132 MMHG | WEIGHT: 215.5 LBS | HEIGHT: 66 IN | DIASTOLIC BLOOD PRESSURE: 80 MMHG

## 2020-12-10 PROCEDURE — 90471 IMMUNIZATION ADMIN: CPT | Performed by: INTERNAL MEDICINE

## 2020-12-10 PROCEDURE — 3044F HG A1C LEVEL LT 7.0%: CPT | Performed by: INTERNAL MEDICINE

## 2020-12-10 PROCEDURE — G8427 DOCREV CUR MEDS BY ELIG CLIN: HCPCS | Performed by: INTERNAL MEDICINE

## 2020-12-10 PROCEDURE — 2022F DILAT RTA XM EVC RTNOPTHY: CPT | Performed by: INTERNAL MEDICINE

## 2020-12-10 PROCEDURE — G8417 CALC BMI ABV UP PARAM F/U: HCPCS | Performed by: INTERNAL MEDICINE

## 2020-12-10 PROCEDURE — 3017F COLORECTAL CA SCREEN DOC REV: CPT | Performed by: INTERNAL MEDICINE

## 2020-12-10 PROCEDURE — G8482 FLU IMMUNIZE ORDER/ADMIN: HCPCS | Performed by: INTERNAL MEDICINE

## 2020-12-10 PROCEDURE — 99214 OFFICE O/P EST MOD 30 MIN: CPT | Performed by: INTERNAL MEDICINE

## 2020-12-10 PROCEDURE — 1036F TOBACCO NON-USER: CPT | Performed by: INTERNAL MEDICINE

## 2020-12-10 PROCEDURE — 90686 IIV4 VACC NO PRSV 0.5 ML IM: CPT | Performed by: INTERNAL MEDICINE

## 2020-12-10 RX ORDER — ERGOCALCIFEROL 1.25 MG/1
50000 CAPSULE ORAL WEEKLY
Qty: 12 CAPSULE | Refills: 1 | Status: SHIPPED
Start: 2020-12-10 | End: 2021-04-12 | Stop reason: SDUPTHER

## 2020-12-10 RX ORDER — LOSARTAN POTASSIUM 25 MG/1
25 TABLET ORAL DAILY
Qty: 90 TABLET | Refills: 1 | Status: SHIPPED
Start: 2020-12-10 | End: 2021-04-12 | Stop reason: SDUPTHER

## 2020-12-10 ASSESSMENT — ENCOUNTER SYMPTOMS
VOMITING: 0
SORE THROAT: 0
CHEST TIGHTNESS: 0
ABDOMINAL PAIN: 0
COUGH: 0
CONSTIPATION: 0
BLOOD IN STOOL: 0
EYE PAIN: 0
DIARRHEA: 0
RHINORRHEA: 0
SHORTNESS OF BREATH: 0
NAUSEA: 0

## 2020-12-10 NOTE — PROGRESS NOTES
rhinorrhea and sore throat. Eyes: Negative for pain and visual disturbance. Respiratory: Negative for cough, chest tightness and shortness of breath. Cardiovascular: Negative for chest pain and palpitations. Gastrointestinal: Negative for abdominal pain, blood in stool, constipation, diarrhea, nausea and vomiting. Genitourinary: Negative for difficulty urinating, dysuria, frequency, pelvic pain, urgency and vaginal bleeding. Musculoskeletal: Negative for arthralgias and myalgias. Skin: Negative for rash. Neurological: Negative for dizziness, syncope, weakness, light-headedness, numbness and headaches. Hematological: Negative for adenopathy. Psychiatric/Behavioral: Negative for suicidal ideas. The patient is nervous/anxious.           Current Outpatient Medications:     vitamin D (ERGOCALCIFEROL) 1.25 MG (83038 UT) CAPS capsule, Take 1 capsule by mouth once a week, Disp: 12 capsule, Rfl: 1    losartan (COZAAR) 25 MG tablet, Take 1 tablet by mouth daily, Disp: 90 tablet, Rfl: 1    sertraline (ZOLOFT) 100 MG tablet, Take 100 mg by mouth daily , Disp: , Rfl:     hydrOXYzine (VISTARIL) 25 MG capsule, Take 25 mg by mouth 3 times daily as needed for Itching, Disp: , Rfl:     cyclobenzaprine (FLEXERIL) 10 MG tablet, Take 1 tablet by mouth 3 times daily as needed for Muscle spasms, Disp: 60 tablet, Rfl: 2    Allergies   Allergen Reactions    Albuterol      Made her feel funny        Past Medical History:   Diagnosis Date    Diabetes mellitus ([facility] [address]     Essential hypertension     Essential tremor     Generalized anxiety disorder 2020    Sleep apnea        Past Surgical History:   Procedure Laterality Date     SECTION      COLONOSCOPY      10 years ago    FEMUR SURGERY Right        Family History   Problem Relation Age of Onset    Hypothyroidism Mother     Hypotension Mother     Cancer Father         gallbladder  52yr    Hypotension Brother     Heart Attack Other Uncle on father's side  age 44       Social History     Socioeconomic History    Marital status:      Spouse name: None    Number of children: None    Years of education: None    Highest education level: None   Occupational History    None   Social Needs    Financial resource strain: None    Food insecurity     Worry: None     Inability: None    Transportation needs     Medical: None     Non-medical: None   Tobacco Use    Smoking status: Former Smoker     Packs/day: 0.75     Years: 20.00     Pack years: 15.00     Types: Cigarettes     Start date: 1998     Last attempt to quit: 3/4/2018     Years since quittin.7    Smokeless tobacco: Never Used   Substance and Sexual Activity    Alcohol use: Yes     Frequency: 2-4 times a month    Drug use: Never    Sexual activity: None   Lifestyle    Physical activity     Days per week: None     Minutes per session: None    Stress: None   Relationships    Social connections     Talks on phone: None     Gets together: None     Attends Mandaen service: None     Active member of club or organization: None     Attends meetings of clubs or organizations: None     Relationship status: None    Intimate partner violence     Fear of current or ex partner: None     Emotionally abused: None     Physically abused: None     Forced sexual activity: None   Other Topics Concern    None   Social History Narrative    None        Vitals:    12/10/20 1559   BP: 132/80   Pulse: 68   Temp: 97.5 °F (36.4 °C)   SpO2: 97%   Weight: 215 lb 8 oz (97.8 kg)   Height: 5' 6\" (1.676 m)       Exam:  Physical Exam  Vitals signs reviewed. Constitutional:       Appearance: She is well-developed. HENT:      Head: Normocephalic and atraumatic. Right Ear: External ear normal.      Left Ear: External ear normal.   Eyes:      Pupils: Pupils are equal, round, and reactive to light. Neck:      Musculoskeletal: Normal range of motion and neck supple.       Thyroid: No thyromegaly. Cardiovascular:      Rate and Rhythm: Normal rate and regular rhythm. Heart sounds: Normal heart sounds. No murmur. Pulmonary:      Effort: Pulmonary effort is normal.      Breath sounds: Normal breath sounds. No wheezing. Abdominal:      General: Bowel sounds are normal. There is no distension. Palpations: Abdomen is soft. Tenderness: There is no abdominal tenderness. There is no guarding or rebound. Musculoskeletal: Normal range of motion. Lymphadenopathy:      Cervical: No cervical adenopathy. Skin:     General: Skin is warm and dry. Neurological:      Mental Status: She is alert and oriented to person, place, and time. Cranial Nerves: No cranial nerve deficit. Psychiatric:         Judgment: Judgment normal.         Assessment and Plan:    Rina BAILEY was seen today for establish care, anxiety, diabetes and sleep apnea.     Diagnoses and all orders for this visit:    Generalized anxiety disorder  - continue present treatment  - now following with psychiatry   - on zoloft 100mg per psychiatry   - added hydroxyzine prn   - no suicidal thoughts   - ER if worsening     Essential hypertension  - continue present treatment  - watch diet   - monitor blood pressure   - on lisinopril since DM diagnosis - states dry cough and poss alopecia   - will stop lisinopril and start losartan     Type 2 diabetes mellitus with hyperglycemia, without long-term current use of insulin (HCC)  - continue present treatment  - continue present treatment  - watch diet   - on metformin 500mg daily   - follow A1c - last was 5.7 (12/2020)   - will recommend to hold metformin   - follow off meds     Not on ACE  Not on statin   Not aspirin   Follows opth     Obstructive sleep apnea syndrome  - continue present treatment  - has not been wearing cpap   - will need new sleep study     Vitamin D deficiency  - continue present treatment   - on vit D [id number] weekly   - follow labs     Class 2 obesity due to excess calories without serious comorbidity with body mass index (BMI) of 35.0 to 35.9 in adult  - discussed diet and exercise   - has lost weight since last visit (9/2020)     Acute pain of right shoulder  - recommend exercises   - ice   - declines prednisone   - referral if not improved     Return in about 3 months (around 3/10/2021) for check up and review.     Orders Placed This Encounter   Procedures    INFLUENZA, QUADV, 3 YRS AND OLDER, IM PF, PREFILL SYR OR SDV, 0.5ML (AFLURIA QUADV, PF)     Requested Prescriptions     Signed Prescriptions Disp Refills    vitamin D (ERGOCALCIFEROL) 1.25 MG (51784 UT) CAPS capsule 12 capsule 1     Sig: Take 1 capsule by mouth once a week    losartan (COZAAR) 25 MG tablet 90 tablet 1     Sig: Take 1 tablet by mouth daily        Hazel Mena MD  12/10/2020  4:35 PM

## 2020-12-10 NOTE — PATIENT INSTRUCTIONS
Patient Education        Shoulder Arthritis: Exercises  Introduction  Here are some examples of exercises for you to try. The exercises may be suggested for a condition or for rehabilitation. Start each exercise slowly. Ease off the exercises if you start to have pain. You will be told when to start these exercises and which ones will work best for you. How to do the exercises  Shoulder flexion (lying down)   To make a wand for this exercise, use a piece of PVC pipe or a broom handle with the broom removed. Make the wand about a foot wider than your shoulders. 1. Lie on your back, holding a wand with both hands. Your palms should face down as you hold the wand. 2. Keeping your elbows straight, slowly raise your arms over your head. Raise them until you feel a stretch in your shoulders, upper back, and chest.  3. Hold for 15 to 30 seconds. 4. Repeat 2 to 4 times. Shoulder rotation (lying down)   To make a wand for this exercise, use a piece of PVC pipe or a broom handle with the broom removed. Make the wand about a foot wider than your shoulders. 1. Lie on your back. Hold a wand with both hands with your elbows bent and palms up. 2. Keep your elbows close to your body, and move the wand across your body toward the sore arm. 3. Hold for 8 to 12 seconds. 4. Repeat 2 to 4 times. Shoulder internal rotation with towel   1. Hold a towel above and behind your head with the arm that is not sore. 2. With your sore arm, reach behind your back and grasp the towel. 3. With the arm above your head, pull the towel upward. Do this until you feel a stretch on the front and outside of your sore shoulder. 4. Hold 15 to 30 seconds. 5. Repeat 2 to 4 times. Shoulder blade squeeze   1. Stand with your arms at your sides, and squeeze your shoulder blades together. Do not raise your shoulders up as you squeeze. 2. Hold 6 seconds. 3. Repeat 8 to 12 times.     Resisted rows   For this exercise, you will need elastic have pain in your back, do not do this exercise. 1. Hold on to a table or the back of a chair with your good arm. Then bend forward a little and let your sore arm hang straight down. This exercise does not use the arm muscles. Rather, use your legs and your hips to create movement that makes your arm swing freely. 2. Use the movement from your hips and legs to guide the slightly swinging arm back and forth like a pendulum (or elephant trunk). Then guide it in circles that start small (about the size of a dinner plate). Make the circles a bit larger each day, as your pain allows. 3. Do this exercise for 5 minutes, 5 to 7 times each day. 4. As you have less pain, try bending over a little farther to do this exercise. This will increase the amount of movement at your shoulder. Follow-up care is a key part of your treatment and safety. Be sure to make and go to all appointments, and call your doctor if you are having problems. It's also a good idea to know your test results and keep a list of the medicines you take. Where can you learn more? Go to https://[employer]. org and sign in to your [employer] account. Enter H562 in the thesweetlink box to learn more about \"Shoulder Arthritis: Exercises. \"     If you do not have an account, please click on the \"Sign Up Now\" link. Current as of: March 2, 2020               Content Version: 12.6  © 7126-9106 [employer]. Care instructions adapted under license by [facility] ([facility]). If you have questions about a medical condition or this instruction, always ask your healthcare professional. [place] [address] any warranty or liability for your use of this information.

## 2020-12-18 ENCOUNTER — OFFICE VISIT (OUTPATIENT)
Dept: PRIMARY CARE CLINIC | Age: 55
End: 2020-12-18
Payer: COMMERCIAL

## 2020-12-18 VITALS
DIASTOLIC BLOOD PRESSURE: 76 MMHG | TEMPERATURE: 99.7 F | SYSTOLIC BLOOD PRESSURE: 140 MMHG | BODY MASS INDEX: 34.55 KG/M2 | HEART RATE: 75 BPM | WEIGHT: 215 LBS | HEIGHT: 66 IN | OXYGEN SATURATION: 97 %

## 2020-12-18 LAB
Lab: NORMAL
QC PASS/FAIL: NORMAL
SARS-COV-2, POC: DETECTED

## 2020-12-18 PROCEDURE — G8482 FLU IMMUNIZE ORDER/ADMIN: HCPCS | Performed by: FAMILY MEDICINE

## 2020-12-18 PROCEDURE — 87426 SARSCOV CORONAVIRUS AG IA: CPT | Performed by: FAMILY MEDICINE

## 2020-12-18 PROCEDURE — 99213 OFFICE O/P EST LOW 20 MIN: CPT | Performed by: FAMILY MEDICINE

## 2020-12-18 PROCEDURE — G8417 CALC BMI ABV UP PARAM F/U: HCPCS | Performed by: FAMILY MEDICINE

## 2020-12-18 PROCEDURE — 3017F COLORECTAL CA SCREEN DOC REV: CPT | Performed by: FAMILY MEDICINE

## 2020-12-18 PROCEDURE — 1036F TOBACCO NON-USER: CPT | Performed by: FAMILY MEDICINE

## 2020-12-18 PROCEDURE — G8427 DOCREV CUR MEDS BY ELIG CLIN: HCPCS | Performed by: FAMILY MEDICINE

## 2020-12-18 NOTE — PROGRESS NOTES
12/18/2020    Chief Complaint   Patient presents with    Cough       HPI    Yajaira Cobos is a 50 y.o. patient that presents today with concerns for COVID. Mom has also been sick. Rapid test positive. Possible COVID Sx: Symptoms include: fatigue, cough and just not feeling quite right. . Onset of symptoms was 1 days ago, stable since that time. Treatment to date: OTC meds for cough. She is drinking moderate amounts of fluids. Eating ok. No SOB, but jenny a heaviness in her chest, or a tightness. She is breathing fine though. Pt has anxiety and wonders if that is part of it. No fevers, temp up 99. No loss of sense of smell or taste. No new N/V/D. Patient's past medical, surgical, social and/or family history reviewed and are non-contributory (unless otherwise stated). Medications and allergies also reviewed.  HTN, DMII controlled, anxiety    ROS Unless otherwise specified above  Review of Systems - General ROS: negative for - fever, chills, fatigue, night sweats  ENT ROS: negative for - headaches, nasal congestion, nasal discharge, or sinus pain  Hematological and Lymphatic ROS: negative for - bleeding problems, fatigue or swollen lymph nodes  Respiratory ROS: negative for - cough, orthopnea, shortness of breath, sputum changes, tachypnea or wheezing  Cardiovascular ROS: negative for - chest pain, dyspnea on exertion, irregular heartbeat, loss of consciousness, or palpitations  Gastrointestinal ROS: negative for - abdominal pain, diarrhea, gas/bloating, heartburn or nausea/vomiting  Musculoskeletal ROS: negative for - myalgias, joint pain, joint stiffness, joint swelling or back pain  Neurological ROS: negative for - behavioral changes, confusion, dizziness, or headaches  Dermatological ROS: negative for - rash or skin lesion changes  Psychological ROS: negative for - acute mood changes     Physical Exam BP (!) 140/76   Pulse 75   Temp 99.7 °F (37.6 °C) (Oral)   Ht 5' 6\" (1.676 m)   Wt 215 lb (97.5 kg)   SpO2 97%   BMI 34.70 kg/m²     Wt Readings from Last 3 Encounters:   12/18/20 215 lb (97.5 kg)   12/10/20 215 lb 8 oz (97.8 kg)   09/25/20 214 lb (97.1 kg)     BP Readings from Last 3 Encounters:   12/18/20 (!) 140/76   12/10/20 132/80   09/25/20 (!) 151/86         General appearance: alert, well appearing, and in no distress, oriented to person, place, and time and normal appearing weight. HEENT:  HEAD: Atraumatic, normocephalic  EARS: left TM WNL, right slightly injected; B/L external ear canals normal  NOSE: nasal mucosa, septum, turbinates normal bilaterally  MOUTH: mucous membranes moist and normal tonsils  NECK: supple, full range of motion, no mass, normal lymphadenopathy, no thyromegaly    CVS exam: normal rate, regular rhythm, normal S1, S2, no murmurs, rubs, clicks or gallops. CHEST: clear to auscultation, no wheezes, rales or rhonchi, symmetric air entry. ABD: soft, NT  SKIN: no jaundice or pallor        Assessment/Plan  Rina BAILEY was seen today for cough. Diagnoses and all orders for this visit:    Encounter for laboratory testing for COVID-19 virus  -     POCT COVID-19, Antigen      Quarantine recommendations given. Other precautions and supportive care reviewed. If any worsening sx to seek care early. Encouraged to f/u with PCP when safe to do so for elevated BP, and general health. Advised on symptomatic relief. Tylenol or Advil for fever/pain/body aches. OTC meds prn cough/ congestion. Rest. Stay hydrated. Advised to call PCP in 3-5 days for recheck if symptoms persist. ED sooner if symptoms worsen or change. ED immediately with high or refractory fever, SOB, CP, shaking chills, vomiting, abdominal pain, etc. Pt is in agreement with this care plan. All questions answered. Return if symptoms worsen or fail to improve.       Scottie Kumari MD Call or go to ED immediately if symptoms worsen or persist.    Counseled regarding above diagnosis, including possible risks and complications,  especially if left uncontrolled. If medications prescribed:  Counseled regarding the possible side effects, risks, benefits and alternatives to treatment; patient and/or guardian verbalizes understanding, agrees, feels comfortable with and wishes to proceed with above treatment plan. Advised patient to call with any new medication issues, and read all Rx info from pharmacy to assure aware of all possible risks and side effects of medication before taking. Patient and/or guardian verbalizes understanding and agrees with above counseling, assessment and plan. All questions answered.

## 2020-12-18 NOTE — LETTER
NOTIFICATION RETURN TO WORK / SCHOOL    12/18/2020    [facility]      To Whom It May Concern:    Sakshi Goldstein was tested for COVID-19 on 12/18, and the result was positive. She will need to quarantine for a minimum of 10 days from when her symptoms started. Patient will need to be improved at that time and also fever free for at least 24 hours without fever reducing medications in order to return to work. If there are questions or concerns, please have the patient contact our office.         Sincerely,      Sheila Schwartz MD

## 2020-12-20 ENCOUNTER — TELEPHONE (OUTPATIENT)
Dept: FAMILY MEDICINE CLINIC | Age: 55
End: 2020-12-20

## 2020-12-20 NOTE — TELEPHONE ENCOUNTER
Patient was seen in flu clinic with possible Covid symptoms    Rapid test was positive    Please ask how patient symptoms are progressing and if needs follow-up virtual or otherwise in the near future    Thanks

## 2020-12-21 NOTE — TELEPHONE ENCOUNTER
Pt declined f/u at this time. Had a sore throat and body aches but those symptoms are lessening. Fatigue is worse today. Denies SOB and fever. Has a cough and is using OTC meds.

## 2020-12-23 ENCOUNTER — TELEPHONE (OUTPATIENT)
Dept: FAMILY MEDICINE CLINIC | Age: 55
End: 2020-12-23

## 2020-12-23 NOTE — TELEPHONE ENCOUNTER
Requested Prescriptions     Signed Prescriptions Disp Refills    HYDROcodone-homatropine (HYCODAN) 5-1.5 MG/5ML syrup 50 mL 0     Sig: Take 2.5 mLs by mouth 4 times daily as needed (cough, covid) for up to 5 days.      Authorizing Provider: Kaitlyn Quinteros to pharmacy

## 2020-12-23 NOTE — TELEPHONE ENCOUNTER
Rina has Covid and coughing very bad. She has tried a couple over the counter cough meds and having very little success. She is asking if you can prescribe something instead? Please [facility] to [facility].

## 2020-12-28 ENCOUNTER — TELEPHONE (OUTPATIENT)
Dept: FAMILY MEDICINE CLINIC | Age: 55
End: 2020-12-28

## 2020-12-28 NOTE — TELEPHONE ENCOUNTER
[id number] Daya Polanco for work excuse    If not feeling well would advise flu clinic or ER if worsening

## 2020-12-28 NOTE — TELEPHONE ENCOUNTER
Rina calling to tell you that she is still coughing severely, has some SOB and is very fatigued, with body aches and weakness. She is asking if you will give her a note to be off work and to return to work on Monday Jan 4th.

## 2021-01-07 ENCOUNTER — TELEPHONE (OUTPATIENT)
Dept: SURGERY | Age: 56
End: 2021-01-07

## 2021-01-07 NOTE — TELEPHONE ENCOUNTER
Patient called and needed to reschedule her colonoscopy due to personal issues. MA rescheduled colonoscopy to 03-29-21 at [facility] at 9:00 am, arrive at 8:00 am.  MA mailed out new surgery and follow up letters along with colonoscopy prep.   Electronically signed by Amber Santiago MA on 1/7/2021 at 1:58 PM

## 2021-02-13 ENCOUNTER — OFFICE VISIT (OUTPATIENT)
Dept: FAMILY MEDICINE CLINIC | Age: 56
End: 2021-02-13
Payer: COMMERCIAL

## 2021-02-13 VITALS
HEIGHT: 66 IN | SYSTOLIC BLOOD PRESSURE: 138 MMHG | WEIGHT: 227 LBS | BODY MASS INDEX: 36.48 KG/M2 | HEART RATE: 73 BPM | DIASTOLIC BLOOD PRESSURE: 82 MMHG | TEMPERATURE: 97 F | OXYGEN SATURATION: 98 % | RESPIRATION RATE: 16 BRPM

## 2021-02-13 DIAGNOSIS — K08.89 TOOTHACHE: Primary | ICD-10-CM

## 2021-02-13 DIAGNOSIS — L03.211 CELLULITIS, FACE: Primary | ICD-10-CM

## 2021-02-13 PROCEDURE — G8427 DOCREV CUR MEDS BY ELIG CLIN: HCPCS | Performed by: FAMILY MEDICINE

## 2021-02-13 PROCEDURE — G8417 CALC BMI ABV UP PARAM F/U: HCPCS | Performed by: FAMILY MEDICINE

## 2021-02-13 PROCEDURE — 99213 OFFICE O/P EST LOW 20 MIN: CPT | Performed by: FAMILY MEDICINE

## 2021-02-13 PROCEDURE — 3017F COLORECTAL CA SCREEN DOC REV: CPT | Performed by: FAMILY MEDICINE

## 2021-02-13 PROCEDURE — 1036F TOBACCO NON-USER: CPT | Performed by: FAMILY MEDICINE

## 2021-02-13 PROCEDURE — G8482 FLU IMMUNIZE ORDER/ADMIN: HCPCS | Performed by: FAMILY MEDICINE

## 2021-02-13 RX ORDER — HYDROCODONE BITARTRATE AND ACETAMINOPHEN 5; 325 MG/1; MG/1
1 TABLET ORAL EVERY 6 HOURS PRN
Qty: 20 TABLET | Refills: 0 | Status: SHIPPED | OUTPATIENT
Start: 2021-02-13 | End: 2021-02-18

## 2021-02-13 RX ORDER — CLINDAMYCIN HYDROCHLORIDE 300 MG/1
300 CAPSULE ORAL 3 TIMES DAILY
Qty: 30 CAPSULE | Refills: 0 | Status: SHIPPED | OUTPATIENT
Start: 2021-02-13 | End: 2021-02-23

## 2021-02-13 ASSESSMENT — ENCOUNTER SYMPTOMS
NAUSEA: 0
SHORTNESS OF BREATH: 0
CONSTIPATION: 0
BACK PAIN: 0
SINUS PAIN: 0
EYE PAIN: 0
ABDOMINAL PAIN: 0
VOMITING: 0
TROUBLE SWALLOWING: 0
CHEST TIGHTNESS: 0
SORE THROAT: 0
COUGH: 0
WHEEZING: 0
DIARRHEA: 0

## 2021-02-13 NOTE — PROGRESS NOTES
Hanny Fox (:  1965) is a 50 y.o. female,Established patient, here for evaluation of the following chief complaint(s):  Oral Pain (states patient saw dentist and was told she needs a tooth pulled but can't be scheduled until April, thinks infected)      ASSESSMENT/PLAN:  1. Cellulitis, face  Comments:  clindamycin  needs to get in with her dentist  add listerine mouth rinses  motrin for the pain 600mg tid as needed      Return if symptoms worsen or fail to improve, for f/u with dentist, sooner rather than later. SUBJECTIVE/OBJECTIVE:  Patient here with right sided tooth pain  She saw the dentist last week and has a tooth that  Needed a root canal or pulled and she opted to have it pulled but they cannot get her in now for over a month    She has bbeen having mild pain and motrin was helping  But then last evening she bit down on something really hard chewing and had severe pain  [place] like something electrical initially, the pain calmed down about midnight  Today when she woke up the pain was still pretty severe and now right side of face is swollen  She has been using ice, oragel, mouth wash and motrin but still hurts  She called the dentist but has not heard back from them          Review of Systems   Constitutional: Negative for appetite change, fatigue and fever. HENT: Negative for congestion, ear pain, sinus pain, sore throat and trouble swallowing. Mouth pain   Eyes: Negative for pain. Respiratory: Negative for cough, chest tightness, shortness of breath and wheezing. Cardiovascular: Negative for chest pain, palpitations and leg swelling. Gastrointestinal: Negative for abdominal pain, constipation, diarrhea, nausea and vomiting. Endocrine: Negative for cold intolerance and heat intolerance. Genitourinary: Negative for difficulty urinating, hematuria and pelvic pain. Musculoskeletal: Negative for back pain, gait problem and joint swelling. Skin: Negative for rash and wound. Neurological: Negative for dizziness, syncope and headaches. Hematological: Negative for adenopathy. Psychiatric/Behavioral: Negative for confusion, sleep disturbance and suicidal ideas. Physical Exam  Vitals signs and nursing note reviewed. Constitutional:       Appearance: Normal appearance. She is well-developed. HENT:      Head: Normocephalic and atraumatic. Mouth/Throat:      Mouth: Mucous membranes are moist.      Comments: Bottom right molar appears to be the tooth, gums red and swollen around the base of the tooth  Eyes:      Pupils: Pupils are equal, round, and reactive to light. Neck:      Musculoskeletal: Normal range of motion. Cardiovascular:      Rate and Rhythm: Normal rate and regular rhythm. Pulmonary:      Effort: Pulmonary effort is normal.      Breath sounds: Normal breath sounds. Skin:     General: Skin is warm and dry. Neurological:      Mental Status: She is alert. An electronic signature was used to authenticate this note.     --Blase Base, DO

## 2021-02-14 ENCOUNTER — OFFICE VISIT (OUTPATIENT)
Dept: FAMILY MEDICINE CLINIC | Age: 56
End: 2021-02-14
Payer: COMMERCIAL

## 2021-02-14 VITALS
DIASTOLIC BLOOD PRESSURE: 74 MMHG | SYSTOLIC BLOOD PRESSURE: 122 MMHG | TEMPERATURE: 98 F | HEART RATE: 70 BPM | RESPIRATION RATE: 18 BRPM | BODY MASS INDEX: 36.48 KG/M2 | HEIGHT: 66 IN | WEIGHT: 227 LBS | OXYGEN SATURATION: 99 %

## 2021-02-14 DIAGNOSIS — K04.7 DENTAL ABSCESS: Primary | ICD-10-CM

## 2021-02-14 PROCEDURE — G8417 CALC BMI ABV UP PARAM F/U: HCPCS | Performed by: FAMILY MEDICINE

## 2021-02-14 PROCEDURE — 99213 OFFICE O/P EST LOW 20 MIN: CPT | Performed by: FAMILY MEDICINE

## 2021-02-14 PROCEDURE — 1036F TOBACCO NON-USER: CPT | Performed by: FAMILY MEDICINE

## 2021-02-14 PROCEDURE — G8482 FLU IMMUNIZE ORDER/ADMIN: HCPCS | Performed by: FAMILY MEDICINE

## 2021-02-14 PROCEDURE — 3017F COLORECTAL CA SCREEN DOC REV: CPT | Performed by: FAMILY MEDICINE

## 2021-02-14 PROCEDURE — G8427 DOCREV CUR MEDS BY ELIG CLIN: HCPCS | Performed by: FAMILY MEDICINE

## 2021-02-14 RX ORDER — AMOXICILLIN 500 MG/1
1 TABLET, FILM COATED ORAL 3 TIMES DAILY
Qty: 30 TABLET | Refills: 1 | Status: SHIPPED
Start: 2021-02-14 | End: 2021-04-12 | Stop reason: ALTCHOICE

## 2021-02-14 ASSESSMENT — ENCOUNTER SYMPTOMS
EYES NEGATIVE: 1
RESPIRATORY NEGATIVE: 1

## 2021-02-14 NOTE — PROGRESS NOTES
21  Milton Almanza : 1965 Sex: female  Age: 50 y.o. Chief Complaint   Patient presents with    Facial Swelling     Pt c/o tooth infection of RL side, R side of face is swollen, painful, redness, difficulty opening mouth       Patient is a 81-year-old white female who is in today for large facial swelling secondary to dental abscess on the left lower mandible in the rear of the molar area. She has been on clindamycin with some mild improvement before he can discontinue that and switch her to Amoxil today and refer her to the dental clinic. Review of Systems   Constitutional: Negative. HENT: Positive for dental problem. Eyes: Negative. Respiratory: Negative. Cardiovascular: Negative. Current Outpatient Medications:     Amoxicillin 500 MG TABS, Take 1 tablet by mouth 3 times daily, Disp: 30 tablet, Rfl: 1    clindamycin (CLEOCIN) 300 MG capsule, Take 1 capsule by mouth 3 times daily for 10 days, Disp: 30 capsule, Rfl: 0    HYDROcodone-acetaminophen (NORCO) 5-325 MG per tablet, Take 1 tablet by mouth every 6 hours as needed for Pain for up to 5 days. Intended supply: 5 days.  Take lowest dose possible to manage pain, Disp: 20 tablet, Rfl: 0    vitamin D (ERGOCALCIFEROL) 1.25 MG (20541 UT) CAPS capsule, Take 1 capsule by mouth once a week, Disp: 12 capsule, Rfl: 1    losartan (COZAAR) 25 MG tablet, Take 1 tablet by mouth daily, Disp: 90 tablet, Rfl: 1    sertraline (ZOLOFT) 100 MG tablet, Take 100 mg by mouth daily , Disp: , Rfl:     hydrOXYzine (VISTARIL) 25 MG capsule, Take 25 mg by mouth 3 times daily as needed for Itching, Disp: , Rfl:     cyclobenzaprine (FLEXERIL) 10 MG tablet, Take 1 tablet by mouth 3 times daily as needed for Muscle spasms (Patient not taking: Reported on 2021), Disp: 60 tablet, Rfl: 2  Allergies   Allergen Reactions    Albuterol      Made her feel funny        Past Medical History:   Diagnosis Date    Diabetes mellitus ([facility] [address] Refill:  1        Patient advised to follow up with PCP as needed. Seen By:  Xavier Izaguirre, DO  He was referred to the dental clinic she is to discontinue the clindamycin and stay on the Amoxil take Aleve 3 tablets today and further treatment is pending dental consultation.

## 2021-03-19 NOTE — PROGRESS NOTES
Have you been tested for COVID  Yes           Have you been told you were positive for COVID Yes  Have you had any known exposure to someone that is positive for COVID No  Do you have a cough                   No              Do you have shortness of breath No                 Do you have a sore throat            No                Are you having chills                    No                Are you having muscle aches. No                    Please come to the hospital wearing a mask and have your significant other wear a mask as well. Both of you should check your temperature before leaving to come here,  if it is 100 or higher please call [phone] for instruction.

## 2021-03-24 ENCOUNTER — HOSPITAL ENCOUNTER (OUTPATIENT)
Age: 56
Discharge: HOME | End: 2021-03-26
Payer: COMMERCIAL

## 2021-03-24 PROCEDURE — U0005 INFEC AGEN DETEC AMPLI PROBE: HCPCS

## 2021-03-24 PROCEDURE — U0003 INFECTIOUS AGENT DETECTION BY NUCLEIC ACID (DNA OR RNA); SEVERE ACUTE RESPIRATORY SYNDROME CORONAVIRUS 2 (SARS-COV-2) (CORONAVIRUS DISEASE [COVID-19]), AMPLIFIED PROBE TECHNIQUE, MAKING USE OF HIGH THROUGHPUT TECHNOLOGIES AS DESCRIBED BY CMS-2020-01-R: HCPCS

## 2021-03-25 DIAGNOSIS — Z01.818 PREOP TESTING: ICD-10-CM

## 2021-03-26 LAB
SARS-COV-2: NOT DETECTED
SOURCE: NORMAL

## 2021-03-29 ENCOUNTER — ANESTHESIA EVENT (OUTPATIENT)
Dept: ENDOSCOPY | Age: 56
End: 2021-03-29
Payer: COMMERCIAL

## 2021-03-29 ENCOUNTER — ANESTHESIA (OUTPATIENT)
Dept: ENDOSCOPY | Age: 56
End: 2021-03-29
Payer: COMMERCIAL

## 2021-03-29 ENCOUNTER — HOSPITAL ENCOUNTER (OUTPATIENT)
Age: 56
Setting detail: OUTPATIENT SURGERY
Discharge: HOME | End: 2021-03-29
Attending: SURGERY | Admitting: SURGERY
Payer: COMMERCIAL

## 2021-03-29 VITALS
SYSTOLIC BLOOD PRESSURE: 106 MMHG | OXYGEN SATURATION: 98 % | DIASTOLIC BLOOD PRESSURE: 60 MMHG | RESPIRATION RATE: 12 BRPM

## 2021-03-29 VITALS
BODY MASS INDEX: 35.36 KG/M2 | WEIGHT: 220 LBS | TEMPERATURE: 97 F | SYSTOLIC BLOOD PRESSURE: 138 MMHG | RESPIRATION RATE: 16 BRPM | HEIGHT: 66 IN | DIASTOLIC BLOOD PRESSURE: 77 MMHG | HEART RATE: 63 BPM | OXYGEN SATURATION: 96 %

## 2021-03-29 DIAGNOSIS — Z01.818 PREOP TESTING: Primary | ICD-10-CM

## 2021-03-29 LAB — METER GLUCOSE: 126 MG/DL (ref 74–99)

## 2021-03-29 PROCEDURE — 3609027000 HC COLONOSCOPY: Performed by: SURGERY

## 2021-03-29 PROCEDURE — 2709999900 HC NON-CHARGEABLE SUPPLY: Performed by: SURGERY

## 2021-03-29 PROCEDURE — 45378 DIAGNOSTIC COLONOSCOPY: CPT | Performed by: SURGERY

## 2021-03-29 PROCEDURE — 7100000011 HC PHASE II RECOVERY - ADDTL 15 MIN: Performed by: SURGERY

## 2021-03-29 PROCEDURE — 82962 GLUCOSE BLOOD TEST: CPT

## 2021-03-29 PROCEDURE — 2580000003 HC RX 258: Performed by: NURSE ANESTHETIST, CERTIFIED REGISTERED

## 2021-03-29 PROCEDURE — 3700000000 HC ANESTHESIA ATTENDED CARE: Performed by: SURGERY

## 2021-03-29 PROCEDURE — 7100000010 HC PHASE II RECOVERY - FIRST 15 MIN: Performed by: SURGERY

## 2021-03-29 PROCEDURE — 3700000001 HC ADD 15 MINUTES (ANESTHESIA): Performed by: SURGERY

## 2021-03-29 PROCEDURE — 6360000002 HC RX W HCPCS: Performed by: NURSE ANESTHETIST, CERTIFIED REGISTERED

## 2021-03-29 RX ORDER — PROPOFOL 10 MG/ML
INJECTION, EMULSION INTRAVENOUS PRN
Status: DISCONTINUED | OUTPATIENT
Start: 2021-03-29 | End: 2021-03-29 | Stop reason: SDUPTHER

## 2021-03-29 RX ORDER — SODIUM CHLORIDE 9 MG/ML
INJECTION, SOLUTION INTRAVENOUS CONTINUOUS PRN
Status: DISCONTINUED | OUTPATIENT
Start: 2021-03-29 | End: 2021-03-29 | Stop reason: SDUPTHER

## 2021-03-29 RX ADMIN — SODIUM CHLORIDE: 9 INJECTION, SOLUTION INTRAVENOUS at 08:55

## 2021-03-29 RX ADMIN — PROPOFOL 280 MG: 10 INJECTION, EMULSION INTRAVENOUS at 08:59

## 2021-03-29 ASSESSMENT — LIFESTYLE VARIABLES: SMOKING_STATUS: 0

## 2021-03-29 ASSESSMENT — PAIN SCALES - GENERAL: PAINLEVEL_OUTOF10: 0

## 2021-03-29 NOTE — OP NOTE
Colonoscopy Op Note    DATE OF PROCEDURE: 3/29/2021    SURGEON: Pascual Whalen MD    PREOPERATIVE DIAGNOSIS: High risk colorectal cancer screening with history of Familial history of colon cancer    POSTOPERATIVE DIAGNOSIS: Same, normal colonoscopy    OPERATION: Procedure(s):  COLORECTAL CANCER SCREENING, NOT HIGH RISK    ANESTHESIA: Local monitored anesthesia. ESTIMATED BLOOD LOSS: nil     COMPLICATIONS: None. SPECIMENS:   * No specimens in log *    HISTORY: The patient is a 50y.o. year old female with history of above preop diagnosis. I recommended colonoscopy with possible biopsy or polypectomy and I explained the risk, benefits, expected outcome, and alternatives to the procedure. Risks included but are not limited to bleeding, infection, respiratory distress, hypotension, and perforation of the colon. The patient understands and is in agreement. PROCEDURE: The patient was given IV conscious sedation per anesthesia. The patient was given supplemental oxygen by nasal cannula. The colonoscope was inserted per rectum and advanced under direct vision to the cecum without difficulty, identified by appendiceal orifice and ileocecal valve. The prep was good so exam was adequate. FINDINGS:    YAYO: normal    Terminal Ileum: normal    Cecum/Ascending colon: normal    Transverse colon: normal    Descending/Sigmoid colon: normal    Rectum/Anus: examined in normal and retroflexed positions and was normal    The colon was decompressed and the scope was removed. The withdraw time was approximately 10 minutes. The patient tolerated the procedure well. ASSESSMENT/PLAN:     1. Colorectal Cancer Screening - recommend repeat colonoscopy in 5 years (may change pending biopsy results). Sooner if issues/concerns.     Pascual Whalen MD  03/29/21  9:14 AM

## 2021-03-29 NOTE — ANESTHESIA POSTPROCEDURE EVALUATION
Department of Anesthesiology  Postprocedure Note    Patient: Thomas Brewster  MRN: [id number]  YOB: 1965  Date of evaluation: 3/29/2021  Time:  9:55 AM     Procedure Summary     Date: 03/29/21 Room / Location: [place] [address] / [facility]    Anesthesia Start: 7253 Anesthesia Stop: 6159    Procedure: COLORECTAL CANCER SCREENING, NOT HIGH RISK (N/A ) Diagnosis: (SCREENING)    Surgeons: Henny Ching MD Responsible Provider: Cherise Green MD    Anesthesia Type: MAC ASA Status: 3          Anesthesia Type: MAC    Rhiannon Phase I: Rhiannon Score: 10    Rhiannon Phase II: Rhiannon Score: 10    Last vitals: Reviewed and per EMR flowsheets.        Anesthesia Post Evaluation    Patient location during evaluation: PACU  Patient participation: complete - patient participated  Level of consciousness: awake and alert  Pain score: 0  Airway patency: patent  Nausea & Vomiting: no vomiting and no nausea  Complications: no  Cardiovascular status: hemodynamically stable  Respiratory status: spontaneous ventilation  Hydration status: stable

## 2021-03-29 NOTE — ANESTHESIA PRE PROCEDURE
Department of Anesthesiology  Preprocedure Note       Name:  Lenny Aldrich   Age:  50 y.o.  :  1965                                          MRN:  [id number]         Date:  3/29/2021      Surgeon: Mayra Jones):  Sunny Braden MD    Procedure: Procedure(s):  COLORECTAL CANCER SCREENING, NOT HIGH RISK    Medications prior to admission:   Prior to Admission medications    Medication Sig Start Date End Date Taking? Authorizing Provider   vitamin D (ERGOCALCIFEROL) 1.25 MG (10997 UT) CAPS capsule Take 1 capsule by mouth once a week  Patient taking differently: Take 50,000 Units by mouth once a week TUES 12/10/20  Yes Vangie Garcia MD   sertraline (ZOLOFT) 100 MG tablet Take 100 mg by mouth daily  20  Yes Historical Provider, MD   hydrOXYzine (VISTARIL) 25 MG capsule Take 25 mg by mouth 3 times daily as needed for Anxiety    Yes Historical Provider, MD   cyclobenzaprine (FLEXERIL) 10 MG tablet Take 1 tablet by mouth 3 times daily as needed for Muscle spasms 20  Yes Vangie Garcia MD   Amoxicillin 500 MG TABS Take 1 tablet by mouth 3 times daily  Patient not taking: Reported on 3/19/2021 2/14/21   Tripp Man DO   losartan (COZAAR) 25 MG tablet Take 1 tablet by mouth daily 12/10/20   Vangie Garcia MD       Current medications:    No current facility-administered medications for this encounter. Allergies:     Allergies   Allergen Reactions    Albuterol      Made her feel funny        Problem List:    Patient Active Problem List   Diagnosis Code    Chest pain, unspecified R07.9    Diabetes mellitus ([facility] [address] E11.9    Essential hypertension I10    Sleep apnea G47.30    Former smoker Z87.891    Generalized anxiety disorder F41.1    Class 2 obesity due to excess calories without serious comorbidity with body mass index (BMI) of 39.0 to 39.9 in adult E66.09, Z68.39    Vitamin D deficiency E55.9       Past Medical History:        Diagnosis Date    COVID-19 2020    Diabetes mellitus ([facility] [address]     Essential hypertension     Essential tremor     Generalized anxiety disorder 2020    Sleep apnea        Past Surgical History:        Procedure Laterality Date     SECTION      COLONOSCOPY      10 years ago    FEMUR SURGERY Right        Social History:    Social History     Tobacco Use    Smoking status: Former Smoker     Packs/day: 0.75     Years: 20.00     Pack years: 15.00     Types: Cigarettes     Start date: 1998     Quit date: 3/4/2018     Years since quitting: 3.0    Smokeless tobacco: Never Used   Substance Use Topics    Alcohol use: Yes     Frequency: 2-4 times a month                                Counseling given: Not Answered      Vital Signs (Current):   Vitals:    21 1604 21 0819   BP:  132/67   Pulse:  59   Resp:  18   Temp:  97.9 °F (36.6 °C)   TempSrc:  Temporal   SpO2:  97%   Weight: 220 lb (99.8 kg) 220 lb (99.8 kg)   Height: 5' 6\" (1.676 m) 5' 6\" (1.676 m)                                              BP Readings from Last 3 Encounters:   21 132/67   21 122/74   21 138/82       NPO Status:                                                                                 BMI:   Wt Readings from Last 3 Encounters:   21 220 lb (99.8 kg)   21 227 lb (103 kg)   21 227 lb (103 kg)     Body mass index is 35.51 kg/m².     CBC:   Lab Results   Component Value Date    WBC 4.5 2020    RBC 4.50 2020    HGB 12.9 2020    HCT 39.7 2020    MCV 88.2 2020    RDW 13.3 2020     2020       CMP:   Lab Results   Component Value Date     2020    K 4.0 2020    K 4.0 2020     2020    CO2 26 2020    BUN 14 2020    CREATININE 0.9 2020    GFRAA >60 2020    LABGLOM >60 2020    GLUCOSE 110 2020    PROT 6.6 2020    CALCIUM 9.0 2020    BILITOT 0.5 2020    ALKPHOS 60 2020    AST 13 2020

## 2021-03-29 NOTE — H&P
[facility] Note     Assessment/Plan:        Diagnosis Orders   1. Encounter for screening colonoscopy        Will schedule colonoscopy.            Return for Colonoscopy.             Chief Complaint   Patient presents with    New Patient       ref by Dr. Davy Silva for a colonoscopy. gynocologist found some rectal bleeding when she had an exam. been 10 years since her last one.          PCP: Harley Bateman MD     HPI: Shannan Gibbons is a 50 y.o. female who presents in consultation for colonoscopy. Her last one was 10 years ago and was for bleeding issues. She has had no recent bleeding. She does have some loose stools that she reports is from her medications. She denies any abdominal pain. There is a family history of colon cancer in her maternal and paternal uncles.        Past Medical History        Past Medical History:   Diagnosis Date    Diabetes mellitus ([facility] [address]      Essential hypertension      Essential tremor      Generalized anxiety disorder 2020    Sleep apnea              Past Surgical History         Past Surgical History:   Procedure Laterality Date     SECTION        COLONOSCOPY         10 years ago    FEMUR SURGERY Right              Home Medications           Prior to Admission medications    Medication Sig Start Date End Date Taking?  Authorizing Provider   sertraline (ZOLOFT) 100 MG tablet   20   Yes Historical Provider, MD   lisinopril (PRINIVIL;ZESTRIL) 10 MG tablet Take 1 tablet by mouth daily 20   Yes Harley Bateman MD   vitamin D (ERGOCALCIFEROL) 1.25 MG (87870 UT) CAPS capsule Take 1 capsule by mouth once a week 20   Yes Harley Bateman MD   hydrOXYzine (VISTARIL) 25 MG capsule Take 25 mg by mouth 3 times daily as needed for Itching     Yes Historical Provider, MD   metFORMIN (GLUCOPHAGE) 500 MG tablet Take 1 tablet by mouth 2 times daily (with meals) 20   Yes Harley Bateman MD   cyclobenzaprine (FLEXERIL) 10 MG tablet Take 1 tablet by mouth 3 times daily as needed for Muscle spasms 20   Yes Fei Reynolds MD   hydrOXYzine (ATARAX) 25 MG tablet   20     Historical Provider, MD                 Allergies   Allergen Reactions    Albuterol           Social History   Social History            Socioeconomic History    Marital status:        Spouse name: None    Number of children: None    Years of education: None    Highest education level: None   Occupational History    None   Social Needs    Financial resource strain: None    Food insecurity       Worry: None       Inability: None    Transportation needs       Medical: None       Non-medical: None   Tobacco Use    Smoking status: Former Smoker       Packs/day: 0.75       Years: 20.00       Pack years: 15.00       Types: Cigarettes       Start date: 1998       Last attempt to quit: 3/4/2018       Years since quittin.5    Smokeless tobacco: Never Used   Substance and Sexual Activity    Alcohol use:  Yes       Frequency: 2-4 times a month    Drug use: Never    Sexual activity: None   Lifestyle    Physical activity       Days per week: None       Minutes per session: None    Stress: None   Relationships    Social connections       Talks on phone: None       Gets together: None       Attends Latter day service: None       Active member of club or organization: None       Attends meetings of clubs or organizations: None       Relationship status: None    Intimate partner violence       Fear of current or ex partner: None       Emotionally abused: None       Physically abused: None       Forced sexual activity: None   Other Topics Concern    None   Social History Narrative    None            Family History         Family History   Problem Relation Age of Onset    Hypothyroidism Mother      Hypotension Mother      Cancer Father           gallbladder  52yr    Hypotension Brother      Heart Attack Other           Uncle on father's side  age 44            Review of Systems   All other systems reviewed and are negative.                  Objective:  Vitals       Vitals:     20 1040   BP: (!) 151/86   Site: Right Upper Arm   Position: Sitting   Cuff Size: Medium Adult   Pulse: 70   Resp: 18   Temp: 96.8 °F (36 °C)   TempSrc: Temporal   SpO2: 95%   Weight: 214 lb (97.1 kg)   Height: 5' 6\" (1.676 m)            Physical Exam  HENT:      Head: Normocephalic and atraumatic. Eyes:      General:         Right eye: No discharge. Left eye: No discharge. Neck:      Trachea: No tracheal deviation. Cardiovascular:      Rate and Rhythm: Normal rate. Pulmonary:      Effort: Pulmonary effort is normal. No respiratory distress. Abdominal:      General: There is no distension. Palpations: Abdomen is soft. Tenderness: There is no abdominal tenderness. There is no guarding or rebound. Skin:     General: Skin is warm and dry. Neurological:      Mental Status: She is alert and oriented to person, place, and time.                      Annita Earl MD       NOTE: This report, in part or full,may have been transcribed using voice recognition software. Every effort was made to ensure accuracy; however, inadvertent computerized transcription errors may be present.  Please excuse any transcriptional grammatical or spelling errors that may have escaped my editorial review.     CC: Bhakti Davila MD

## 2021-04-12 ENCOUNTER — OFFICE VISIT (OUTPATIENT)
Dept: FAMILY MEDICINE CLINIC | Age: 56
End: 2021-04-12
Payer: COMMERCIAL

## 2021-04-12 VITALS
WEIGHT: 229 LBS | BODY MASS INDEX: 36.8 KG/M2 | OXYGEN SATURATION: 98 % | SYSTOLIC BLOOD PRESSURE: 130 MMHG | DIASTOLIC BLOOD PRESSURE: 88 MMHG | HEART RATE: 78 BPM | TEMPERATURE: 97.9 F | HEIGHT: 66 IN

## 2021-04-12 DIAGNOSIS — M79.89 AXILLARY SWELLING: ICD-10-CM

## 2021-04-12 DIAGNOSIS — E66.09 CLASS 2 OBESITY DUE TO EXCESS CALORIES WITHOUT SERIOUS COMORBIDITY WITH BODY MASS INDEX (BMI) OF 39.0 TO 39.9 IN ADULT: ICD-10-CM

## 2021-04-12 DIAGNOSIS — F41.9 ANXIETY: ICD-10-CM

## 2021-04-12 DIAGNOSIS — G47.33 OBSTRUCTIVE SLEEP APNEA SYNDROME: ICD-10-CM

## 2021-04-12 DIAGNOSIS — M25.511 ACUTE PAIN OF RIGHT SHOULDER: ICD-10-CM

## 2021-04-12 DIAGNOSIS — R53.83 OTHER FATIGUE: ICD-10-CM

## 2021-04-12 DIAGNOSIS — Z82.49 FAMILY HX OF ISCHEM HEART DIS AND OTH DIS OF THE CIRC SYS: ICD-10-CM

## 2021-04-12 DIAGNOSIS — L65.9 ALOPECIA: ICD-10-CM

## 2021-04-12 DIAGNOSIS — E11.65 TYPE 2 DIABETES MELLITUS WITH HYPERGLYCEMIA, WITHOUT LONG-TERM CURRENT USE OF INSULIN (HCC): ICD-10-CM

## 2021-04-12 DIAGNOSIS — F41.1 GENERALIZED ANXIETY DISORDER: ICD-10-CM

## 2021-04-12 DIAGNOSIS — E55.9 VITAMIN D DEFICIENCY: ICD-10-CM

## 2021-04-12 DIAGNOSIS — L65.9 ALOPECIA: Primary | ICD-10-CM

## 2021-04-12 DIAGNOSIS — I10 ESSENTIAL HYPERTENSION: ICD-10-CM

## 2021-04-12 LAB — TESTOSTERONE TOTAL: <12 NG/DL

## 2021-04-12 PROCEDURE — 3046F HEMOGLOBIN A1C LEVEL >9.0%: CPT | Performed by: INTERNAL MEDICINE

## 2021-04-12 PROCEDURE — 3017F COLORECTAL CA SCREEN DOC REV: CPT | Performed by: INTERNAL MEDICINE

## 2021-04-12 PROCEDURE — 1036F TOBACCO NON-USER: CPT | Performed by: INTERNAL MEDICINE

## 2021-04-12 PROCEDURE — 99214 OFFICE O/P EST MOD 30 MIN: CPT | Performed by: INTERNAL MEDICINE

## 2021-04-12 PROCEDURE — 2022F DILAT RTA XM EVC RTNOPTHY: CPT | Performed by: INTERNAL MEDICINE

## 2021-04-12 PROCEDURE — G8427 DOCREV CUR MEDS BY ELIG CLIN: HCPCS | Performed by: INTERNAL MEDICINE

## 2021-04-12 PROCEDURE — G8417 CALC BMI ABV UP PARAM F/U: HCPCS | Performed by: INTERNAL MEDICINE

## 2021-04-12 RX ORDER — ERGOCALCIFEROL 1.25 MG/1
50000 CAPSULE ORAL WEEKLY
Qty: 12 CAPSULE | Refills: 1 | Status: SHIPPED
Start: 2021-04-12 | End: 2021-10-05 | Stop reason: SDUPTHER

## 2021-04-12 RX ORDER — LOSARTAN POTASSIUM 25 MG/1
25 TABLET ORAL DAILY
Qty: 90 TABLET | Refills: 1 | Status: SHIPPED
Start: 2021-04-12 | End: 2021-10-05 | Stop reason: SDUPTHER

## 2021-04-12 RX ORDER — HYDROCHLOROTHIAZIDE 12.5 MG/1
12.5 CAPSULE, GELATIN COATED ORAL EVERY MORNING
Qty: 30 CAPSULE | Refills: 5 | Status: SHIPPED
Start: 2021-04-12 | End: 2021-10-05 | Stop reason: SDUPTHER

## 2021-04-12 ASSESSMENT — ENCOUNTER SYMPTOMS
COUGH: 0
CHEST TIGHTNESS: 0
RHINORRHEA: 0
CONSTIPATION: 0
SHORTNESS OF BREATH: 0
SORE THROAT: 0
ABDOMINAL PAIN: 0
DIARRHEA: 0
NAUSEA: 0
VOMITING: 0
EYE PAIN: 0
BLOOD IN STOOL: 0

## 2021-04-12 ASSESSMENT — PATIENT HEALTH QUESTIONNAIRE - PHQ9
SUM OF ALL RESPONSES TO PHQ QUESTIONS 1-9: 1
2. FEELING DOWN, DEPRESSED OR HOPELESS: 1

## 2021-04-12 NOTE — PROGRESS NOTES
[facility]) Physicians   Internal Medicine     2021  Glenna Lopez : 1965 Sex: female  Age:55 y.o. Chief Complaint   Patient presents with    Shoulder Pain     Right shoulder pain and clicking    Edema     ankles     Mass     Left axila lump     Alopecia     No improvement since last seen         HPI:   Patient presents to office for evaluation of the following  medical concerns. - States has swelling to axilla b/l. Nontender to palpation.     - States issues with anxiety. Improved but not controlled. On zoloft 100mg daily. States off lorazepam. Added hydroxyzine as needed (taking twice a day). States some side effects with medications, fatigue. Following with psychiatry and psychology. States feels overwhelmed at times. No suicidal thoughts.      - States has diabetes. States stopped metformin. States last A1c was 5.7 (2020). - States has high blood pressure. Occasionally checking blood pressure at home. States has been having dry cough. States also having alopecia. Uncertain if alopecia is lisinopril and or zoloft. - States has MICKI. States not currently wearing mask. Needs new sleep study. States sleeping has improved with medications. States has appointment with sleep center, cancelled. - States has GERD symptoms. States trying to watch diet. On tums otc      - has obesity.     - States has tremors. Was told essential tremors. Uncontrolled. - States has vitamin D def. On supplement.     - States having right shoulder pain. States does repetitive motion. No known truama or injury No swelling. No numbness. Never did home exercises.      Health Maintenance   - immunizations:   Influenza Vaccination - ()    Pneumonia Vaccination  Zoster/Shingles Vaccine  Tetanus Vaccination    - Screenings:   Bone Density Scan   Pelvic/Pap Exam  Mammogram - () - negative      Colonoscopy     ROS:  Review of Systems   Constitutional: Negative for appetite change, chills, fever and unexpected weight change. HENT: Negative for congestion, rhinorrhea and sore throat. Eyes: Negative for pain and visual disturbance. Respiratory: Negative for cough, chest tightness and shortness of breath. Cardiovascular: Negative for chest pain and palpitations. Gastrointestinal: Negative for abdominal pain, blood in stool, constipation, diarrhea, nausea and vomiting. Genitourinary: Negative for difficulty urinating, dysuria, frequency, pelvic pain, urgency and vaginal bleeding. Musculoskeletal: Negative for arthralgias and myalgias. Skin: Negative for rash. Neurological: Negative for dizziness, syncope, weakness, light-headedness, numbness and headaches. Hematological: Negative for adenopathy. Psychiatric/Behavioral: Negative for suicidal ideas. The patient is nervous/anxious.           Current Outpatient Medications:     losartan (COZAAR) 25 MG tablet, Take 1 tablet by mouth daily, Disp: 90 tablet, Rfl: 1    vitamin D (ERGOCALCIFEROL) 1.25 MG (05315 UT) CAPS capsule, Take 1 capsule by mouth once a week, Disp: 12 capsule, Rfl: 1    hydroCHLOROthiazide (MICROZIDE) 12.5 MG capsule, Take 1 capsule by mouth every morning, Disp: 30 capsule, Rfl: 5    sertraline (ZOLOFT) 100 MG tablet, Take 100 mg by mouth daily , Disp: , Rfl:     hydrOXYzine (VISTARIL) 25 MG capsule, Take 25 mg by mouth 3 times daily as needed for Anxiety , Disp: , Rfl:     cyclobenzaprine (FLEXERIL) 10 MG tablet, Take 1 tablet by mouth 3 times daily as needed for Muscle spasms, Disp: 60 tablet, Rfl: 2    Allergies   Allergen Reactions    Albuterol      Made her feel funny        Past Medical History:   Diagnosis Date    COVID-19 2020    Diabetes mellitus ([facility] [address]     Essential hypertension     Essential tremor     Generalized anxiety disorder 2020    Sleep apnea        Past Surgical History:   Procedure Laterality Date     SECTION      COLONOSCOPY      10 years ago    COLONOSCOPY N/A 3/29/2021    COLORECTAL CANCER SCREENING, NOT HIGH RISK performed by Btety Philippe MD at [address]        Family History   Problem Relation Age of Onset    Hypothyroidism Mother     Hypotension Mother     Cancer Father         gallbladder  52yr    Hypotension Brother     Heart Attack Other         Uncle on father's side  age 44       Social History     Socioeconomic History    Marital status:      Spouse name: None    Number of children: None    Years of education: None    Highest education level: None   Occupational History    None   Social Needs    Financial resource strain: None    Food insecurity     Worry: None     Inability: None    Transportation needs     Medical: None     Non-medical: None   Tobacco Use    Smoking status: Former Smoker     Packs/day: 0.75     Years: 20.00     Pack years: 15.00     Types: Cigarettes     Start date: 1998     Quit date: 3/4/2018     Years since quitting: 3.1    Smokeless tobacco: Never Used   Substance and Sexual Activity    Alcohol use: Yes     Frequency: 2-4 times a month    Drug use: Never    Sexual activity: None   Lifestyle    Physical activity     Days per week: None     Minutes per session: None    Stress: None   Relationships    Social connections     Talks on phone: None     Gets together: None     Attends Hindu service: None     Active member of club or organization: None     Attends meetings of clubs or organizations: None     Relationship status: None    Intimate partner violence     Fear of current or ex partner: None     Emotionally abused: None     Physically abused: None     Forced sexual activity: None   Other Topics Concern    None   Social History Narrative    None        Vitals:    21 1551   BP: 130/88   Site: Left Upper Arm   Position: Sitting   Cuff Size: Large Adult   Pulse: 78   Temp: 97.9 °F (36.6 °C)   SpO2: 98%   Weight: 229 lb (103.9 kg)   Height: 5' 6\" (1.676 m)       Exam:  Physical Exam  Vitals signs reviewed. Constitutional:       Appearance: She is well-developed. HENT:      Head: Normocephalic and atraumatic. Right Ear: External ear normal.      Left Ear: External ear normal.   Eyes:      Pupils: Pupils are equal, round, and reactive to light. Neck:      Musculoskeletal: Normal range of motion and neck supple. Thyroid: No thyromegaly. Cardiovascular:      Rate and Rhythm: Normal rate and regular rhythm. Heart sounds: Normal heart sounds. No murmur. Pulmonary:      Effort: Pulmonary effort is normal.      Breath sounds: Normal breath sounds. No wheezing. Abdominal:      General: Bowel sounds are normal. There is no distension. Palpations: Abdomen is soft. Tenderness: There is no abdominal tenderness. There is no guarding or rebound. Musculoskeletal: Normal range of motion. Lymphadenopathy:      Cervical: No cervical adenopathy. Skin:     General: Skin is warm and dry. Neurological:      Mental Status: She is alert and oriented to person, place, and time. Cranial Nerves: No cranial nerve deficit.    Psychiatric:         Judgment: Judgment normal.         Assessment and Plan:    Diagnoses and all orders for this visit:    Alopecia  - thyroid was normal   - check testosterone and DHEA  - referral to endocrinology     Axillary swelling  - states bilateral and non tender  - check US      Generalized anxiety disorder  - now following with psychiatry   - on zoloft 100mg per psychiatry   - added hydroxyzine prn   - no suicidal thoughts   - ER if worsening     Essential hypertension  - watch diet   - monitor blood pressure   - on lisinopril since DM diagnosis - states dry cough and poss alopecia   - stop lisinopril due to cough   - on losartan   - add hctz     Type 2 diabetes mellitus with hyperglycemia, without long-term current use of insulin (HCC)  - watch diet   - on metformin 500mg daily   - follow A1c - last was 5.7 (12/2020)   - will recommend to hold metformin   - follow off meds     Not on ACE  Not on statin   Not aspirin   Follows opth     Obstructive sleep apnea syndrome  - has not been wearing cpap   - will need new sleep study     Vitamin D deficiency  - on vit D [id number] weekly   - follow labs     Class 2 obesity due to excess calories without serious comorbidity with body mass index (BMI) of 35.0 to 35.9 in adult  - discussed diet and exercise   - has lost weight since last visit (9/2020)     Acute pain of right shoulder  - recommend exercises   - ice   - declines prednisone   - referral if not improved     Return in about 3 months (around 7/12/2021) for check up and review.     Orders Placed This Encounter   Procedures    US EXTREMITY LEFT NON VASC LIMITED     Standing Status:   Future     Standing Expiration Date:   4/12/2022     Order Specific Question:   Reason for exam:     Answer:   patient reports swelling - no masses appreciated on exam    US EXTREMITY RIGHT NON VASC LIMITED     Standing Status:   Future     Standing Expiration Date:   4/12/2022     Order Specific Question:   Reason for exam:     Answer:   patient reports swelling - no masses appreciated on exam    XR SHOULDER RIGHT (MIN 2 VIEWS)     Standing Status:   Future     Standing Expiration Date:   4/12/2022     Order Specific Question:   Reason for exam:     Answer:   pain, non truamatic    Comprehensive Metabolic Panel     Standing Status:   Future     Standing Expiration Date:   4/12/2022    Magnesium     Standing Status:   Future     Standing Expiration Date:   4/12/2022    Lipid, Fasting     Standing Status:   Future     Standing Expiration Date:   4/12/2022    Hemoglobin A1C     Standing Status:   Future     Standing Expiration Date:   4/12/2022    Vitamin D 25 Hydroxy     Standing Status:   Future     Standing Expiration Date:   4/12/2022    TSH without Reflex     Standing Status:   Future     Standing Expiration Date:   7/12/2021  Urinalysis     Standing Status:   Future     Standing Expiration Date:   4/12/2022    Microalbumin, Ur     Standing Status:   Future     Standing Expiration Date:   4/12/2022    CBC Auto Differential     Standing Status:   Future     Standing Expiration Date:   4/12/2022    TESTOSTERONE     Standing Status:   Future     Standing Expiration Date:   4/12/2022    DHEA-SULFATE     Standing Status:   Future     Standing Expiration Date:   4/12/2022    DHEA     Standing Status:   Future     Standing Expiration Date:   4/12/2022   Deion Adam DO, Endocrinology, [place] ([facility])     Referral Priority:   Routine     Referral Type:   Eval and Treat     Referral Reason:   Specialty Services Required     Referred to Provider:   Prince Barrientos DO     Requested Specialty:   Endocrinology     Number of Visits Requested:   1     Requested Prescriptions     Signed Prescriptions Disp Refills    losartan (COZAAR) 25 MG tablet 90 tablet 1     Sig: Take 1 tablet by mouth daily    vitamin D (ERGOCALCIFEROL) 1.25 MG (04098 UT) CAPS capsule 12 capsule 1     Sig: Take 1 capsule by mouth once a week    hydroCHLOROthiazide (MICROZIDE) 12.5 MG capsule 30 capsule 5     Sig: Take 1 capsule by mouth every morning        Nichole Burger MD  4/12/2021  4:34 PM

## 2021-04-13 ENCOUNTER — TELEPHONE (OUTPATIENT)
Dept: FAMILY MEDICINE CLINIC | Age: 56
End: 2021-04-13

## 2021-04-13 DIAGNOSIS — M25.511 ACUTE PAIN OF RIGHT SHOULDER: Primary | ICD-10-CM

## 2021-04-14 NOTE — TELEPHONE ENCOUNTER
Please let the patient know that x-ray of the shoulder per radiology report suggested    Shoulder joint appeared normal slight downsloping of the acromion process of the humerus and clavicle that is nonspecific no fractures no dislocations or other bony abnormalities    Blood work results    Testosterone level was low    DHEA still pending    Would recommend referral to endocrinology    Would consider physical therapy and/or orthopedics for the shoulder    Thanks

## 2021-04-14 NOTE — TELEPHONE ENCOUNTER
Orders Placed This Encounter   Procedures    External Referral To Physical Therapy     Referral Priority:   Routine     Referral Type:   Eval and Treat     Referral Reason:   Specialty Services Required     Requested Specialty:   Physical Therapy     Number of Visits Requested:   1     Referral placed   Thanks

## 2021-04-14 NOTE — TELEPHONE ENCOUNTER
Patient advised. She said that she would like to start physical therapy first. She would prefer to go to the one in [place]. First name Nazanin Kirby.  She does not have any other information

## 2021-04-16 ENCOUNTER — TELEPHONE (OUTPATIENT)
Dept: PRIMARY CARE CLINIC | Age: 56
End: 2021-04-16

## 2021-04-16 LAB
DHEA: 1.3 NG/ML (ref 0.63–4.7)
DHEAS (DHEA SULFATE): 113 UG/DL (ref 26–200)

## 2021-05-12 LAB
ALBUMIN SERPL-MCNC: 4.5 G/DL
ALP BLD-CCNC: 65 U/L
ALT SERPL-CCNC: 24 U/L
ANION GAP SERPL CALCULATED.3IONS-SCNC: NORMAL MMOL/L
AST SERPL-CCNC: 21 U/L
AVERAGE GLUCOSE: NORMAL
BILIRUB SERPL-MCNC: 0.5 MG/DL (ref 0.1–1.4)
BUN BLDV-MCNC: 21 MG/DL
CALCIUM SERPL-MCNC: 9.3 MG/DL
CHLORIDE BLD-SCNC: 99 MMOL/L
CO2: 32 MMOL/L
CORTISOL: 8.5
CREAT SERPL-MCNC: 0.76 MG/DL
GFR CALCULATED: 90
GLUCOSE BLD-MCNC: 128 MG/DL
HBA1C MFR BLD: 6.3 %
POTASSIUM SERPL-SCNC: 4 MMOL/L
SODIUM BLD-SCNC: 139 MMOL/L
TESTOSTERONE TOTAL: 18
TOTAL PROTEIN: 6.6

## 2021-05-18 ENCOUNTER — TELEPHONE (OUTPATIENT)
Dept: FAMILY MEDICINE CLINIC | Age: 56
End: 2021-05-18

## 2021-05-18 NOTE — TELEPHONE ENCOUNTER
Please reach out to the patient to see if she would like to complete ordered ultrasounds as scheduling has been unable to schedule the patient for the studies    If she declines please ask reason for declining and how symptoms are    Thanks

## 2021-05-19 NOTE — TELEPHONE ENCOUNTER
Notified patient. Patient states she does still want to have the ultrasound done. I transferred her to [facility]'s line to be scheduled.

## 2021-06-01 ENCOUNTER — TELEPHONE (OUTPATIENT)
Dept: FAMILY MEDICINE CLINIC | Age: 56
End: 2021-06-01

## 2021-06-01 NOTE — TELEPHONE ENCOUNTER
Please let the patient know that ultrasound of the right and left axilla per radiology report were considered normal.  No observed masses or fluid collections    Please keep us posted on symptoms and notify if any changes or other concerns    Thanks

## 2021-07-06 ENCOUNTER — OFFICE VISIT (OUTPATIENT)
Dept: FAMILY MEDICINE CLINIC | Age: 56
End: 2021-07-06
Payer: COMMERCIAL

## 2021-07-06 VITALS
DIASTOLIC BLOOD PRESSURE: 80 MMHG | OXYGEN SATURATION: 98 % | TEMPERATURE: 98 F | HEART RATE: 71 BPM | SYSTOLIC BLOOD PRESSURE: 134 MMHG | WEIGHT: 235 LBS | HEIGHT: 66 IN | BODY MASS INDEX: 37.77 KG/M2

## 2021-07-06 DIAGNOSIS — F41.1 GENERALIZED ANXIETY DISORDER: ICD-10-CM

## 2021-07-06 DIAGNOSIS — M25.559 HIP PAIN: ICD-10-CM

## 2021-07-06 DIAGNOSIS — I10 ESSENTIAL HYPERTENSION: ICD-10-CM

## 2021-07-06 DIAGNOSIS — E55.9 VITAMIN D DEFICIENCY: ICD-10-CM

## 2021-07-06 DIAGNOSIS — L65.9 ALOPECIA: ICD-10-CM

## 2021-07-06 DIAGNOSIS — G47.33 OBSTRUCTIVE SLEEP APNEA SYNDROME: ICD-10-CM

## 2021-07-06 DIAGNOSIS — K21.9 GASTROESOPHAGEAL REFLUX DISEASE WITHOUT ESOPHAGITIS: ICD-10-CM

## 2021-07-06 DIAGNOSIS — E11.65 TYPE 2 DIABETES MELLITUS WITH HYPERGLYCEMIA, WITHOUT LONG-TERM CURRENT USE OF INSULIN (HCC): ICD-10-CM

## 2021-07-06 DIAGNOSIS — E66.09 CLASS 2 OBESITY DUE TO EXCESS CALORIES WITHOUT SERIOUS COMORBIDITY WITH BODY MASS INDEX (BMI) OF 39.0 TO 39.9 IN ADULT: ICD-10-CM

## 2021-07-06 DIAGNOSIS — R42 DIZZINESS: Primary | ICD-10-CM

## 2021-07-06 DIAGNOSIS — M25.511 ACUTE PAIN OF RIGHT SHOULDER: ICD-10-CM

## 2021-07-06 PROCEDURE — 3044F HG A1C LEVEL LT 7.0%: CPT | Performed by: INTERNAL MEDICINE

## 2021-07-06 PROCEDURE — G8427 DOCREV CUR MEDS BY ELIG CLIN: HCPCS | Performed by: INTERNAL MEDICINE

## 2021-07-06 PROCEDURE — G8417 CALC BMI ABV UP PARAM F/U: HCPCS | Performed by: INTERNAL MEDICINE

## 2021-07-06 PROCEDURE — 1036F TOBACCO NON-USER: CPT | Performed by: INTERNAL MEDICINE

## 2021-07-06 PROCEDURE — 3017F COLORECTAL CA SCREEN DOC REV: CPT | Performed by: INTERNAL MEDICINE

## 2021-07-06 PROCEDURE — 99214 OFFICE O/P EST MOD 30 MIN: CPT | Performed by: INTERNAL MEDICINE

## 2021-07-06 PROCEDURE — 2022F DILAT RTA XM EVC RTNOPTHY: CPT | Performed by: INTERNAL MEDICINE

## 2021-07-06 ASSESSMENT — ENCOUNTER SYMPTOMS
EYE PAIN: 0
VOMITING: 0
COUGH: 0
RHINORRHEA: 0
ABDOMINAL PAIN: 0
SHORTNESS OF BREATH: 0
CHEST TIGHTNESS: 0
BLOOD IN STOOL: 0
SORE THROAT: 0
CONSTIPATION: 0
NAUSEA: 0
DIARRHEA: 0

## 2021-07-06 NOTE — PATIENT INSTRUCTIONS
please click on the \"Sign Up Now\" link. Current as of: August 4, 2020               Content Version: 12.9  © 2006-2021 [email or web address]. Care instructions adapted under license by [facility] [employer] [facility] ([facility]). If you have questions about a medical condition or this instruction, always ask your healthcare professional. [address] any warranty or liability for your use of this information. Patient Education        [place] Exercises for Vertigo: Care Instructions  Your Care Instructions  Simple exercises can help you regain your balance when you have vertigo. If you have Ménière's disease, benign paroxysmal positional vertigo (BPPV), or another inner ear problem, you may have vertigo off and on. Do these exercises first thing in the morning and before you go to bed. You might get dizzy when you first start them. If this happens, try to do them for at least 5 minutes. Do a group of exercises at a time, starting at the top of the list. It may take several weeks before you can do all the exercises without feeling dizzy. Follow-up care is a key part of your treatment and safety. Be sure to make and go to all appointments, and call your doctor if you are having problems. It's also a good idea to know your test results and keep a list of the medicines you take. How can you care for yourself at home? Exercise 1  While sitting on the side of the bed and holding your head still:  · Look up as far as you can. · Look down as far as you can. · Look from side to side as far as you can. · Stretch your arm straight out in front of you. Focus on your index finger. Continue to focus on your finger while you bring it to your nose. Exercise 2  While sitting on the side of the bed:  · Bring your head as far back as you can. · Bring your head forward to touch your chin to your chest.  · Turn your head from side to side. · Do these exercises first with your eyes open.  Then try with your eyes closed. Exercise 3  While sitting on the side of the bed:  · Shrug your shoulders straight upward, then relax them. · Bend over and try to touch the ground with your fingers. Then go back to a sitting position. · Toss a small ball from one hand to the other. Throw the ball higher than your eyes so you have to look up. Exercise 4  While standing (with someone close by if you feel uncomfortable):  · Repeat Exercise 1.  · Repeat Exercise 2.  · Pass a ball between your legs and above your head. · Sit down and then stand up. Repeat. Turn around in a [place] a different way each time you stand. · With someone close by to help you, try the above exercises with your eyes closed. Exercise 5  In a room that is cleared of obstacles:  · Walk to a corner of the room, turn to your right, and walk back to the starting point. Now, repeat and turn left. · Walk up and down a slope. Now try stairs. · While holding on to someone's arm, try these exercises with your eyes closed. When should you call for help? Watch closely for changes in your health, and be sure to contact your doctor if:    · You do not get better as expected. Where can you learn more? Go to https://[employer]. org and sign in to your [employer] account. Enter Y125 in the [facility] box to learn more about \"Caw[place]orne Exercises for Vertigo: Care Instructions. \"     If you do not have an account, please click on the \"Sign Up Now\" link. Current as of: December 2, 2020               Content Version: 12.9  © 7054-9657 [employer]. Care instructions adapted under license by [facility] ([facility]). If you have questions about a medical condition or this instruction, always ask your healthcare professional. [place] [address] any warranty or liability for your use of this information.

## 2021-07-06 NOTE — PROGRESS NOTES
[facility]   Internal Medicine     2021  Tone Esquivel : 1965 Sex: female  Age:55 y.o. Chief Complaint   Patient presents with    3 Month Follow-Up    Dizziness     Had an episode of dizziness when she was getting her eyebrows waxed. Afterwards the dizziness lasted several mins. Since then she still has ocassional dizziness (even when she turns her head a certain way).  Shoulder Pain     Right shoulder pain. Completed PT and is still doing home exercises. Feels like her shoulder is coming out of the socket. HPI:   Patient presents to office for evaluation of the following medical concerns. -  has been having dizziness. States had episode when getting up 3 weeks ago. . States was walking like drunk. States was off balance and waking into things. States has had on and off symptoms since then. No ear pain or pressure. No neck pain. - States having right shoulder pain. States does repetitive motion. No known truama or injury No swelling. No numbness. States did physical therapy and helped. - States has swelling to axilla b/l. Nontender to palpation.     - States issues with anxiety. Improved but not controlled. On zoloft 100mg daily. States off lorazepam. Added hydroxyzine as needed (taking twice a day). States some side effects with medications, fatigue. Following with psychiatry and psychology. States feels overwhelmed at times. No suicidal thoughts.      - States has diabetes. States stopped metformin. States last A1c was 5.7 (2020). - States has high blood pressure. Occasionally checking blood pressure at home. States has been having dry cough. States also having alopecia. Uncertain if alopecia is lisinopril and or zoloft. - States has MICKI. States not currently wearing mask. Needs new sleep study. States sleeping has improved with medications. States has appointment with sleep center, cancelled. - States has GERD symptoms.  States trying to watch diet. On tums otc      - has obesity.     - States has tremors. Was told essential tremors. Uncontrolled. - States has vitamin D def. On supplement. Health Maintenance   - immunizations:   Influenza Vaccination - (2020)    Pneumonia Vaccination  Zoster/Shingles Vaccine  Tetanus Vaccination    - Screenings:   Bone Density Scan   Pelvic/Pap Exam  Mammogram - (7/20) - negative      Colonoscopy (3/21) - normal colonoscopy, follow up in 5 yrs     ROS:  Review of Systems   Constitutional: Negative for appetite change, chills, fever and unexpected weight change. HENT: Negative for congestion, rhinorrhea and sore throat. Eyes: Negative for pain and visual disturbance. Respiratory: Negative for cough, chest tightness and shortness of breath. Cardiovascular: Negative for chest pain and palpitations. Gastrointestinal: Negative for abdominal pain, blood in stool, constipation, diarrhea, nausea and vomiting. Genitourinary: Negative for difficulty urinating, dysuria, frequency, pelvic pain, urgency and vaginal bleeding. Musculoskeletal: Negative for arthralgias and myalgias. Skin: Negative for rash. Neurological: Negative for dizziness, syncope, weakness, light-headedness, numbness and headaches. Hematological: Negative for adenopathy. Psychiatric/Behavioral: Negative for suicidal ideas. The patient is nervous/anxious.           Current Outpatient Medications:     Multiple Vitamin (MULTI-VITAMIN DAILY PO), Take by mouth, Disp: , Rfl:     losartan (COZAAR) 25 MG tablet, Take 1 tablet by mouth daily, Disp: 90 tablet, Rfl: 1    vitamin D (ERGOCALCIFEROL) 1.25 MG (92019 UT) CAPS capsule, Take 1 capsule by mouth once a week, Disp: 12 capsule, Rfl: 1    hydroCHLOROthiazide (MICROZIDE) 12.5 MG capsule, Take 1 capsule by mouth every morning, Disp: 30 capsule, Rfl: 5    sertraline (ZOLOFT) 100 MG tablet, Take 100 mg by mouth daily , Disp: , Rfl:     hydrOXYzine (VISTARIL) 25 MG capsule, Take 25 mg by mouth 3 times daily as needed for Anxiety , Disp: , Rfl:     cyclobenzaprine (FLEXERIL) 10 MG tablet, Take 1 tablet by mouth 3 times daily as needed for Muscle spasms, Disp: 60 tablet, Rfl: 2    Allergies   Allergen Reactions    Albuterol      Made her feel funny        Past Medical History:   Diagnosis Date    COVID-19 2020    Diabetes mellitus ([address]     Essential hypertension     Essential tremor     Generalized anxiety disorder 2020    Sleep apnea        Past Surgical History:   Procedure Laterality Date     SECTION      COLONOSCOPY      10 years ago    COLONOSCOPY N/A 3/29/2021    COLORECTAL CANCER SCREENING, NOT HIGH RISK performed by Ruby Bridges MD at [address] [facility] Right        Family History   Problem Relation Age of Onset    Hypothyroidism Mother     Hypotension Mother     Cancer Father         gallbladder  52yr    Hypotension Brother     Heart Attack Other         Uncle on father's side  age 44       Social History     Socioeconomic History    Marital status:      Spouse name: None    Number of children: None    Years of education: None    Highest education level: None   Occupational History    None   Tobacco Use    Smoking status: Former Smoker     Packs/day: 0.75     Years: 20.00     Pack years: 15.00     Types: Cigarettes     Start date: 1998     Quit date: 3/4/2018     Years since quitting: 3.3    Smokeless tobacco: Never Used   Substance and Sexual Activity    Alcohol use: Yes    Drug use: Never    Sexual activity: None   Other Topics Concern    None   Social History Narrative    None     Social Determinants of Health     Financial Resource Strain:     Difficulty of Paying Living Expenses:    Food Insecurity:     Worried About Running Out of Food in the Last Year:     [address] in the Last Year:    Transportation Needs:     Lack of Transportation (Medical):      Lack of Transportation (Non-Medical):    Physical Activity:     Days of Exercise per Week:     Minutes of Exercise per Session:    Stress:     Feeling of Stress :    Social Connections:     Frequency of Communication with Friends and Family:     Frequency of Social Gatherings with Friends and Family:     Attends Hindu Services:     Active Member of Clubs or Organizations:     Attends Club or Organization Meetings:     Marital Status:    Intimate Partner Violence:     Fear of Current or Ex-Partner:     Emotionally Abused:     Physically Abused:     Sexually Abused:         Vitals:    07/06/21 1608   BP: 134/80   Site: Left Upper Arm   Position: Sitting   Cuff Size: Large Adult   Pulse: 71   Temp: 98 °F (36.7 °C)   SpO2: 98%   Weight: 235 lb (106.6 kg)   Height: 5' 6\" (1.676 m)       Exam:  Physical Exam  Vitals reviewed. Constitutional:       Appearance: She is well-developed. HENT:      Head: Normocephalic and atraumatic. Right Ear: External ear normal.      Left Ear: External ear normal.   Eyes:      Pupils: Pupils are equal, round, and reactive to light. Neck:      Thyroid: No thyromegaly. Cardiovascular:      Rate and Rhythm: Normal rate and regular rhythm. Heart sounds: Normal heart sounds. No murmur heard. Pulmonary:      Effort: Pulmonary effort is normal.      Breath sounds: Normal breath sounds. No wheezing. Abdominal:      General: Bowel sounds are normal. There is no distension. Palpations: Abdomen is soft. Tenderness: There is no abdominal tenderness. There is no guarding or rebound. Musculoskeletal:         General: Normal range of motion. Cervical back: Normal range of motion and neck supple. Lymphadenopathy:      Cervical: No cervical adenopathy. Skin:     General: Skin is warm and dry. Neurological:      Mental Status: She is alert and oriented to person, place, and time. Cranial Nerves: No cranial nerve deficit.    Psychiatric: Judgment: Judgment normal.         Assessment and Plan:    Diagnoses and all orders for this visit:    Dizziness  - elements of vertigo or poss ortho stasis   - discussed medications   - referral to ENT    - home exercises     Alopecia  - thyroid was normal   - check testosterone and DHE  - has seen endocrinology   - per patient improved and stable   -  (5/21) -hirsutism -order labs, DHEA (dec), , DHEA-S (-), testosterone (-), 17 hydroxyprogesterone (-), cortisol (-)  androstenedione (-), cmp (-), gluc (incr)     Axillary swelling  - states bilateral and non tender  - US was nornmal     Generalized anxiety disorder  - now following with psychiatry   - on zoloft 100mg per psychiatry   - added hydroxyzine prn   - no suicidal thoughts   - ER if worsening     Essential hypertension  - watch diet   - monitor blood pressure   - on lisinopril since DM diagnosis - states dry cough and poss alopecia   - stop lisinopril due to cough   - on losartan   - on hctz   - stable     Type 2 diabetes mellitus with hyperglycemia, without long-term current use of insulin (HCC)  - watch diet   - on metformin 500mg daily   - follow A1c - last was 5.7 (12/2020)   - will recommend to hold metformin   - follow off meds     Not on ACE  Not on statin   Not aspirin   Follows opth     Obstructive sleep apnea syndrome  - has not been wearing cpap   - will need new sleep study     Vitamin D deficiency  - on vit D [id number] weekly   - follow labs     Class 2 obesity due to excess calories without serious comorbidity with body mass index (BMI) of 35.0 to 35.9 in adult  - discussed diet and exercise   - has lost weight since last visit (9/2020)     Acute pain of right shoulder  - recommend exercises   - ice   - had PT  - referral as not resolved     Hip pain - right   - previous injury and surgery   - asking for referral     Return in about 3 months (around 10/6/2021) for check up and review.     Orders Placed This Encounter   Procedures   Ivn[facility] Walker Jere Holley MD, Otolaryngology, [place] ([facility])     Referral Priority:   Routine     Referral Type:   Eval and Treat     Referral Reason:   Specialty Services Required     Referred to Provider:   Mely Stroud MD     Requested Specialty:   Otolaryngology     Number of Visits Requested:   [facility] Gloria Mao MD, Orthopaedics, [place]     Referral Priority:   Routine     Referral Type:   Eval and Treat     Referral Reason:   Specialty Services Required     Referred to Provider:   Suly Norman MD     Requested Specialty:   Orthopedic Surgery     Number of Visits Requested:   1     Requested Prescriptions      No prescriptions requested or ordered in this encounter        Sonido Muñoz MD  7/6/2021  4:44 PM

## 2021-08-04 ENCOUNTER — OFFICE VISIT (OUTPATIENT)
Dept: ORTHOPEDIC SURGERY | Age: 56
End: 2021-08-04
Payer: COMMERCIAL

## 2021-08-04 DIAGNOSIS — M25.511 ACUTE PAIN OF RIGHT SHOULDER: Primary | ICD-10-CM

## 2021-08-04 DIAGNOSIS — M25.551 RIGHT HIP PAIN: ICD-10-CM

## 2021-08-04 PROCEDURE — G8417 CALC BMI ABV UP PARAM F/U: HCPCS | Performed by: ORTHOPAEDIC SURGERY

## 2021-08-04 PROCEDURE — 99204 OFFICE O/P NEW MOD 45 MIN: CPT | Performed by: ORTHOPAEDIC SURGERY

## 2021-08-04 PROCEDURE — G8427 DOCREV CUR MEDS BY ELIG CLIN: HCPCS | Performed by: ORTHOPAEDIC SURGERY

## 2021-08-04 PROCEDURE — 1036F TOBACCO NON-USER: CPT | Performed by: ORTHOPAEDIC SURGERY

## 2021-08-04 PROCEDURE — 3017F COLORECTAL CA SCREEN DOC REV: CPT | Performed by: ORTHOPAEDIC SURGERY
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
## 2021-08-05 ENCOUNTER — OFFICE VISIT (OUTPATIENT)
Dept: FAMILY MEDICINE CLINIC | Age: 56
End: 2021-08-05
Payer: COMMERCIAL

## 2021-08-05 VITALS
TEMPERATURE: 98.1 F | SYSTOLIC BLOOD PRESSURE: 140 MMHG | HEIGHT: 66 IN | OXYGEN SATURATION: 98 % | WEIGHT: 235 LBS | DIASTOLIC BLOOD PRESSURE: 86 MMHG | HEART RATE: 71 BPM | BODY MASS INDEX: 37.77 KG/M2

## 2021-08-05 DIAGNOSIS — M79.89 SWELLING OF BOTH HANDS: ICD-10-CM

## 2021-08-05 DIAGNOSIS — M25.50 POLYARTHRALGIA: ICD-10-CM

## 2021-08-05 DIAGNOSIS — T78.40XA ALLERGIC REACTION, INITIAL ENCOUNTER: Primary | ICD-10-CM

## 2021-08-05 PROCEDURE — G8417 CALC BMI ABV UP PARAM F/U: HCPCS | Performed by: INTERNAL MEDICINE

## 2021-08-05 PROCEDURE — 99213 OFFICE O/P EST LOW 20 MIN: CPT | Performed by: INTERNAL MEDICINE

## 2021-08-05 PROCEDURE — 1036F TOBACCO NON-USER: CPT | Performed by: INTERNAL MEDICINE

## 2021-08-05 PROCEDURE — 3017F COLORECTAL CA SCREEN DOC REV: CPT | Performed by: INTERNAL MEDICINE

## 2021-08-05 PROCEDURE — G8427 DOCREV CUR MEDS BY ELIG CLIN: HCPCS | Performed by: INTERNAL MEDICINE

## 2021-08-05 PROCEDURE — 96372 THER/PROPH/DIAG INJ SC/IM: CPT | Performed by: INTERNAL MEDICINE

## 2021-08-05 RX ORDER — BUPROPION HYDROCHLORIDE 100 MG/1
TABLET, EXTENDED RELEASE ORAL
COMMUNITY
Start: 2021-07-29 | End: 2021-08-06

## 2021-08-05 RX ORDER — PREDNISONE 10 MG/1
TABLET ORAL
Qty: 18 TABLET | Refills: 0 | Status: SHIPPED | OUTPATIENT
Start: 2021-08-05 | End: 2021-08-14

## 2021-08-05 RX ORDER — KETOROLAC TROMETHAMINE 30 MG/ML
30 INJECTION, SOLUTION INTRAMUSCULAR; INTRAVENOUS ONCE
Status: COMPLETED | OUTPATIENT
Start: 2021-08-05 | End: 2021-08-05

## 2021-08-05 RX ADMIN — KETOROLAC TROMETHAMINE 30 MG: 30 INJECTION, SOLUTION INTRAMUSCULAR; INTRAVENOUS at 14:48

## 2021-08-05 NOTE — LETTER
[place] [address]  Phone: [phone]  Fax: [phone]    Heather Triplett MD        August 5, 2021     Patient: Reid Gutierrez   YOB: 1965   Date of Visit: 8/5/2021       To Whom It May Concern:    Ms. Shweta Lutz was seen today in [facility] for medical issue which will preclude her from work until cleared by her primary care doctor. She will be seen by him next week to reevaluate.     Sincerely,        Heather Triplett MD

## 2021-08-06 ENCOUNTER — TELEPHONE (OUTPATIENT)
Dept: FAMILY MEDICINE CLINIC | Age: 56
End: 2021-08-06

## 2021-08-06 ASSESSMENT — ENCOUNTER SYMPTOMS
CHEST TIGHTNESS: 0
STRIDOR: 0
SHORTNESS OF BREATH: 0
NAUSEA: 0
VOMITING: 0
WHEEZING: 0
ABDOMINAL PAIN: 0

## 2021-08-06 NOTE — PROGRESS NOTES
[facility]     21  Tanja Cuellar : 1965 Sex: female  Age: 50 y.o. Chief Complaint   Patient presents with    Allergic Reaction     was recently prescribed wellbutrin and her hands got swollen, arms hurt, neck hurts    Other     all symptoms started when she was started on wellbutrin; hands/arms swollen, cant make a fist       HPI    Patient of Dr. Joce Davenport presents to express care accompanied by her mother complaining of allergic reaction to Wellbutrin that she was started on last week. This was started by psychiatry. She called them about reaction she was having with hand swelling and upper extremity joint pain neck discomfort and they changed her from the extended release Wellbutrin to the immediate release. Continued. She called back and they had her stop the medication presents here today. States she cannot make a fist with her hands because of the swelling and discomfort. She has no lower extremity joint pain. States her neck bothers her both shoulders arms wrist and hands. She denies any shortness of breath lip tongue or throat swelling. No chest pain. Has not had a reaction like this in the past.  Does admit to chronic right shoulder pain which is not associated with any of this. She follows with orthopedics for that. Review of Systems   Constitutional: Negative for chills and fever. Respiratory: Negative for chest tightness, shortness of breath, wheezing and stridor. Gastrointestinal: Negative for abdominal pain, nausea and vomiting. Musculoskeletal: Positive for arthralgias and neck pain.        -See above            REST OF PERTINENT ROS GONE OVER AND WAS NEGATIVE. Current Outpatient Medications:     predniSONE (DELTASONE) 10 MG tablet, Take 3 tablets by mouth daily for 3 days, THEN 2 tablets daily for 3 days, THEN 1 tablet daily for 3 days. , Disp: 18 tablet, Rfl: 0    losartan (COZAAR) 25 MG tablet, Take 1 tablet by mouth daily, Disp: 90 tablet, Rfl: 1    vitamin D (ERGOCALCIFEROL) 1.25 MG (07670 UT) CAPS capsule, Take 1 capsule by mouth once a week, Disp: 12 capsule, Rfl: 1    hydroCHLOROthiazide (MICROZIDE) 12.5 MG capsule, Take 1 capsule by mouth every morning, Disp: 30 capsule, Rfl: 5    sertraline (ZOLOFT) 100 MG tablet, Take 100 mg by mouth daily , Disp: , Rfl:     hydrOXYzine (VISTARIL) 25 MG capsule, Take 25 mg by mouth 3 times daily as needed for Anxiety , Disp: , Rfl:     cyclobenzaprine (FLEXERIL) 10 MG tablet, Take 1 tablet by mouth 3 times daily as needed for Muscle spasms, Disp: 60 tablet, Rfl: 2    buPROPion (WELLBUTRIN SR) 100 MG extended release tablet, take 1 tablet by mouth every morning before NOON (Patient not taking: Reported on 2021), Disp: , Rfl:   Allergies   Allergen Reactions    Albuterol      Made her feel funny        Past Medical History:   Diagnosis Date    COVID-19 2020    [address] [facility] [address]     Essential hypertension     Essential tremor     Generalized anxiety disorder 2020    Sleep apnea      Past Surgical History:   Procedure Laterality Date     SECTION      COLONOSCOPY      10 years ago    COLONOSCOPY N/A 3/29/2021    COLORECTAL CANCER SCREENING, NOT HIGH RISK performed by Noe Curtis MD at [address] [place] [address]      Family History   Problem Relation Age of Onset    Hypothyroidism Mother     Hypotension Mother     Cancer Father         gallbladder  52yr    Hypotension Brother     Heart Attack Other         Uncle on father's side  age 44     Social History     Socioeconomic History    Marital status:      Spouse name: Not on file    Number of children: Not on file    Years of education: Not on file    Highest education level: Not on file   Occupational History    Not on file   Tobacco Use    Smoking status: Former Smoker     Packs/day: 0.75     Years: 20.00     Pack years: 15.00     Types: Cigarettes     Start date: Normal rate and regular rhythm. Heart sounds: No murmur heard. Pulmonary:      Effort: Pulmonary effort is normal. No respiratory distress. Breath sounds: No wheezing, rhonchi or rales. Abdominal:      General: Bowel sounds are normal.      Palpations: Abdomen is soft. Tenderness: There is no abdominal tenderness. Musculoskeletal:         General: Swelling and tenderness present. No deformity or signs of injury. Cervical back: Normal range of motion and neck supple. Tenderness present. Comments: Examination of the hands: She does have minimal puffiness to the hands difficulty in closing making a fist because of swelling and pain. Does have some mild tenderness over the MCP PIP and DIP joints. Limited range of motion to raising her arms above 90 degrees secondary to pain. No tenderness to palpation over the shoulder or arms. Lymphadenopathy:      Cervical: No cervical adenopathy. Skin:     General: Skin is warm and dry. Findings: No bruising, erythema or rash. Neurological:      General: No focal deficit present. Mental Status: She is alert and oriented to person, place, and time. Psychiatric:         Mood and Affect: Mood normal.         Behavior: Behavior normal.         Thought Content: Thought content normal.         Judgment: Judgment normal.               Assessment and Plan:  Rina BAILEY was seen today for allergic reaction and other. Diagnoses and all orders for this visit:    Allergic reaction, initial encounter  -     Comprehensive Metabolic Panel; Future  -     CBC Auto Differential; Future  -     CK; Future  -     SEDIMENTATION RATE; Future  -     C-REACTIVE PROTEIN; Future  -     JESUSITA; Future  -     RHEUMATOID FACTOR; Future    Swelling of both hands  -     Comprehensive Metabolic Panel; Future  -     CBC Auto Differential; Future  -     CK; Future  -     SEDIMENTATION RATE; Future  -     C-REACTIVE PROTEIN; Future  -     JESUSITA;  Future  -     RHEUMATOID FACTOR; Future    Polyarthralgia  -     Comprehensive Metabolic Panel; Future  -     CBC Auto Differential; Future  -     CK; Future  -     SEDIMENTATION RATE; Future  -     C-REACTIVE PROTEIN; Future  -     JESUSITA; Future  -     RHEUMATOID FACTOR; Future    Other orders  -     ketorolac (TORADOL) injection 30 mg  -     predniSONE (DELTASONE) 10 MG tablet; Take 3 tablets by mouth daily for 3 days, THEN 2 tablets daily for 3 days, THEN 1 tablet daily for 3 days. Plan: This is somewhat of an unusual presentation affecting only the upper extremities. I did have her stay off the Wellbutrin however shot of Toradol. Prednisone taper dose. I did order some blood work. She is to follow-up with her PCP next week. Letter for employer. Notify us of problems in the interim. Return in about 1 week (around 8/12/2021). Seen By:  Josh Marlow MD      *Document was created using voice recognition software. Note was reviewed however may contain grammatical errors.

## 2021-08-11 ENCOUNTER — OFFICE VISIT (OUTPATIENT)
Dept: FAMILY MEDICINE CLINIC | Age: 56
End: 2021-08-11
Payer: COMMERCIAL

## 2021-08-11 VITALS
OXYGEN SATURATION: 96 % | DIASTOLIC BLOOD PRESSURE: 80 MMHG | TEMPERATURE: 97.2 F | HEIGHT: 66 IN | HEART RATE: 74 BPM | WEIGHT: 235 LBS | BODY MASS INDEX: 37.77 KG/M2 | SYSTOLIC BLOOD PRESSURE: 120 MMHG

## 2021-08-11 DIAGNOSIS — M79.89 SWELLING OF BOTH HANDS: Primary | ICD-10-CM

## 2021-08-11 DIAGNOSIS — M25.50 POLYARTHRALGIA: ICD-10-CM

## 2021-08-11 PROCEDURE — 1036F TOBACCO NON-USER: CPT | Performed by: INTERNAL MEDICINE

## 2021-08-11 PROCEDURE — 99214 OFFICE O/P EST MOD 30 MIN: CPT | Performed by: INTERNAL MEDICINE

## 2021-08-11 PROCEDURE — 3017F COLORECTAL CA SCREEN DOC REV: CPT | Performed by: INTERNAL MEDICINE

## 2021-08-11 PROCEDURE — G8427 DOCREV CUR MEDS BY ELIG CLIN: HCPCS | Performed by: INTERNAL MEDICINE

## 2021-08-11 PROCEDURE — G8417 CALC BMI ABV UP PARAM F/U: HCPCS | Performed by: INTERNAL MEDICINE

## 2021-08-11 ASSESSMENT — ENCOUNTER SYMPTOMS
CHEST TIGHTNESS: 0
ABDOMINAL PAIN: 0
EYE PAIN: 0
RHINORRHEA: 0
CONSTIPATION: 0
COUGH: 0
NAUSEA: 0
VOMITING: 0
SORE THROAT: 0
SHORTNESS OF BREATH: 0
DIARRHEA: 0
BLOOD IN STOOL: 0

## 2021-08-11 NOTE — PROGRESS NOTES
[facility]) Physicians   Internal Medicine     2021  Tressa Carey : 1965 Sex: female  Age:55 y.o. Chief Complaint   Patient presents with    Discuss Medications     Pt was seen on 21 by Dr Janina Kim. Concerns about side effects of Wellbutrin - swelling in hands, pain in arms and necl. Given IM Toradol and oral Prednisone    Discuss Labs     Labs ordered through [facility] by Dr Janina Kim         HPI:   Patient presents to office for evaluation of the following medical concerns. -  was seen in urgent care. States was having symptoms - muscle and joint. States swelling, decreased ROM. States was fe;t having reaction to new Wellbutrin XR, called psychiatry and placed on sort activing. Continued to call off work due to pain , swelling and decreased ROm of arms and neck. States came to urgent care (2021) stopped med, toradol x 1, prednisone, labs RF,JESUSITA, ESR, CK, CBC, CMP - neg, CRP incr, gluc incr. States still with pain to shoulders and wrists. -  has been having dizziness. States had episode when getting up 3 weeks ago. . States was walking like drunk. States was off balance and waking into things. States has had on and off symptoms since then. No ear pain or pressure. No neck pain. - States having right shoulder pain. States does repetitive motion. No known truama or injury No swelling. No numbness. States did physical therapy and helped. - States has swelling to axilla b/l. Nontender to palpation.     - States issues with anxiety. Improved but not controlled. On zoloft 100mg daily. States off lorazepam. Added hydroxyzine as needed (taking twice a day). States some side effects with medications, fatigue. Following with psychiatry and psychology. States feels overwhelmed at times. No suicidal thoughts.      - States has diabetes. States stopped metformin. States last A1c was 5.7 (2020). - States has high blood pressure.  Occasionally checking blood pressure at home. [facility] has been having dry cough. States also having alopecia. Uncertain if alopecia is lisinopril and or zoloft. - States has MICKI. States not currently wearing mask. Needs new sleep study. States sleeping has improved with medications. States has appointment with sleep center, cancelled. - States has GERD symptoms. States trying to watch diet. On tums otc      - has obesity.     - States has tremors. Was told essential tremors. Uncontrolled. - States has vitamin D def. On supplement. Health Maintenance   - immunizations:   Influenza Vaccination - (2020)    Pneumonia Vaccination  Zoster/Shingles Vaccine  Tetanus Vaccination    - Screenings:   Bone Density Scan   Pelvic/Pap Exam  Mammogram - (7/20) - negative      Colonoscopy (3/21) - normal colonoscopy, follow up in 5 yrs     ROS:  Review of Systems   Constitutional: Negative for appetite change, chills, fever and unexpected weight change. HENT: Negative for congestion, rhinorrhea and sore throat. Eyes: Negative for pain and visual disturbance. Respiratory: Negative for cough, chest tightness and shortness of breath. Cardiovascular: Negative for chest pain and palpitations. Gastrointestinal: Negative for abdominal pain, blood in stool, constipation, diarrhea, nausea and vomiting. Genitourinary: Negative for difficulty urinating, dysuria, frequency, pelvic pain, urgency and vaginal bleeding. Musculoskeletal: Negative for arthralgias and myalgias. Skin: Negative for rash. Neurological: Negative for dizziness, syncope, weakness, light-headedness, numbness and headaches. Hematological: Negative for adenopathy. Psychiatric/Behavioral: Negative for suicidal ideas. The patient is nervous/anxious. Current Outpatient Medications:     predniSONE (DELTASONE) 10 MG tablet, Take 3 tablets by mouth daily for 3 days, THEN 2 tablets daily for 3 days, THEN 1 tablet daily for 3 days. , Disp: 18 tablet, Rfl: 0    losartan (COZAAR) 25 MG tablet, Take 1 tablet by mouth daily, Disp: 90 tablet, Rfl: 1    vitamin D (ERGOCALCIFEROL) 1.25 MG (14286 UT) CAPS capsule, Take 1 capsule by mouth once a week, Disp: 12 capsule, Rfl: 1    hydroCHLOROthiazide (MICROZIDE) 12.5 MG capsule, Take 1 capsule by mouth every morning, Disp: 30 capsule, Rfl: 5    sertraline (ZOLOFT) 100 MG tablet, Take 100 mg by mouth daily , Disp: , Rfl:     hydrOXYzine (VISTARIL) 25 MG capsule, Take 25 mg by mouth 3 times daily as needed for Anxiety , Disp: , Rfl:     cyclobenzaprine (FLEXERIL) 10 MG tablet, Take 1 tablet by mouth 3 times daily as needed for Muscle spasms, Disp: 60 tablet, Rfl: 2    Allergies   Allergen Reactions    Albuterol      Made her feel funny     Wellbutrin [Bupropion] Swelling and Other (See Comments)     Upper extremity arthralgias       Past Medical History:   Diagnosis Date    COVID-19 2020    Diabetes mellitus ([facility] [address]     Essential hypertension     Essential tremor     Generalized anxiety disorder 2020    Sleep apnea        Past Surgical History:   Procedure Laterality Date     SECTION      COLONOSCOPY      10 years ago    COLONOSCOPY N/A 3/29/2021    COLORECTAL CANCER SCREENING, NOT HIGH RISK performed by Nohemy Marroquin MD at [address] [place] [address]        Family History   Problem Relation Age of Onset    Hypothyroidism Mother     Hypotension Mother     Cancer Father         gallbladder  52yr    Hypotension Brother     Heart Attack Other         Uncle on father's side  age 44       Social History     Socioeconomic History    Marital status:      Spouse name: None    Number of children: None    Years of education: None    Highest education level: None   Occupational History    None   Tobacco Use    Smoking status: Former Smoker     Packs/day: 0.75     Years: 20.00     Pack years: 15.00     Types: Cigarettes     Start date: 1998     Quit date: 3/4/2018 Years since quitting: 3.4    Smokeless tobacco: Never Used   Substance and Sexual Activity    Alcohol use: Yes    Drug use: Never    Sexual activity: None   Other Topics Concern    None   Social History Narrative    None     Social Determinants of Health     Financial Resource Strain:     Difficulty of Paying Living Expenses:    Food Insecurity:     Worried About Running Out of Food in the Last Year:     [address] in the Last Year:    Transportation Needs:     Lack of Transportation (Medical):  Lack of Transportation (Non-Medical):    Physical Activity:     Days of Exercise per Week:     Minutes of Exercise per Session:    Stress:     Feeling of Stress :    Social Connections:     Frequency of Communication with Friends and Family:     Frequency of Social Gatherings with Friends and Family:     Attends Confucianism Services:     Active Member of Clubs or Organizations:     Attends Club or Organization Meetings:     Marital Status:    Intimate Partner Violence:     Fear of Current or Ex-Partner:     Emotionally Abused:     Physically Abused:     Sexually Abused:         Vitals:    08/11/21 1613   BP: 120/80   Site: Left Upper Arm   Position: Sitting   Cuff Size: Large Adult   Pulse: 74   Temp: 97.2 °F (36.2 °C)   SpO2: 96%   Weight: 235 lb (106.6 kg)   Height: 5' 6\" (1.676 m)       Exam:  Physical Exam  Vitals reviewed. Constitutional:       Appearance: She is well-developed. HENT:      Head: Normocephalic and atraumatic. Right Ear: External ear normal.      Left Ear: External ear normal.   Eyes:      Pupils: Pupils are equal, round, and reactive to light. Neck:      Thyroid: No thyromegaly. Cardiovascular:      Rate and Rhythm: Normal rate and regular rhythm. Heart sounds: Normal heart sounds. No murmur heard. Pulmonary:      Effort: Pulmonary effort is normal.      Breath sounds: Normal breath sounds. No wheezing.    Abdominal:      General: Bowel sounds are normal. There is no distension. Palpations: Abdomen is soft. Tenderness: There is no abdominal tenderness. There is no guarding or rebound. Musculoskeletal:         General: Normal range of motion. Cervical back: Normal range of motion and neck supple. Lymphadenopathy:      Cervical: No cervical adenopathy. Skin:     General: Skin is warm and dry. Neurological:      Mental Status: She is alert and oriented to person, place, and time. Cranial Nerves: No cranial nerve deficit. Psychiatric:         Judgment: Judgment normal.         Assessment and Plan:    Diagnoses and all orders for this visit:    Swelling of both hands  - xray hands and wrists   - EMG to r/o carpal tunnel   - FMLA forms completed with patient during office (8/11/2021)     Polyarthralgia  - uncertain etiology   - labs ESR, JESUSITA, RF, were negative CRP min elevated   - given toradol x1   - given prednisone taper   - started on Wellbutrin and feels may have precipitated issues.    - complete prednisone   - stay off Wellbutrin     Dizziness  - elements of vertigo or poss ortho stasis   - discussed medications   - referral to ENT    - home exercises     Alopecia  - thyroid was normal   - check testosterone and DHE  - has seen endocrinology   - per patient improved and stable   -  (5/21) -hirsutism -order labs, DHEA (dec), , DHEA-S (-), testosterone (-), 17 hydroxyprogesterone (-), cortisol (-)  androstenedione (-), cmp (-), gluc (incr)     Axillary swelling  - states bilateral and non tender  - US was nornmal     Generalized anxiety disorder  - now following with psychiatry   - on zoloft 100mg per psychiatry   - added hydroxyzine prn   - added Wellbutrin - had reaction - stopped (8/2021)   - no suicidal thoughts   - ER if worsening     Essential hypertension  - watch diet   - monitor blood pressure   - on lisinopril since DM diagnosis - states dry cough and poss alopecia   - stop lisinopril due to cough   - on losartan   - on hctz   - stable     Type 2 diabetes mellitus with hyperglycemia, without long-term current use of insulin (HCC)  - watch diet   - on metformin 500mg daily   - follow A1c - last was 5.7 (12/2020)   - will recommend to hold metformin   - follow off meds     Not on ACE  Not on statin   Not aspirin   Follows opth     Obstructive sleep apnea syndrome  - has not been wearing cpap   - will need new sleep study     Vitamin D deficiency  - on vit D [id number] weekly   - follow labs     Class 2 obesity due to excess calories without serious comorbidity with body mass index (BMI) of 35.0 to 35.9 in adult  - discussed diet and exercise   - has lost weight since last visit (9/2020)     Acute pain of right shoulder  - recommend exercises   - ice   - had PT  - referral as not resolved     Hip pain - right   - previous injury and surgery   - asking for referral     Return for check up and review, as scheduled. Orders Placed This Encounter   Procedures    XR HAND LEFT (MIN 3 VIEWS)     Standing Status:   Future     Standing Expiration Date:   8/11/2022     Order Specific Question:   Reason for exam:     Answer:   pain in hands b/l    XR HAND RIGHT (MIN 3 VIEWS)     Standing Status:   Future     Standing Expiration Date:   8/11/2022     Order Specific Question:   Reason for exam:     Answer:   hand and wrist pain    XR WRIST LEFT (MIN 3 VIEWS)     Standing Status:   Future     Standing Expiration Date:   8/11/2022     Order Specific Question:   Reason for exam:     Answer:   hand pain and wrist pain b/l    XR WRIST RIGHT (MIN 3 VIEWS)     Standing Status:   Future     Standing Expiration Date:   8/11/2022     Order Specific Question:   Reason for exam:     Answer:   hand and wrist pain    EMG     Standing Status:   Future     Standing Expiration Date:   10/10/2021     Order Specific Question:   Which body part?      Answer:   upper extemity b/l     Requested Prescriptions      No prescriptions requested or ordered in this

## 2021-08-12 ENCOUNTER — TELEPHONE (OUTPATIENT)
Dept: FAMILY MEDICINE CLINIC | Age: 56
End: 2021-08-12

## 2021-08-12 NOTE — TELEPHONE ENCOUNTER
I called pt to review Xray results but her voicemail was full. I will mail a copy of the reports along w/ a letter.

## 2021-08-12 NOTE — LETTER
[address] [place] [address] Drive  [address] [place] [address]  Phone: [phone]  Fax: [phone]    Vini Proctor MD    August 12, 2021     [address] [facility] [address]      Dear Sammie Chiang C:    Below are the results from your recent visit:    Resulted Orders   XR WRIST RIGHT (MIN 3 VIEWS)    Narrative    EXAMINATION:  THREE XRAY VIEWS OF THE LEFT HAND; 3 XRAY VIEWS OF THE LEFT WRIST; 3 XRAY  VIEWS OF THE RIGHT WRIST; THREE XRAY VIEWS OF THE RIGHT HAND    8/11/2021 3:50 pm    COMPARISON:  None. HISTORY:  ORDERING SYSTEM PROVIDED HISTORY: Swelling of both hands  TECHNOLOGIST PROVIDED HISTORY:  Reason for exam:->pain in hands b/l; ORDERING SYSTEM PROVIDED HISTORY:  Swelling of both hands  TECHNOLOGIST PROVIDED HISTORY:  Reason for exam:->hand pain and wrist pain b/l; ORDERING SYSTEM PROVIDED  HISTORY: Swelling of both hands  TECHNOLOGIST PROVIDED HISTORY:  Reason for exam:->hand and wrist pain    FINDINGS:  Left wrist and hand: No fracture or dislocation. Mild osteoarthritis at the  base of the thumb. Normal soft tissues. Right wrist and hand: No acute fracture or dislocation. Mild osteoarthritis  at the base of the thumb. Normal soft tissues. 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 Nothing else active. The test Xray results show:    X-rays of the left wrist and hand were negative for fracture, showing only mild osteoarthritis of the thumb     X-rays of the right hand and wrist showed no fracture and only mild osteoarthritis of the thumb      If you have any questions or concerns, please don't hesitate to call.     Sincerely,        Vini Proctor MD

## 2021-08-12 NOTE — TELEPHONE ENCOUNTER
Please let the patient know that x-ray per radiology report suggested    X-rays of the left wrist and hand were negative for fracture, showing only mild osteoarthritis of the thumb    X-rays of the right hand and wrist showed no fracture and only mild osteoarthritis of the thumb    Please send a copy of the reports to the patient    Thanks

## 2021-08-17 ENCOUNTER — OFFICE VISIT (OUTPATIENT)
Dept: NEUROLOGY | Age: 56
End: 2021-08-17
Payer: COMMERCIAL

## 2021-08-17 ENCOUNTER — TELEPHONE (OUTPATIENT)
Dept: FAMILY MEDICINE CLINIC | Age: 56
End: 2021-08-17

## 2021-08-17 VITALS
WEIGHT: 235 LBS | DIASTOLIC BLOOD PRESSURE: 90 MMHG | HEIGHT: 66 IN | SYSTOLIC BLOOD PRESSURE: 130 MMHG | BODY MASS INDEX: 37.77 KG/M2

## 2021-08-17 DIAGNOSIS — M79.89 SWELLING OF BOTH HANDS: ICD-10-CM

## 2021-08-17 DIAGNOSIS — G56.03 BILATERAL CARPAL TUNNEL SYNDROME: Primary | ICD-10-CM

## 2021-08-17 DIAGNOSIS — M79.641 BILATERAL HAND PAIN: Primary | ICD-10-CM

## 2021-08-17 DIAGNOSIS — M79.642 BILATERAL HAND PAIN: Primary | ICD-10-CM

## 2021-08-17 DIAGNOSIS — M25.50 POLYARTHRALGIA: ICD-10-CM

## 2021-08-17 DIAGNOSIS — E11.65 TYPE 2 DIABETES MELLITUS WITH HYPERGLYCEMIA, WITHOUT LONG-TERM CURRENT USE OF INSULIN (HCC): ICD-10-CM

## 2021-08-17 DIAGNOSIS — G56.03 BILATERAL CARPAL TUNNEL SYNDROME: Chronic | ICD-10-CM

## 2021-08-17 PROCEDURE — 95885 MUSC TST DONE W/NERV TST LIM: CPT | Performed by: PSYCHIATRY & NEUROLOGY

## 2021-08-17 PROCEDURE — 95910 NRV CNDJ TEST 7-8 STUDIES: CPT | Performed by: PSYCHIATRY & NEUROLOGY

## 2021-08-17 NOTE — PROGRESS NOTES
[address] [facility]  Electrodiagnostic Laboratory  *Accredited by the [facility] with exemplary status  [address] [facility]  Phone: [phone]  Fax: [phone]    Referring Provider: Juan David Fine MD  Primary Care Physician: Vini Proctor MD  Patient Name: Gera Goldstein  Patient YOB: 1965  Gender: female  BMI: Body mass index is 37.93 kg/m². Blood pressure (!) 130/90, height 5' 6\" (1.676 m), weight 235 lb (106.6 kg), not currently breastfeeding. 8/17/2021    Description of clinical problem: Swelling of both hands, polyarthralgia. History of right shoulder pain. Mild osteoarthritis, base of both thumbs. History glucose impairment, vitamin D deficiency. Chief Complaint   Patient presents with    Procedure     EMG PEDRO PABLO UE     Pain Yes   ; Numbness/tingling  No; Weakness  No-guarding due to pain. Brief physical exam:   Sensory deficit No; Weakness No; Atrophy  No; Reflex abnormality No  Limited neurologic exam performed of the right and left upper extremity shows grossly intact power in the upper extremities, somewhat limited by guarding due to pain complaints of the hands and arms. Motor tone is normal.  There are no fasciculations or focal muscular atrophy. DTRs: 1+ and symmetric. Negative Tinel's test to percussion over both wrists. Sensory exam: Hyperpathia, diffusely in upper extremities. No sensory deficits reported. Study Limitations:  Pain, fear of needles, intermittent tremors and increased by anxiety. Full Name: Jonas Lefort Gender: Female  MRN: [id number] YOB: 1965      Visit Date: 8/17/2021 10:53  Age: 49 Years 0 Months Old  Examining Physician: Dr. Melquiades Singleton   Referring Physician: Dr. Christine Dodson  Technician: Kahlil Santo   Height: 5 feet 6 inch  Weight: 235 lbs  Notes: Swelling of both hands, polyarthralgia      Motor NCS      Nerve / Sites Latency Amplitude Amp. 1-2 Distance Lat Diff Velocity Temp.    ms mV % cm ms m/s °C   R Median - APB      Wrist 5.36 6.2 100 8   34.4      Elbow 9.53 5.8 94.3 20 4.17 48 34.3   L Median - APB      Wrist 5.31 5.2 100 8   33.6      Elbow 10.31 3.9 73.6 20 5.00 40 33.8   R Ulnar - ADM      Wrist 1.72 8.3 100 8   34.3      B. Elbow 5.05 5.3 63.5 19 3.33 57 33.9      A. Elbow 6.82 4.3 51.6 10 1.77 56 34             Sensory NCS      Nerve / Sites Onset Lat Peak Lat PP Amp Amp. 1-2 Distance Velocity Temp.    ms ms µV % cm m/s °C   R Median - Digit II (Antidromic)      Mid Palm 1.61 2.29 10.6 100 7 43 33.8      Wrist 4.01 4.84 7.6 102 14 35 33.9   L Median - Digit II (Antidromic)      Mid Palm 1.51 2.29 18.3 100 7 46 33.8      Wrist 4.38 5.42 17.7 72.4 14 32 33.6   R Ulnar - Digit V (Antidromic)      Wrist 2.81 3.70 22.2 100 14 50 33.8   R Radial - Anatomical  (Forearm)      Forearm 1.82 2.40 22.8 100 10 55 34.4               F  Wave      Nerve F Lat M Lat F-M Lat    ms ms ms   R Median - APB 33.5 5.7 27.8   R Ulnar - ADM 29.2 3.2 26.0   L Median - APB 32.5 5.4 27.1       EMG         EMG Summary Table     Spontaneous MUAP Recruitment   Muscle IA Fib PSW Fasc H.F. Amp Dur. PPP Pattern   R. Abductor pollicis brevis Normal None None None None 1+ 1+ None Decr   L. Abductor pollicis brevis Normal None None None None 1+ 1+ None Decr      Summary of Findings:   Nerve conduction studies:   · The following nerve conduction studies were abnormal:   · The right and left median sensory nerve conductions recording from the second digits are prolonged in distal latency with reduced amplitude on the right side. · The right and left median motor nerve conductions (recording from the abductor pollicis brevis muscles) are prolonged in distal latency with normal amplitudes and slowed motor nerve conduction velocities. · The right and left median F-wave responses are mildly slowed. · All other nerve conduction studies listed in the table above were normal in latency, amplitude and conduction velocity.       Needle EMG:

## 2021-08-17 NOTE — TELEPHONE ENCOUNTER
Please let the patient know that blood work results showed    Sugar was mildly elevated. Hemoglobin A1c, measure of 3-month sugar control was more elevated now at 7.3. Would need to consider restarting medications    1 liver function test was borderline elevated, uncertain as to cause or significance, other liver functions were normal    Other electrolytes and kidney functions were normal    Cholesterol levels were fair. HDL or good cholesterol was in the beneficial range. Overall improved when compared to previous.  Would consider statins given diabetes    Urine analysis showed slight bacteria and white cells but otherwise was normal with no evidence for microscopic blood or protein     vitamin D levels were low and would continue present supplement    Blood counts were normal    Please also let the patient know that EMG study of the bilateral upper extremities suggested chronic left and right carpal tunnel    Would recommend Ortho hand evaluation     Thanks

## 2021-08-18 ENCOUNTER — TELEPHONE (OUTPATIENT)
Dept: FAMILY MEDICINE CLINIC | Age: 56
End: 2021-08-18

## 2021-08-18 NOTE — TELEPHONE ENCOUNTER
Requested Prescriptions     Signed Prescriptions Disp Refills    metFORMIN (GLUCOPHAGE) 500 MG tablet 30 tablet 5     Sig: Take 1 tablet by mouth daily (with breakfast)     Authorizing Provider: Rosalie Weinstein     medication sent to pharmacy    Orders Placed This Encounter   Procedures    Raphael Aguero MD, [facility], [place]     Referral Priority:   Routine     Referral Type:   Eval and Treat     Referral Reason:   Specialty Services Required     Referred to Provider:   Jarad Bryant MD     Requested Specialty:   Orthopedic Surgery     Number of Visits Requested:   1     order for referral placed as above

## 2021-08-18 NOTE — TELEPHONE ENCOUNTER
Notified patient. Patient verbalized understanding. Pt is in agreement with referral to ortho. She is in agreement with a diabetes medication. She will think about the statin and give us a call back.

## 2021-08-18 NOTE — TELEPHONE ENCOUNTER
Left a detailed message for patient. She may drop off the forms but Dr Jessie Boles would like her to include dates and specific information as to what type of disability. We will call if there are questions.

## 2021-08-18 NOTE — TELEPHONE ENCOUNTER
Pt called asking if she could drop off a short term disability form and pick it up when it was finished? She would like to send this in herself.

## 2021-08-18 NOTE — TELEPHONE ENCOUNTER
Rina called back and was advised. She understands that the more information she can provide, the quicker it can be completed.

## 2021-08-20 ENCOUNTER — TELEPHONE (OUTPATIENT)
Dept: FAMILY MEDICINE CLINIC | Age: 56
End: 2021-08-20

## 2021-08-20 NOTE — TELEPHONE ENCOUNTER
Mike Mazariegos is asking if you can extend her return to work note from Monday the 23rd, to Monday the 30th? If this is acceptable, she will need it faxed and is going to call back with that fax number. Please advise.

## 2021-08-24 ENCOUNTER — TELEPHONE (OUTPATIENT)
Dept: FAMILY MEDICINE CLINIC | Age: 56
End: 2021-08-24

## 2021-08-24 DIAGNOSIS — G56.03 BILATERAL CARPAL TUNNEL SYNDROME: Primary | ICD-10-CM

## 2021-08-24 RX ORDER — MELOXICAM 7.5 MG/1
TABLET ORAL
Qty: 30 TABLET | Refills: 1 | Status: SHIPPED
Start: 2021-08-24 | End: 2021-10-19 | Stop reason: SDUPTHER

## 2021-08-24 NOTE — TELEPHONE ENCOUNTER
Could consider an NSAID such as naproxen or ibuprofen -these can however cause worsening of reflux in potentially cause ulcers and/or kidney issues with long-term use    Other option could be to consider meloxicam which is similar to NSAID type medicines like ibuprofen Motrin and does carry some of the similar side effect profile but longer acting

## 2021-08-24 NOTE — TELEPHONE ENCOUNTER
Spoke w/ pt, she is currently switching back and forth between Tylenol and Motrin. States that she \"eats them every 4-6 hrs\". She is wearing night splints. Appt w/ Dena Moore is scheduled for 9/20/21. Per verbal from Dr Kwasi Bella, okay to send in Mobic 7.5mg qd #30 w/ 1 refill.

## 2021-08-24 NOTE — TELEPHONE ENCOUNTER
Rina was in to drop off paperwork and was asking if there is anything you could call in for her for her pain. She said Tylenol is just not working for her. Please Advise.

## 2021-08-25 ENCOUNTER — HOSPITAL ENCOUNTER (OUTPATIENT)
Dept: MRI IMAGING | Age: 56
Discharge: HOME | End: 2021-08-27
Payer: COMMERCIAL

## 2021-08-25 DIAGNOSIS — M25.511 ACUTE PAIN OF RIGHT SHOULDER: ICD-10-CM

## 2021-08-25 PROCEDURE — 73221 MRI JOINT UPR EXTREM W/O DYE: CPT

## 2021-08-27 ENCOUNTER — TELEPHONE (OUTPATIENT)
Dept: FAMILY MEDICINE CLINIC | Age: 56
End: 2021-08-27

## 2021-08-27 NOTE — RESULT ENCOUNTER NOTE
Can we let her know MRI shows some tendinitis in her rotator cuff, also degenerative tears of her labrum and biceps tendinitis.   We can see her back to discuss and determine treatment options

## 2021-08-27 NOTE — TELEPHONE ENCOUNTER
I did see the MRI results and I would defer further questions to orthopedics    However on her disability forms that I filled out this week I believe I did extend the date till the end of September, those forms may be at [employer]

## 2021-09-02 NOTE — RESULT ENCOUNTER NOTE
Voicemail was left instructing patient what MRI showed and to call office to set up appointment to discuss options

## 2021-09-08 ENCOUNTER — TELEPHONE (OUTPATIENT)
Dept: FAMILY MEDICINE CLINIC | Age: 56
End: 2021-09-08

## 2021-09-08 NOTE — TELEPHONE ENCOUNTER
Spoke w/ Maddie Coronel and answered questions. Her [facility] paperwork has her off through the end of Sept. This will allow time for her to see Dr Graciela Silva and to see what he recommends for her CTS.

## 2021-09-15 ENCOUNTER — OFFICE VISIT (OUTPATIENT)
Dept: ORTHOPEDIC SURGERY | Age: 56
End: 2021-09-15
Payer: COMMERCIAL

## 2021-09-15 VITALS — WEIGHT: 235 LBS | HEIGHT: 66 IN | BODY MASS INDEX: 37.77 KG/M2

## 2021-09-15 DIAGNOSIS — M25.511 ACUTE PAIN OF RIGHT SHOULDER: Primary | ICD-10-CM

## 2021-09-15 PROCEDURE — 20610 DRAIN/INJ JOINT/BURSA W/O US: CPT | Performed by: ORTHOPAEDIC SURGERY

## 2021-09-15 RX ORDER — LIDOCAINE HYDROCHLORIDE 10 MG/ML
4 INJECTION, SOLUTION INFILTRATION; PERINEURAL ONCE
Status: COMPLETED | OUTPATIENT
Start: 2021-09-15 | End: 2021-09-15

## 2021-09-15 RX ORDER — TRIAMCINOLONE ACETONIDE 40 MG/ML
40 INJECTION, SUSPENSION INTRA-ARTICULAR; INTRAMUSCULAR ONCE
Status: COMPLETED | OUTPATIENT
Start: 2021-09-15 | End: 2021-09-15

## 2021-09-15 RX ADMIN — LIDOCAINE HYDROCHLORIDE 4 ML: 10 INJECTION, SOLUTION INFILTRATION; PERINEURAL at 11:08

## 2021-09-15 RX ADMIN — TRIAMCINOLONE ACETONIDE 40 MG: 40 INJECTION, SUSPENSION INTRA-ARTICULAR; INTRAMUSCULAR at 11:08

## 2021-09-15 NOTE — PROGRESS NOTES
Follow Up Visit     Bolivar Ibanez returns today for follow up visit regarding Right shoulder pain. Treatment thus far has included obtained MRI of right shoulder, she is here today to review results. She reports symptoms are unchanged. Physical Exam:     Height: 5' 6\" (1.676 m), Weight: 235 lb (106.6 kg) (per pt)    General: Alert and oriented x3, no acute distress  Cardiovascular/pulmonary: No labored breathing, peripheral perfusion intact  Musculoskeletal:    Right shoulder exam range of motion 140/60/T10. Pain present with active motion. Pain without weakness on Jobes and speeds exams. Positive Brookings's exam.  Pain present with belly press exam.  Anterior joint tenderness present on palpation. No swelling deformity visualized on inspection. Controlled Substances Monitoring:      Imaging:  MRI of the right shoulder showed some tendinosis of the supraspinatus no obvious tearing visualized. Abnormal signal surrounding the long head of the biceps tendon remainder of shoulder appears unremarkable. Procedure Note: Shoulder Steroid Injection     The Right shoulder was identified as the injection site. The risk and benefits of a cortisone injection were explained and the patient consented to the injection. Under sterile conditions, the subacromial space was injected from posterior approach with a mixture of 40 mg of Kenalog, 4 cc  1% Lidocaine without complication. A sterile bandage was applied. Administrations This Visit     lidocaine 1 % injection 4 mL     Admin Date  09/15/2021 Action  Given Dose  4 mL Route  Intra-articular Administered By  Marvell Mortimer, ATC          triamcinolone acetonide (KENALOG-40) injection 40 mg     Admin Date  09/15/2021 Action  Given Dose  40 mg Route  Intra-articular Administered By  Marvell Mortimer, ATC                Assessment: Right shoulder pain, likely mild adhesive capsulitis      Plan: Today we discussed her right shoulder.   Patient reports symptoms remain unchanged. Patient has tried 12 sessions of physical therapy with marginal improvement. MRI today showed no obvious tearing of the rotator cuff. On exam patient has fairly good motion but stiffness at endpoints on elevation, external and internal rotation. She has some mild diffuse tenderness of the shoulder. Clinically she may have mild frozen shoulder in the setting of normal-appearing MRI and no obvious obvious pathology. She was offered steroid injection today and was agreeable. She will continue with PT and transition to home exercises. We will see her back in 3 months, if she continues to have issues we may consider surgical management.     Radha Driscoll MD  [address]

## 2021-09-15 NOTE — PATIENT INSTRUCTIONS
Patient Education        Shoulder Stretches: Exercises  Introduction  Here are some examples of exercises for you to try. The exercises may be suggested for a condition or for rehabilitation. Start each exercise slowly. Ease off the exercises if you start to have pain. You will be told when to start these exercises and which ones will work best for you. How to do the exercises  Shoulder stretch   1.  a doorway and place one arm against the door frame. Your elbow should be a little higher than your shoulder. 2. Relax your shoulders as you lean forward, allowing your chest and shoulder muscles to stretch. You can also turn your body slightly away from your arm to stretch the muscles even more. 3. Hold for 15 to 30 seconds. 4. Repeat 2 to 4 times with each arm. Shoulder and chest stretch   1. Shoulder and chest stretch  2. While sitting, relax your upper body so you slump slightly in your chair. 3. As you breathe in, straighten your back and open your arms out to the sides. 4. Gently pull your shoulder blades back and downward. 5. Hold for 15 to 30 seconds as your breathe normally. 6. Repeat 2 to 4 times. Overhead stretch   1. Reach up over your head with both arms. 2. Hold for 15 to 30 seconds. 3. Repeat 2 to 4 times. Follow-up care is a key part of your treatment and safety. Be sure to make and go to all appointments, and call your doctor if you are having problems. It's also a good idea to know your test results and keep a list of the medicines you take. Where can you learn more? Go to https://mark.Swagbucks. org and sign in to your AdLemons account. Enter S254 in the zeenworld box to learn more about \"Shoulder Stretches: Exercises. \"     If you do not have an account, please click on the \"Sign Up Now\" link. Current as of: November 16, 2020               Content Version: 12.9  © 6606-6152 Healthwise, Incorporated.    Care instructions adapted under license by [facility] ([facility]). If you have questions about a medical condition or this instruction, always ask your healthcare professional. [place] [address] any warranty or liability for your use of this information.

## 2021-09-20 ENCOUNTER — OFFICE VISIT (OUTPATIENT)
Dept: ORTHOPEDIC SURGERY | Age: 56
End: 2021-09-20
Payer: COMMERCIAL

## 2021-09-20 VITALS — BODY MASS INDEX: 37.77 KG/M2 | HEIGHT: 66 IN | RESPIRATION RATE: 18 BRPM | WEIGHT: 235 LBS

## 2021-09-20 DIAGNOSIS — G56.03 BILATERAL CARPAL TUNNEL SYNDROME: Primary | ICD-10-CM

## 2021-09-20 PROCEDURE — 20526 THER INJECTION CARP TUNNEL: CPT | Performed by: ORTHOPAEDIC SURGERY

## 2021-09-20 PROCEDURE — G8417 CALC BMI ABV UP PARAM F/U: HCPCS | Performed by: ORTHOPAEDIC SURGERY

## 2021-09-20 PROCEDURE — 1036F TOBACCO NON-USER: CPT | Performed by: ORTHOPAEDIC SURGERY

## 2021-09-20 PROCEDURE — 99203 OFFICE O/P NEW LOW 30 MIN: CPT | Performed by: ORTHOPAEDIC SURGERY

## 2021-09-20 PROCEDURE — 3017F COLORECTAL CA SCREEN DOC REV: CPT | Performed by: ORTHOPAEDIC SURGERY

## 2021-09-20 PROCEDURE — G8427 DOCREV CUR MEDS BY ELIG CLIN: HCPCS | Performed by: ORTHOPAEDIC SURGERY

## 2021-09-20 RX ORDER — BETAMETHASONE SODIUM PHOSPHATE AND BETAMETHASONE ACETATE 3; 3 MG/ML; MG/ML
12 INJECTION, SUSPENSION INTRA-ARTICULAR; INTRALESIONAL; INTRAMUSCULAR; SOFT TISSUE ONCE
Status: COMPLETED | OUTPATIENT
Start: 2021-09-20 | End: 2021-09-20

## 2021-09-20 RX ADMIN — BETAMETHASONE SODIUM PHOSPHATE AND BETAMETHASONE ACETATE 12 MG: 3; 3 INJECTION, SUSPENSION INTRA-ARTICULAR; INTRALESIONAL; INTRAMUSCULAR; SOFT TISSUE at 15:00

## 2021-09-20 NOTE — PROGRESS NOTES
Department of Orthopedic Surgery  [facility] Nicole Resendiz MD  History and Physical      CHIEF COMPLAINT: Bilateral hand numbness and tingling    HISTORY OF PRESENT ILLNESS:                The patient is a 49 y.o. female who presents with bilateral hand numbness and tingling. The symptoms have been present for roughly 7 years. 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 She states that at the end of her work her hands are always numb. She states that she does have some nocturnal symptoms and wakes up with her hands asleep and then burning pain. She states that she was prescribed well future and within the past few months and this caused her hands and arms to swell up significantly. She states that after that her burning pain had significantly gotten worse. She is left-hand dominant. She also has pain at the base of the thumbs has been present for several years as well. EMG reviewed. Motor latencies for median nerve at the wrist.  Right wrist 5.36 left wrist 5.31 sensory latencies for the median nerve are 4.84 for the right and 5.42 for the left. Ulnar velocity for the right is 57 below the elbow 56 above the elbow. EMG portion shows decreased recruitment in the right and left abductor pollicis brevis. Past Medical History:        Diagnosis Date    Bilateral carpal tunnel syndrome 2021    COVID-19 2020    Diabetes mellitus ([facility] [address]     Essential hypertension     Essential tremor     Generalized anxiety disorder 2020    Sleep apnea      Past Surgical History:        Procedure Laterality Date     SECTION      COLONOSCOPY      10 years ago    COLONOSCOPY N/A 3/29/2021    COLORECTAL CANCER SCREENING, NOT HIGH RISK performed by Shilpa Moreno MD at [address] [place] [address]      Current Medications:   No current facility-administered medications for this visit.   Allergies:  Albuterol and Wellbutrin [bupropion]    Social History:   TOBACCO: reports that she quit smoking about 3 years ago. Her smoking use included cigarettes. She started smoking about 23 years ago. She has a 15.00 pack-year smoking history. She has never used smokeless tobacco.  ETOH:   reports current alcohol use. DRUGS:   reports no history of drug use. ACTIVITIES OF DAILY LIVING:    OCCUPATION:    Family History:       Problem Relation Age of Onset    Hypothyroidism Mother     Hypotension Mother     Cancer Father         gallbladder  52yr    Hypotension Brother     Heart Attack Other         Uncle on father's side  age 44       REVIEW OF SYSTEMS:  CONSTITUTIONAL:  negative for  fevers, chills and sweats  EYES:  negative for  redness and icterus  HEENT:  negative for  sore throat and hoarseness  RESPIRATORY:  negative for  dry cough and dyspnea  CARDIOVASCULAR:  negative for  chest pain, orthopnea  MUSCULOSKELETAL: Thumb pain bilaterally  NEUROLOGICAL:  positive for numbness and tingling    PHYSICAL EXAM:    VITALS:  Resp 18   LMP  (LMP Unknown)   CONSTITUTIONAL:  awake, alert, cooperative, no apparent distress, and appears stated age  EYES:  extra-ocular muscles intact  ENT:  normocepalic, without obvious abnormality  NECK:  supple, symmetrical, trachea midline  LUNGS:  no increased work of breathing  CARDIOVASCULAR:  no edema  NEUROLOGIC:  Awake, alert, oriented to name, place and time. Cranial nerves II-XII are grossly intact. Motor is 5 out of 5 bilaterally. MUSCULOSKELETAL:    Right upper extremity  Skin is intact circumferentially. Negative Tinel's at the elbow. Positive Tinel's at the wrist positive Rain compression at the wrist.  Positive CMC grind. Positive Finkelstein's. No pain over any of the A1 pulleys. No active triggering. Neurovascularly intact grossly. Good cap refill to all digits. Left upper extremity  Skin is intact circumferentially. Negative Tinel's at the elbow.   Positive Tinel's at the wrist positive Rain compression at the wrist.  Negative CMC grind. Positive Finkelstein's. No pain over any of the A1 pulleys. No active triggering. Neurovascularly intact grossly. Good cap refill to all digits. DATA:    CBC:   Lab Results   Component Value Date    WBC 6.1 08/16/2021    RBC 4.68 08/16/2021    HGB 13.5 08/16/2021    HCT 41.1 08/16/2021    MCV 87.8 08/16/2021    MCH 28.8 08/16/2021    MCHC 32.8 08/16/2021    RDW 13.6 08/16/2021     08/16/2021    MPV 10.0 08/16/2021     PT/INR:    Lab Results   Component Value Date    PROTIME 11.1 03/04/2020    INR 1.0 03/04/2020       Radiology Review: X-ray of the left wrist reviewed from 8/11/2021.  3 views left wrist no fractures or dislocations noted    Impression: No fractures or dislocation      X-rays of the right wrist reviewed from 8/11/2021.  3 views of the right wrist no fractures or dislocations    Impression: No Fractures and dislocations      IMPRESSION:  · Bilateral carpal tunnel  · Diabetes mellitus  · Essential hypertension  · Anxiety    PLAN:    Discussed with patient treatment options including surgical versus nonsurgical treatment. She has done bracing and stretching to this point. She did not try a steroid injection. She would like to proceed with a steroid injection to see if she gets any relief before moving on to surgery. She will follow-up in 6 weeks. Procedure Note Carpal Tunnel Injection    The bilateral carpal tunnel was identified as the injection site. The risk and benefits of a cortisone injection were explained and the patient consented to the injection. Under sterile conditions, the carpal canal was injected with a mixture of 1 mL of 1% Lidocaine and 1 mL of 6 mg/mL Betamethasone without complication. A sterile bandage was applied. I have seen and evaluated the patient and agree with the above assessment and plan on today's visit.  I have performed the key components of the history and physical examination with significant findings of bilateral carpal tunnel syndrome. Discussed surgery as well as nonoperative treatments. Injections provided. I concur with the findings and plan as documented.     Henry Zuniga MD  9/20/2021

## 2021-09-20 NOTE — PATIENT INSTRUCTIONS
Patient Education        Carpal Tunnel Syndrome: Care Instructions  Overview     Carpal tunnel syndrome is numbness, tingling, weakness, and pain in your hand, wrist, and sometimes forearm. It is caused by pressure on the median nerve. This nerve and several tough tissues called tendons run through a space in the wrist. This space is called the carpal tunnel. The repeated hand motions used in work and some hobbies and sports can put pressure on the median nerve. Pregnancy can cause carpal tunnel syndrome. Several conditions, such as diabetes, arthritis, and an underactive thyroid, can also cause it. You may be able to limit an activity or change the way you do it to reduce your symptoms. You also can take other steps to feel better. If your symptoms are mild, 1 to 2 weeks of home treatment are likely to ease your pain. Surgery is needed only if other treatments do not work. Follow-up care is a key part of your treatment and safety. Be sure to make and go to all appointments, and call your doctor if you are having problems. It's also a good idea to know your test results and keep a list of the medicines you take. How can you care for yourself at home? · If possible, stop or reduce the activity that causes your symptoms. If you cannot stop the activity, take frequent breaks to rest and stretch or change hand positions to do a task. Try switching hands, such as when using a computer mouse. · Try to avoid bending or twisting your wrists. · Ask your doctor if you can take an over-the-counter pain medicine, such as acetaminophen (Tylenol), ibuprofen (Advil, Motrin), or naproxen (Aleve). Be safe with medicines. Read and follow all instructions on the label. · If your doctor prescribes corticosteroid medicine to help reduce pain and swelling, take it exactly as prescribed. Call your doctor if you think you are having a problem with your medicine.   · Put ice or a cold pack on your wrist for 10 to 20 minutes at a time to ease pain. Put a thin cloth between the ice and your skin. · If your doctor or your physical or occupational therapist tells you to wear a wrist splint, wear it as directed to keep your wrist in a neutral position. This also eases pressure on your median nerve. · Ask your doctor whether you should have physical or occupational therapy to learn how to do tasks differently. · Try a yoga class to stretch your muscles and build strength in your hands and wrists. Yoga has been shown to ease carpal tunnel symptoms. To prevent carpal tunnel  · When working at a computer, keep your hands and wrists in line with your forearms. Hold your elbows close to your sides. Take a break every 10 to 15 minutes. · Try these exercises:  ? Warm up: Rotate your wrist up, down, and from side to side. Repeat this 4 times. Stretch your fingers far apart, relax them, then stretch them again. Repeat 4 times. Stretch your thumb by pulling it back gently, holding it, and then releasing it. Repeat 4 times. ? Prayer stretch: Start with your palms together in front of your chest just below your chin. Slowly lower your hands toward your waistline while keeping your hands close to your stomach and your palms together until you feel a mild to moderate stretch under your forearms. Hold for 10 to 20 seconds. Repeat 4 times. ? Wrist flexor stretch: Hold your arm in front of you with your palm up. Bend your wrist, pointing your hand toward the floor. With your other hand, gently bend your wrist further until you feel a mild to moderate stretch in your forearm. Hold for 10 to 20 seconds. Repeat 4 times. ? Wrist extensor stretch: Repeat the steps for the wrist flexor stretch, but begin with your extended hand palm down. · Squeeze a rubber exercise ball several times a day to keep your hands and fingers strong. · Avoid holding objects (such as a book) in one position for a long time. When possible, use your whole hand to grasp an object.  Using just the thumb and index finger can put stress on the wrist.  · Do not smoke. It can make this condition worse by reducing blood flow to the median nerve. If you need help quitting, talk to your doctor about stop-smoking programs and medicines. These can increase your chances of quitting for good. When should you call for help? Watch closely for changes in your health, and be sure to contact your doctor if:    · Your pain or other problems do not get better with home care.     · You want more information about physical or occupational therapy.     · You have side effects of your corticosteroid medicine, such as:  ? Weight gain. ? Mood changes. ? Trouble sleeping. ? Bruising easily.     · You have any other problems with your medicine. Where can you learn more? Go to https://Pepper Networks."[employer]". org and sign in to your Cangrade account. Enter R432 in the FirmPlay box to learn more about \"Carpal Tunnel Syndrome: Care Instructions. \"     If you do not have an account, please click on the \"Sign Up Now\" link. Current as of: November 16, 2020               Content Version: 12.9  © 2006-2021 [email or web address]. Care instructions adapted under license by [facility] ([facility]). If you have questions about a medical condition or this instruction, always ask your healthcare professional. [address] any warranty or liability for your use of this information. Patient Education        Carpal Tunnel Syndrome: Exercises  Introduction  Here are some examples of exercises for you to try. The exercises may be suggested for a condition or for rehabilitation. Start each exercise slowly. Ease off the exercises if you start to have pain. You will be told when to start these exercises and which ones will work best for you. Warm-up stretches  When you no longer have pain or numbness, you can do exercises to help prevent carpal tunnel syndrome from coming back.  Do not do any stretch or movement that is uncomfortable or painful. 1. Rotate your wrist up, down, and from side to side. Repeat 4 times. 2. Stretch your fingers far apart. Relax them, and then stretch them again. Repeat 4 times. 3. Stretch your thumb by pulling it back gently, holding it, and then releasing it. Repeat 4 times. How to do the exercises  Prayer stretch   1. Start with your palms together in front of your chest just below your chin. 2. Slowly lower your hands toward your waistline, keeping your hands close to your stomach and your palms together until you feel a mild to moderate stretch under your forearms. 3. Hold for at least 15 to 30 seconds. Repeat 2 to 4 times. Wrist flexor stretch   1. Extend your arm in front of you with your palm up. 2. Bend your wrist, pointing your hand toward the floor. 3. With your other hand, gently bend your wrist farther until you feel a mild to moderate stretch in your forearm. 4. Hold for at least 15 to 30 seconds. Repeat 2 to 4 times. Wrist extensor stretch   1. Repeat steps 1 through 4 of the stretch above, but begin with your extended hand palm down. Follow-up care is a key part of your treatment and safety. Be sure to make and go to all appointments, and call your doctor if you are having problems. It's also a good idea to know your test results and keep a list of the medicines you take. Where can you learn more? Go to https://ScanntechpepicewBeyond Credentials.[employer]Health. org and sign in to your Executive Channel account. Enter I190 in the [facility] box to learn more about \"Carpal Tunnel Syndrome: Exercises. \"     If you do not have an account, please click on the \"Sign Up Now\" link. Current as of: November 16, 2020               Content Version: 12.9  © 7552-3327 Healthwise, [employer]. Care instructions adapted under license by [facility] ([facility]).  If you have questions about a medical condition or this instruction, always ask your healthcare professional. [employer] disclaims any warranty or liability for your use of this information.

## 2021-09-28 ENCOUNTER — TELEPHONE (OUTPATIENT)
Dept: FAMILY MEDICINE CLINIC | Age: 56
End: 2021-09-28

## 2021-09-28 NOTE — LETTER
[address] [place] [address] Drive  [address] [place] [address]  Phone: [phone]  Fax: [phone]    Elaine Da Silva MD        September 28, 2021     Patient: Tu Palacios   YOB: 1965   Date of Visit: 9/28/2021       To Whom It May Concern: It is my medical opinion that Tu Palacios may return to work on 9/30/2021. She is fit for duty and is able to work without restrictions. If you have any questions or concerns, please don't hesitate to call.     Sincerely,        Elaine Da Silva MD

## 2021-09-28 NOTE — TELEPHONE ENCOUNTER
Forms faxed. Will scan into chart once I have fax confirmations. Originals will be mailed to pt. Left a message updating Rina.

## 2021-09-28 NOTE — TELEPHONE ENCOUNTER
Pt brought in forms to return to work. Faxing forms to [address] Will fax a \"fit for duty\" letter to [facility] at [phone].

## 2021-10-05 ENCOUNTER — OFFICE VISIT (OUTPATIENT)
Dept: FAMILY MEDICINE CLINIC | Age: 56
End: 2021-10-05
Payer: COMMERCIAL

## 2021-10-05 VITALS
OXYGEN SATURATION: 96 % | BODY MASS INDEX: 37.93 KG/M2 | SYSTOLIC BLOOD PRESSURE: 130 MMHG | HEART RATE: 92 BPM | HEIGHT: 66 IN | WEIGHT: 236 LBS | DIASTOLIC BLOOD PRESSURE: 88 MMHG | TEMPERATURE: 97.1 F

## 2021-10-05 DIAGNOSIS — I10 ESSENTIAL HYPERTENSION: ICD-10-CM

## 2021-10-05 DIAGNOSIS — E66.09 CLASS 2 OBESITY DUE TO EXCESS CALORIES WITHOUT SERIOUS COMORBIDITY WITH BODY MASS INDEX (BMI) OF 39.0 TO 39.9 IN ADULT: ICD-10-CM

## 2021-10-05 DIAGNOSIS — M25.511 ACUTE PAIN OF RIGHT SHOULDER: ICD-10-CM

## 2021-10-05 DIAGNOSIS — L65.9 ALOPECIA: ICD-10-CM

## 2021-10-05 DIAGNOSIS — F41.1 GENERALIZED ANXIETY DISORDER: ICD-10-CM

## 2021-10-05 DIAGNOSIS — K21.9 GASTROESOPHAGEAL REFLUX DISEASE WITHOUT ESOPHAGITIS: ICD-10-CM

## 2021-10-05 DIAGNOSIS — E11.65 TYPE 2 DIABETES MELLITUS WITH HYPERGLYCEMIA, WITHOUT LONG-TERM CURRENT USE OF INSULIN (HCC): ICD-10-CM

## 2021-10-05 DIAGNOSIS — Z79.899 LONG TERM CURRENT USE OF THERAPEUTIC DRUG: ICD-10-CM

## 2021-10-05 DIAGNOSIS — E55.9 VITAMIN D DEFICIENCY: ICD-10-CM

## 2021-10-05 DIAGNOSIS — G56.03 BILATERAL CARPAL TUNNEL SYNDROME: Primary | ICD-10-CM

## 2021-10-05 DIAGNOSIS — Z23 NEED FOR IMMUNIZATION AGAINST INFLUENZA: ICD-10-CM

## 2021-10-05 DIAGNOSIS — R53.83 OTHER FATIGUE: ICD-10-CM

## 2021-10-05 DIAGNOSIS — Z82.49 FAMILY HX OF ISCHEM HEART DIS AND OTH DIS OF THE CIRC SYS: ICD-10-CM

## 2021-10-05 DIAGNOSIS — G47.33 OBSTRUCTIVE SLEEP APNEA SYNDROME: ICD-10-CM

## 2021-10-05 PROCEDURE — 90674 CCIIV4 VAC NO PRSV 0.5 ML IM: CPT | Performed by: INTERNAL MEDICINE

## 2021-10-05 PROCEDURE — G8417 CALC BMI ABV UP PARAM F/U: HCPCS | Performed by: INTERNAL MEDICINE

## 2021-10-05 PROCEDURE — G8427 DOCREV CUR MEDS BY ELIG CLIN: HCPCS | Performed by: INTERNAL MEDICINE

## 2021-10-05 PROCEDURE — 2022F DILAT RTA XM EVC RTNOPTHY: CPT | Performed by: INTERNAL MEDICINE

## 2021-10-05 PROCEDURE — 1036F TOBACCO NON-USER: CPT | Performed by: INTERNAL MEDICINE

## 2021-10-05 PROCEDURE — 90471 IMMUNIZATION ADMIN: CPT | Performed by: INTERNAL MEDICINE

## 2021-10-05 PROCEDURE — 3051F HG A1C>EQUAL 7.0%<8.0%: CPT | Performed by: INTERNAL MEDICINE

## 2021-10-05 PROCEDURE — 3017F COLORECTAL CA SCREEN DOC REV: CPT | Performed by: INTERNAL MEDICINE

## 2021-10-05 PROCEDURE — 99214 OFFICE O/P EST MOD 30 MIN: CPT | Performed by: INTERNAL MEDICINE

## 2021-10-05 PROCEDURE — G8482 FLU IMMUNIZE ORDER/ADMIN: HCPCS | Performed by: INTERNAL MEDICINE

## 2021-10-05 RX ORDER — LOSARTAN POTASSIUM 25 MG/1
25 TABLET ORAL DAILY
Qty: 90 TABLET | Refills: 0 | Status: SHIPPED
Start: 2021-10-05 | End: 2022-02-17 | Stop reason: SDUPTHER

## 2021-10-05 RX ORDER — OMEPRAZOLE 20 MG/1
20 CAPSULE, DELAYED RELEASE ORAL
Qty: 30 CAPSULE | Refills: 2 | Status: SHIPPED | OUTPATIENT
Start: 2021-10-05

## 2021-10-05 RX ORDER — ERGOCALCIFEROL 1.25 MG/1
50000 CAPSULE ORAL WEEKLY
Qty: 12 CAPSULE | Refills: 0 | Status: SHIPPED
Start: 2021-10-05 | End: 2022-02-17 | Stop reason: SDUPTHER

## 2021-10-05 RX ORDER — HYDROCHLOROTHIAZIDE 12.5 MG/1
12.5 CAPSULE, GELATIN COATED ORAL EVERY MORNING
Qty: 90 CAPSULE | Refills: 0 | Status: SHIPPED
Start: 2021-10-05 | End: 2022-02-17 | Stop reason: SDUPTHER

## 2021-10-05 SDOH — ECONOMIC STABILITY: FOOD INSECURITY: WITHIN THE PAST 12 MONTHS, YOU WORRIED THAT YOUR FOOD WOULD RUN OUT BEFORE YOU GOT MONEY TO BUY MORE.: NEVER TRUE

## 2021-10-05 SDOH — ECONOMIC STABILITY: FOOD INSECURITY: WITHIN THE PAST 12 MONTHS, THE FOOD YOU BOUGHT JUST DIDN'T LAST AND YOU DIDN'T HAVE MONEY TO GET MORE.: NEVER TRUE

## 2021-10-05 ASSESSMENT — SOCIAL DETERMINANTS OF HEALTH (SDOH): HOW HARD IS IT FOR YOU TO PAY FOR THE VERY BASICS LIKE FOOD, HOUSING, MEDICAL CARE, AND HEATING?: NOT HARD AT ALL

## 2021-10-05 ASSESSMENT — ENCOUNTER SYMPTOMS
CONSTIPATION: 0
CHEST TIGHTNESS: 0
SORE THROAT: 0
SHORTNESS OF BREATH: 0
DIARRHEA: 0
NAUSEA: 0
COUGH: 0
BLOOD IN STOOL: 0
VOMITING: 0
RHINORRHEA: 0
ABDOMINAL PAIN: 0
EYE PAIN: 0

## 2021-10-05 NOTE — PROGRESS NOTES
[address]  Physicians   Internal Medicine     10/5/2021  Christiano Bryant : 1965 Sex: female  Age:56 y.o. Chief Complaint   Patient presents with    3 Month Follow-Up    Carpal Tunnel     seeing Dr Tona Hill     Diabetes    Hypertension        HPI:   Patient presents to office for evaluation of the following medical concerns. - States hand and wrist pain. States was having swelling, decreased ROM. States was fe;t having reaction to new Wellbutrin XR, (2021) stopped med, toradol x 1, prednisone, labs RF,JESUSITA, ESR, CK, CBC, CMP - neg, CRP incr, gluc incr. HAd EMG () - electrodiagnostic evidence for a chronic right and left median mononeuropathy at the wrist (carpal tunnel syndrome) with mild chronic denervation. Had seen ortho hand and injections to b/l wrist (2021). For follow up. Still taking meloxicam. Improved. - States having right shoulder pain. States does repetitive motion. No known truama or injury No swelling. No numbness. States did physical therapy and helped. Had MRI shoulder () suggesting rotator ciff and tendonosis. Following with ortho. States had injection (2021) improved     - States issues with anxiety. Improved but not controlled. On zoloft 100mg daily. States off lorazepam. Added hydroxyzine as needed (taking twice a day). States some side effects with medications, fatigue. Following with psychiatry and psychology. States feels overwhelmed at times. No suicidal thoughts.      - States has diabetes. States last A1c was 7.3 (2021). States restarted metformin.     - States has high blood pressure. Occasionally checking blood pressure at home. States has been having dry cough. States also having alopecia. Uncertain if alopecia is lisinopril and or zoloft. - States has MICKI. States not currently wearing mask. Needs new sleep study. States sleeping has improved with medications. States has appointment with sleep center, cancelled.       - States has GERD symptoms. States trying to watch diet. On tums otc. States symptoms are worsening.     - has obesity.     - States has tremors. Was told essential tremors. Uncontrolled. - States has vitamin D def. On supplement.     - States has swelling to axilla b/l. Nontender to palpation. Health Maintenance   - immunizations:   Influenza Vaccination - (2020), (2021)    Pneumonia Vaccination  Zoster/Shingles Vaccine  Tetanus Vaccination  covid     - Screenings:   Bone Density Scan   Pelvic/Pap Exam  Mammogram - (7/20) - negative      Colonoscopy (3/21) - normal colonoscopy, follow up in 5 yrs     ROS:  Review of Systems   Constitutional: Negative for appetite change, chills, fever and unexpected weight change. HENT: Negative for congestion, rhinorrhea and sore throat. Eyes: Negative for pain and visual disturbance. Respiratory: Negative for cough, chest tightness and shortness of breath. Cardiovascular: Negative for chest pain and palpitations. Gastrointestinal: Negative for abdominal pain, blood in stool, constipation, diarrhea, nausea and vomiting. Genitourinary: Negative for difficulty urinating, dysuria, frequency, pelvic pain, urgency and vaginal bleeding. Musculoskeletal: Negative for arthralgias and myalgias. Skin: Negative for rash. Neurological: Negative for dizziness, syncope, weakness, light-headedness, numbness and headaches. Hematological: Negative for adenopathy. Psychiatric/Behavioral: Negative for suicidal ideas. The patient is nervous/anxious.           Current Outpatient Medications:     vitamin D (ERGOCALCIFEROL) 1.25 MG (64596 UT) CAPS capsule, Take 1 capsule by mouth once a week, Disp: 12 capsule, Rfl: 0    losartan (COZAAR) 25 MG tablet, Take 1 tablet by mouth daily, Disp: 90 tablet, Rfl: 0    hydroCHLOROthiazide (MICROZIDE) 12.5 MG capsule, Take 1 capsule by mouth every morning, Disp: 90 capsule, Rfl: 0    omeprazole (PRILOSEC) 20 MG delayed release capsule, Take 1 capsule by mouth every morning (before breakfast), Disp: 30 capsule, Rfl: 2    meloxicam (MOBIC) 7.5 MG tablet, 1 tablet daily as needed for hand pain. Take with food. , Disp: 30 tablet, Rfl: 1    metFORMIN (GLUCOPHAGE) 500 MG tablet, Take 1 tablet by mouth daily (with breakfast), Disp: 30 tablet, Rfl: 5    sertraline (ZOLOFT) 100 MG tablet, Take 100 mg by mouth daily , Disp: , Rfl:     hydrOXYzine (VISTARIL) 25 MG capsule, Take 25 mg by mouth 3 times daily as needed for Anxiety , Disp: , Rfl:     cyclobenzaprine (FLEXERIL) 10 MG tablet, Take 1 tablet by mouth 3 times daily as needed for Muscle spasms, Disp: 60 tablet, Rfl: 2    Allergies   Allergen Reactions    Albuterol      Made her feel funny     Wellbutrin [Bupropion] Swelling and Other (See Comments)     Upper extremity arthralgias       Past Medical History:   Diagnosis Date    Bilateral carpal tunnel syndrome 2021    COVID-19 2020    Diabetes mellitus ([facility] [address]     Essential hypertension     Essential tremor     Generalized anxiety disorder 2020    Sleep apnea        Past Surgical History:   Procedure Laterality Date     SECTION      COLONOSCOPY      10 years ago    COLONOSCOPY N/A 3/29/2021    COLORECTAL CANCER SCREENING, NOT HIGH RISK performed by Bessie Rosa MD at [address] [place] [address]        Family History   Problem Relation Age of Onset    Hypothyroidism Mother     Hypotension Mother     Cancer Father         gallbladder  52yr    Hypotension Brother     Heart Attack Other         Uncle on father's side  age 44       Social History     Socioeconomic History    Marital status:      Spouse name: None    Number of children: None    Years of education: None    Highest education level: None   Occupational History    None   Tobacco Use    Smoking status: Former Smoker     Packs/day: 0.75     Years: 20.00     Pack years: 15.00     Types: Cigarettes     Start date: 7/20/1998     Quit date: 3/4/2018     Years since quitting: 3.5    Smokeless tobacco: Never Used   Substance and Sexual Activity    Alcohol use: Yes    Drug use: Never    Sexual activity: None   Other Topics Concern    None   Social History Narrative    None     Social Determinants of Health     Financial Resource Strain: Low Risk     Difficulty of Paying Living Expenses: Not hard at all   Food Insecurity: No Food Insecurity    Worried About Running Out of Food in the Last Year: Never true    Stephanie of Food in the Last Year: Never true   Transportation Needs:     Lack of Transportation (Medical):  Lack of Transportation (Non-Medical):    Physical Activity:     Days of Exercise per Week:     Minutes of Exercise per Session:    Stress:     Feeling of Stress :    Social Connections:     Frequency of Communication with Friends and Family:     Frequency of Social Gatherings with Friends and Family:     Attends [employer] Services:     Active Member of Clubs or Organizations:     Attends Club or Organization Meetings:     Marital Status:    Intimate Partner Violence:     Fear of Current or Ex-Partner:     Emotionally Abused:     Physically Abused:     Sexually Abused:         Vitals:    10/05/21 1549   BP: 130/88   Site: Left Upper Arm   Position: Sitting   Cuff Size: Large Adult   Pulse: 92   Temp: 97.1 °F (36.2 °C)   SpO2: 96%   Weight: 236 lb (107 kg)   Height: 5' 6\" (1.676 m)       Exam:  Physical Exam  Vitals reviewed. Constitutional:       Appearance: She is well-developed. HENT:      Head: Normocephalic and atraumatic. Right Ear: External ear normal.      Left Ear: External ear normal.   Eyes:      Pupils: Pupils are equal, round, and reactive to light. Neck:      Thyroid: No thyromegaly. Cardiovascular:      Rate and Rhythm: Normal rate and regular rhythm. Heart sounds: Normal heart sounds. No murmur heard.      Pulmonary:      Effort: Pulmonary effort is normal. Breath sounds: Normal breath sounds. No wheezing. Abdominal:      General: Bowel sounds are normal. There is no distension. Palpations: Abdomen is soft. Tenderness: There is no abdominal tenderness. There is no guarding or rebound. Musculoskeletal:         General: Normal range of motion. Cervical back: Normal range of motion and neck supple. Lymphadenopathy:      Cervical: No cervical adenopathy. Skin:     General: Skin is warm and dry. Neurological:      Mental Status: She is alert and oriented to person, place, and time. Cranial Nerves: No cranial nerve deficit.    Psychiatric:         Judgment: Judgment normal.         Assessment and Plan:    Diagnoses and all orders for this visit:    Gastroesophageal reflux disease without esophagitis  - watch diet   - trial of prilosec 2-4 weeks   - if not improving GI referral     Bilateral carpal tunnel syndrome  - xray hands and wrists   - EMG suggested mild carpal tunnel   - had injections to b/l wrist   - improved     Alopecia  - thyroid was normal   - check testosterone and DHE  - has seen endocrinology   - per patient improved and stable   -  (5/21) -hirsutism -order labs, DHEA (dec), , DHEA-S (-), testosterone (-), 17 hydroxyprogesterone (-), cortisol (-)  androstenedione (-), cmp (-), gluc (incr)     Axillary swelling  - states bilateral and non tender  - US was nornmal     Generalized anxiety disorder  - now following with psychiatry   - on zoloft 100mg per psychiatry   - added hydroxyzine prn   - off Wellbutrin - had reaction (8/2021)   - no suicidal thoughts   - ER if worsening     Essential hypertension  - watch diet   - monitor blood pressure   - on lisinopril since DM diagnosis - states dry cough and poss alopecia   - stop lisinopril due to cough   - on losartan   - on hctz   - stable     Type 2 diabetes mellitus with hyperglycemia, without long-term current use of insulin (HCC)  - watch diet   - was off metformin 500mg daily   - follow A1c - last was 7.3 (8/2021)   - restarted metformin   - follow off meds     Not on ACE  Not on statin   Not aspirin   Follows opth     Obstructive sleep apnea syndrome  - has not been wearing cpap   - will need new sleep study     Vitamin D deficiency  - on vit D [id number] weekly   - follow labs     Class 2 obesity due to excess calories without serious comorbidity with body mass index (BMI) of 35.0 to 35.9 in adult  - discussed diet and exercise   - has lost weight since last visit (9/2020)     Acute pain of right shoulder  - recommend exercises   - seen ortho   - had injection (9/2021)   - improved     Hip pain - right   - previous injury and surgery   - asking for referral     Dizziness  - elements of vertigo or poss ortho stasis   - discussed medications   - referral to ENT    - home exercises    Return in about 3 months (around 1/5/2022) for check up and review and labs.     Orders Placed This Encounter   Procedures    INFLUENZA, MDCK QUADV, 2 YRS AND OLDER, IM, PF, PREFILL SYR OR SDV, 0.5ML (FLUCELVAX QUADV, PF)    Comprehensive Metabolic Panel     Standing Status:   Future     Standing Expiration Date:   10/5/2022    Magnesium     Standing Status:   Future     Standing Expiration Date:   10/5/2022    Lipid, Fasting     Standing Status:   Future     Standing Expiration Date:   10/5/2022    Hemoglobin A1C     Standing Status:   Future     Standing Expiration Date:   10/5/2022    Vitamin D 25 Hydroxy     Standing Status:   Future     Standing Expiration Date:   10/5/2022    TSH without Reflex     Standing Status:   Future     Standing Expiration Date:   1/5/2022    Urinalysis     Standing Status:   Future     Standing Expiration Date:   10/5/2022    CBC Auto Differential     Standing Status:   Future     Standing Expiration Date:   10/5/2022    Vitamin B12 & Folate     Standing Status:   Future     Standing Expiration Date:   10/5/2022    Microalbumin, Ur     Standing Status:   Future Standing Expiration Date:   10/5/2022   [address] [address], MD, Sleep Medicine, [facility]     Referral Priority:   Routine     Referral Type:   Eval and Treat     Referral Reason:   Specialty Services Required     Referred to Provider:   Gabriella Greenberg MD     Number of Visits Requested:   1     Requested Prescriptions     Signed Prescriptions Disp Refills    vitamin D (ERGOCALCIFEROL) 1.25 MG (86572 UT) CAPS capsule 12 capsule 0     Sig: Take 1 capsule by mouth once a week    losartan (COZAAR) 25 MG tablet 90 tablet 0     Sig: Take 1 tablet by mouth daily    hydroCHLOROthiazide (MICROZIDE) 12.5 MG capsule 90 capsule 0     Sig: Take 1 capsule by mouth every morning    omeprazole (PRILOSEC) 20 MG delayed release capsule 30 capsule 2     Sig: Take 1 capsule by mouth every morning (before breakfast)        Gregoria Soni MD  10/5/2021  4:34 PM

## 2021-10-19 DIAGNOSIS — G56.03 BILATERAL CARPAL TUNNEL SYNDROME: ICD-10-CM

## 2021-10-19 RX ORDER — MELOXICAM 7.5 MG/1
TABLET ORAL
Qty: 30 TABLET | Refills: 1 | Status: SHIPPED
Start: 2021-10-19 | End: 2021-12-14 | Stop reason: SDUPTHER

## 2021-10-19 NOTE — TELEPHONE ENCOUNTER
Pharmacy is requesting a refill    Last Appointment:  10/5/2021  Future Appointments   Date Time Provider Carlo Worley   10/27/2021  2:50 PM Lizbet Valero MD [facility]   11/2/2021  3:00 PM Desirae Thomas MD [facility]   1/11/2022  3:45 PM Jemima Maria  [address] [place] [address]   1/17/2022 11:00 AM KEN Martinez - CNP [facility]

## 2021-11-29 ENCOUNTER — OFFICE VISIT (OUTPATIENT)
Dept: FAMILY MEDICINE CLINIC | Age: 56
End: 2021-11-29
Payer: COMMERCIAL

## 2021-11-29 ENCOUNTER — TELEPHONE (OUTPATIENT)
Dept: FAMILY MEDICINE CLINIC | Age: 56
End: 2021-11-29

## 2021-11-29 VITALS
BODY MASS INDEX: 37.61 KG/M2 | HEART RATE: 95 BPM | SYSTOLIC BLOOD PRESSURE: 126 MMHG | DIASTOLIC BLOOD PRESSURE: 80 MMHG | WEIGHT: 233 LBS | TEMPERATURE: 97.6 F | OXYGEN SATURATION: 98 %

## 2021-11-29 DIAGNOSIS — R32 URINARY INCONTINENCE, UNSPECIFIED TYPE: ICD-10-CM

## 2021-11-29 DIAGNOSIS — R50.9 FEVER, UNSPECIFIED FEVER CAUSE: Primary | ICD-10-CM

## 2021-11-29 DIAGNOSIS — N39.0 URINARY TRACT INFECTION WITHOUT HEMATURIA, SITE UNSPECIFIED: ICD-10-CM

## 2021-11-29 LAB
BILIRUBIN, POC: NORMAL
BLOOD URINE, POC: NORMAL
CLARITY, POC: CLEAR
COLOR, POC: NORMAL
GLUCOSE URINE, POC: NORMAL
KETONES, POC: NORMAL
LEUKOCYTE EST, POC: NORMAL
NITRITE, POC: NORMAL
PH, POC: 5.5
PROTEIN, POC: 30
SPECIFIC GRAVITY, POC: 1.02
UROBILINOGEN, POC: 1

## 2021-11-29 PROCEDURE — 3017F COLORECTAL CA SCREEN DOC REV: CPT | Performed by: STUDENT IN AN ORGANIZED HEALTH CARE EDUCATION/TRAINING PROGRAM

## 2021-11-29 PROCEDURE — G8482 FLU IMMUNIZE ORDER/ADMIN: HCPCS | Performed by: STUDENT IN AN ORGANIZED HEALTH CARE EDUCATION/TRAINING PROGRAM

## 2021-11-29 PROCEDURE — 1036F TOBACCO NON-USER: CPT | Performed by: STUDENT IN AN ORGANIZED HEALTH CARE EDUCATION/TRAINING PROGRAM

## 2021-11-29 PROCEDURE — 99213 OFFICE O/P EST LOW 20 MIN: CPT | Performed by: STUDENT IN AN ORGANIZED HEALTH CARE EDUCATION/TRAINING PROGRAM

## 2021-11-29 PROCEDURE — 81002 URINALYSIS NONAUTO W/O SCOPE: CPT | Performed by: STUDENT IN AN ORGANIZED HEALTH CARE EDUCATION/TRAINING PROGRAM

## 2021-11-29 PROCEDURE — G8417 CALC BMI ABV UP PARAM F/U: HCPCS | Performed by: STUDENT IN AN ORGANIZED HEALTH CARE EDUCATION/TRAINING PROGRAM

## 2021-11-29 PROCEDURE — G8427 DOCREV CUR MEDS BY ELIG CLIN: HCPCS | Performed by: STUDENT IN AN ORGANIZED HEALTH CARE EDUCATION/TRAINING PROGRAM

## 2021-11-29 RX ORDER — CEFDINIR 300 MG/1
300 CAPSULE ORAL 2 TIMES DAILY
Qty: 14 CAPSULE | Refills: 0 | Status: SHIPPED | OUTPATIENT
Start: 2021-11-29 | End: 2021-12-06

## 2021-11-29 ASSESSMENT — ENCOUNTER SYMPTOMS
ABDOMINAL PAIN: 0
VOMITING: 0
COUGH: 0
NAUSEA: 0
RHINORRHEA: 0
SHORTNESS OF BREATH: 0

## 2021-11-29 NOTE — TELEPHONE ENCOUNTER
----- Message from [facility] sent at 11/29/2021  8:03 AM EST -----  Subject: Message to Provider    QUESTIONS  Information for Provider? Pt called in and stated would like to know if pt   should get a covid test stated has been having chills , body aches, fever   since Saturday and want to know what to do .  ---------------------------------------------------------------------------  --------------  CALL BACK INFO  What is the best way for the office to contact you? OK to leave message on   voicemail  Preferred Call Back Phone Number? [phone]  ---------------------------------------------------------------------------  --------------  SCRIPT ANSWERS  Relationship to Patient?  Self

## 2021-11-29 NOTE — TELEPHONE ENCOUNTER
Spoke w/ pt and [facility]

## 2021-11-29 NOTE — PROGRESS NOTES
Rut Smith (:  1965) is a 49 y.o. female,Established patient, here for evaluation of the following chief complaint(s):  Generalized Body Aches (started saturday night), Fever, and Chills         ASSESSMENT/PLAN:  1. Fever, unspecified fever cause  -     COVID-19 Ambulatory; Future  2. Urinary incontinence, unspecified type  -     POCT Urinalysis no Micro  -     Culture, Urine; Future  -     cefdinir (OMNICEF) 300 MG capsule; Take 1 capsule by mouth 2 times daily for 7 days, Disp-14 capsule, R-0Normal  3. Urinary tract infection without hematuria, site unspecified  -     cefdinir (OMNICEF) 300 MG capsule; Take 1 capsule by mouth 2 times daily for 7 days, Disp-14 capsule, R-0Normal    Suspect UTI given her incontinence, there are some nitrites on UA. Will send for culture and start treatment. No flank tenderness. COVID pending due to her fever. Discussed return and ER precautions. Advised hydration. Patient and or parent verbalized understanding    No follow-ups on file. Subjective   SUBJECTIVE/OBJECTIVE:  Chills, body aches, pain in upper back, fever  -Started Saturday afternoon  -Tmax of 101.7  -This AM she felt like her fever broke, but then it came back  -has had some dizziness  -no issues with breathing  -she had a few episodes of incontinence  -she feels very fatigued  -has taken some tylenol  -she also has DM and HTN  -she had a COVID infection about 1 ear ago        Review of Systems   Constitutional: Positive for chills, diaphoresis, fatigue and fever. HENT: Positive for congestion. Negative for rhinorrhea. Respiratory: Negative for cough and shortness of breath. Cardiovascular: Negative for chest pain and leg swelling. Gastrointestinal: Negative for abdominal pain, nausea and vomiting. Genitourinary: Negative for dysuria and hematuria. Urinary incontinence   Musculoskeletal: Positive for arthralgias and myalgias. Skin: Negative for rash and wound. Neurological: Negative for dizziness and light-headedness. Objective   Physical Exam  Vitals reviewed. Constitutional:       General: She is not in acute distress. HENT:      Head: Normocephalic and atraumatic. Eyes:      Extraocular Movements: Extraocular movements intact. Conjunctiva/sclera: Conjunctivae normal.   Cardiovascular:      Rate and Rhythm: Normal rate and regular rhythm. Pulmonary:      Effort: Pulmonary effort is normal.      Breath sounds: Normal breath sounds. No wheezing. Abdominal:      General: Abdomen is flat. There is no distension. Tenderness: There is no right CVA tenderness or left CVA tenderness. Musculoskeletal:         General: No tenderness or deformity. Neurological:      General: No focal deficit present. Mental Status: She is alert and oriented to person, place, and time. An electronic signature was used to authenticate this note.     --Thomas Raymundo MD

## 2021-11-30 ENCOUNTER — TELEPHONE (OUTPATIENT)
Dept: FAMILY MEDICINE CLINIC | Age: 56
End: 2021-11-30

## 2021-11-30 DIAGNOSIS — R50.9 FEVER, UNSPECIFIED FEVER CAUSE: ICD-10-CM

## 2021-11-30 NOTE — TELEPHONE ENCOUNTER
Keira from the lab calling to report that they could not run the covid test because the swab did not have a label on it. Do you want the patient to come back in to be swabbed again?

## 2021-12-01 ENCOUNTER — NURSE ONLY (OUTPATIENT)
Dept: FAMILY MEDICINE CLINIC | Age: 56
End: 2021-12-01

## 2021-12-01 DIAGNOSIS — R50.9 FEVER, UNSPECIFIED FEVER CAUSE: Primary | ICD-10-CM

## 2021-12-01 LAB
Lab: NORMAL
ORGANISM: ABNORMAL
QC PASS/FAIL: NORMAL
SARS-COV-2 RDRP RESP QL NAA+PROBE: NEGATIVE
URINE CULTURE, ROUTINE: ABNORMAL
URINE CULTURE, ROUTINE: ABNORMAL

## 2021-12-01 PROCEDURE — 87635 SARS-COV-2 COVID-19 AMP PRB: CPT | Performed by: STUDENT IN AN ORGANIZED HEALTH CARE EDUCATION/TRAINING PROGRAM

## 2021-12-01 PROCEDURE — 99999 PR OFFICE/OUTPT VISIT,PROCEDURE ONLY: CPT | Performed by: STUDENT IN AN ORGANIZED HEALTH CARE EDUCATION/TRAINING PROGRAM

## 2021-12-01 NOTE — LETTER
[address] [address]  Phone: [phone]  Fax: [phone], [address] [facility]        December 1, 2021     Patient: Alysia Bedolla   YOB: 1965   Date of Visit: 12/1/2021       To Whom It May Concern:    Claudia Evans was seen and evaluated in my clinic on Monday 11/29/21. Her Covid-19 test was not labeled properly and rejected from the lab. She was re-tested today 12/01/21 using the [facility] COVID-19 testing system and received a negative result. This specific test qualifies as a PCR or \"send out\" test per the testing guidelines. If you have any questions or concerns, please don't hesitate to call.     Sincerely,  Dr. Vernell Bryant MD        [facility], [address] [place] [address]

## 2021-12-14 DIAGNOSIS — G56.03 BILATERAL CARPAL TUNNEL SYNDROME: ICD-10-CM

## 2021-12-14 RX ORDER — MELOXICAM 7.5 MG/1
TABLET ORAL
Qty: 30 TABLET | Refills: 1 | Status: SHIPPED
Start: 2021-12-14 | End: 2022-02-17 | Stop reason: SDUPTHER

## 2021-12-15 ENCOUNTER — OFFICE VISIT (OUTPATIENT)
Dept: ORTHOPEDIC SURGERY | Age: 56
End: 2021-12-15
Payer: COMMERCIAL

## 2021-12-15 VITALS — BODY MASS INDEX: 36.96 KG/M2 | HEIGHT: 66 IN | WEIGHT: 230 LBS

## 2021-12-15 DIAGNOSIS — M75.01 ADHESIVE CAPSULITIS OF RIGHT SHOULDER: ICD-10-CM

## 2021-12-15 DIAGNOSIS — M25.511 ACUTE PAIN OF RIGHT SHOULDER: Primary | ICD-10-CM

## 2021-12-15 PROCEDURE — G8482 FLU IMMUNIZE ORDER/ADMIN: HCPCS | Performed by: ORTHOPAEDIC SURGERY

## 2021-12-15 PROCEDURE — 99213 OFFICE O/P EST LOW 20 MIN: CPT | Performed by: ORTHOPAEDIC SURGERY

## 2021-12-15 PROCEDURE — G8417 CALC BMI ABV UP PARAM F/U: HCPCS | Performed by: ORTHOPAEDIC SURGERY

## 2021-12-15 PROCEDURE — G8427 DOCREV CUR MEDS BY ELIG CLIN: HCPCS | Performed by: ORTHOPAEDIC SURGERY

## 2021-12-15 PROCEDURE — 1036F TOBACCO NON-USER: CPT | Performed by: ORTHOPAEDIC SURGERY

## 2021-12-15 PROCEDURE — 20605 DRAIN/INJ JOINT/BURSA W/O US: CPT | Performed by: ORTHOPAEDIC SURGERY

## 2021-12-15 PROCEDURE — 3017F COLORECTAL CA SCREEN DOC REV: CPT | Performed by: ORTHOPAEDIC SURGERY

## 2021-12-15 RX ORDER — TRIAMCINOLONE ACETONIDE 40 MG/ML
40 INJECTION, SUSPENSION INTRA-ARTICULAR; INTRAMUSCULAR ONCE
Status: COMPLETED | OUTPATIENT
Start: 2021-12-15 | End: 2021-12-15

## 2021-12-15 RX ORDER — LIDOCAINE HYDROCHLORIDE 10 MG/ML
4 INJECTION, SOLUTION INFILTRATION; PERINEURAL ONCE
Status: COMPLETED | OUTPATIENT
Start: 2021-12-15 | End: 2021-12-15

## 2021-12-15 RX ADMIN — LIDOCAINE HYDROCHLORIDE 4 ML: 10 INJECTION, SOLUTION INFILTRATION; PERINEURAL at 16:13

## 2021-12-15 RX ADMIN — TRIAMCINOLONE ACETONIDE 40 MG: 40 INJECTION, SUSPENSION INTRA-ARTICULAR; INTRAMUSCULAR at 16:13

## 2021-12-15 NOTE — PROGRESS NOTES
Follow Up Visit     Debra Person returns today for follow up visit regarding right shoulder pain secondary to adhesive capsulitis. Treatment thus far has included obtaining an MRI + cortisone injection performed last visit 09/15/2021. She reports symptoms are improved. She still has some pain, mainly over the [facility] joint today. REVIEW OF SYSTEMS:     Constitutional:  Negative for weight loss, fevers, chills, fatigue  Cardiovascular: Negative for chest pain, palpitations  Pulmonary: Negative for shortness of breath, labored breathing, cough  GI: negative for abdominal pain, nausea, vomitting   MSK: per HPI  Skin: negative for rash, open wounds    All other systems reviewed and are negative       Physical Exam:     Height: 5' 6\" (1.676 m), Weight: 230 lb (104.3 kg)    General: Alert and oriented x3, no acute distress  Cardiovascular/pulmonary: No labored breathing, peripheral perfusion intact  Musculoskeletal:    Exam of the shoulder shows range of motion approxione 50/45/T10. There is good strength with Akash test, no pain with Speed or [place]'s test.  Belly press test intact. There is mild pain with cross body adduction over the [facility] joint. Also tenderness over the [facility] joint which is fairly intense today. Controlled Substances Monitoring:      Imaging:  No new images. Previous MRI reviewed showing no evidence of rotator cuff pathology    Procedure Note:  Right Shoulder acromioclavicular joint steroid injection     The Right shoulder was identified as the injection site. The risk and benefits of a cortisone injection were explained and the patient consented to the injection. Under sterile conditions, the acromioclavicular joint was injected with a mixture of 40 mg of Kenalog,4cc  1% Lidocaine without complication. A sterile bandage was applied. Assessment: Right shoulder adhesive capsulitis which is improving, right shoulder AC joint pain      Plan:   Discussed her shoulder today.   I think she is improving from her prior visit where she was fairly stiff and painful, steroid shot helped significantly. She is having pain today mainly over her AC joint. We offered [facility] joint injection she was agreeable. She will continue with exercises namely stretching and working on strengthening. We will see her back in 3 months to reassess.     Jimbo Doss MD  Orthopaedic Surgery   12/15/21  8:02 AM

## 2021-12-20 ENCOUNTER — OFFICE VISIT (OUTPATIENT)
Dept: ORTHOPEDIC SURGERY | Age: 56
End: 2021-12-20
Payer: COMMERCIAL

## 2021-12-20 VITALS — HEIGHT: 66 IN | RESPIRATION RATE: 20 BRPM | WEIGHT: 230 LBS | BODY MASS INDEX: 36.96 KG/M2

## 2021-12-20 DIAGNOSIS — G56.03 BILATERAL CARPAL TUNNEL SYNDROME: Primary | ICD-10-CM

## 2021-12-20 PROCEDURE — 99212 OFFICE O/P EST SF 10 MIN: CPT | Performed by: ORTHOPAEDIC SURGERY

## 2021-12-20 PROCEDURE — G8417 CALC BMI ABV UP PARAM F/U: HCPCS | Performed by: ORTHOPAEDIC SURGERY

## 2021-12-20 PROCEDURE — G8482 FLU IMMUNIZE ORDER/ADMIN: HCPCS | Performed by: ORTHOPAEDIC SURGERY

## 2021-12-20 PROCEDURE — G8427 DOCREV CUR MEDS BY ELIG CLIN: HCPCS | Performed by: ORTHOPAEDIC SURGERY

## 2021-12-20 PROCEDURE — 3017F COLORECTAL CA SCREEN DOC REV: CPT | Performed by: ORTHOPAEDIC SURGERY

## 2021-12-20 PROCEDURE — 20526 THER INJECTION CARP TUNNEL: CPT | Performed by: ORTHOPAEDIC SURGERY

## 2021-12-20 PROCEDURE — 1036F TOBACCO NON-USER: CPT | Performed by: ORTHOPAEDIC SURGERY

## 2021-12-20 RX ORDER — BETAMETHASONE SODIUM PHOSPHATE AND BETAMETHASONE ACETATE 3; 3 MG/ML; MG/ML
12 INJECTION, SUSPENSION INTRA-ARTICULAR; INTRALESIONAL; INTRAMUSCULAR; SOFT TISSUE ONCE
Status: COMPLETED | OUTPATIENT
Start: 2021-12-20 | End: 2021-12-20

## 2021-12-20 RX ADMIN — BETAMETHASONE SODIUM PHOSPHATE AND BETAMETHASONE ACETATE 12 MG: 3; 3 INJECTION, SUSPENSION INTRA-ARTICULAR; INTRALESIONAL; INTRAMUSCULAR; SOFT TISSUE at 15:30

## 2021-12-20 NOTE — PROGRESS NOTES
Department of Orthopedic Surgery  Follow up      CHIEF COMPLAINT: Bilateral hand numbness and tingling    HISTORY OF PRESENT ILLNESS:                The patient is a 49 y.o. female who presents with bilateral hand numbness and tingling. The symptoms have been present for roughly 7 years. 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 She states that at the end of her work her hands are always numb. She states that she does have some nocturnal symptoms and wakes up with her hands asleep and then burning pain. She states that she was prescribed well future and within the past few months and this caused her hands and arms to swell up significantly. She states that after that her burning pain had significantly gotten worse. She is left-hand dominant. She also has pain at the base of the thumbs has been present for several years as well. EMG reviewed. Motor latencies for median nerve at the wrist.  Right wrist 5.36 left wrist 5.31 sensory latencies for the median nerve are 4.84 for the right and 5.42 for the left. Ulnar velocity for the right is 57 below the elbow 56 above the elbow. EMG portion shows decreased recruitment in the right and left abductor pollicis brevis. 3 months ago the patient had bilateral carpal tunnel cortisone injections. Patient states this completely resolved her symptoms until the last 2 weeks. She is requesting bilateral carpal tunnel cortisone injections at today's visit. She is not interested in surgical at this time. No new injuries or complaints.     Past Medical History:        Diagnosis Date    Bilateral carpal tunnel syndrome 2021    COVID-19 2020    Diabetes mellitus ([facility] [address]     Essential hypertension     Essential tremor     Generalized anxiety disorder 2020    Sleep apnea      Past Surgical History:        Procedure Laterality Date     SECTION      COLONOSCOPY      10 years ago    COLONOSCOPY N/A 3/29/2021 COLORECTAL CANCER SCREENING, NOT HIGH RISK performed by Bessie Rosa MD at [address] [facility]      Current Medications:   Current Facility-Administered Medications: betamethasone acetate-betamethasone sodium phosphate (CELESTONE) injection 12 mg, 12 mg, Intra-artICUlar, Once  Allergies:  Albuterol and Wellbutrin [bupropion]    Social History:   TOBACCO:   reports that she quit smoking about 3 years ago. Her smoking use included cigarettes. She started smoking about 23 years ago. She has a 15.00 pack-year smoking history. She has never used smokeless tobacco.  ETOH:   reports current alcohol use. DRUGS:   reports no history of drug use. ACTIVITIES OF DAILY LIVING:    OCCUPATION:    Family History:       Problem Relation Age of Onset    Hypothyroidism Mother     Hypotension Mother     Cancer Father         gallbladder  52yr    Hypotension Brother     Heart Attack Other         Uncle on father's side  age 44       REVIEW OF SYSTEMS:  CONSTITUTIONAL:  negative for  fevers, chills and sweats  EYES:  negative for  redness and icterus  HEENT:  negative for  sore throat and hoarseness  RESPIRATORY:  negative for  dry cough and dyspnea  CARDIOVASCULAR:  negative for  chest pain, orthopnea  MUSCULOSKELETAL: Thumb pain bilaterally  NEUROLOGICAL:  positive for numbness and tingling    PHYSICAL EXAM:    VITALS:  Resp 20   Ht 5' 6\" (1.676 m)   Wt 230 lb (104.3 kg)   LMP  (LMP Unknown)   BMI 37.12 kg/m²   CONSTITUTIONAL:  awake, alert, cooperative, no apparent distress, and appears stated age  EYES:  extra-ocular muscles intact  ENT:  normocepalic, without obvious abnormality  NECK:  supple, symmetrical, trachea midline  LUNGS:  no increased work of breathing  CARDIOVASCULAR:  no edema  NEUROLOGIC:  Awake, alert, oriented to name, place and time. Cranial nerves II-XII are grossly intact. Motor is 5 out of 5 bilaterally.     MUSCULOSKELETAL:    Right upper extremity  Skin is intact circumferentially. Negative Tinel's at the elbow. Positive Tinel's at the wrist positive Rain compression at the wrist.  Positive CMC grind. Positive Finkelstein's. No pain over any of the A1 pulleys. No active triggering. Neurovascularly intact grossly. Good cap refill to all digits. Left upper extremity  Skin is intact circumferentially. Negative Tinel's at the elbow. Positive Tinel's at the wrist positive Rain compression at the wrist.  Negative CMC grind. Positive Finkelstein's. No pain over any of the A1 pulleys. No active triggering. Neurovascularly intact grossly. Good cap refill to all digits. DATA:    CBC:   Lab Results   Component Value Date    WBC 6.1 08/16/2021    RBC 4.68 08/16/2021    HGB 13.5 08/16/2021    HCT 41.1 08/16/2021    MCV 87.8 08/16/2021    MCH 28.8 08/16/2021    MCHC 32.8 08/16/2021    RDW 13.6 08/16/2021     08/16/2021    MPV 10.0 08/16/2021     PT/INR:    Lab Results   Component Value Date    PROTIME 11.1 03/04/2020    INR 1.0 03/04/2020         IMPRESSION:  · Bilateral carpal tunnel  · Diabetes mellitus  · Essential hypertension  · Anxiety    PLAN:    Discussed findings with the patient. Discussed conservative and surgical management with patient. Patient is not interested in any type of surgical management at this time. Did discuss this in detail with the patient. Patient would like to repeat injections at today's visit. Patient consented and this was tolerated well. All questions answered. Procedure Note Carpal Tunnel Injection    The bilateral carpal tunnel was identified as the injection site. The risk and benefits of a cortisone injection were explained and the patient consented to the injection. Under sterile conditions, the carpal canal was injected with a mixture of 1 mL of 1% Lidocaine and 1 mL of 6 mg/mL Betamethasone without complication. A sterile bandage was applied.     I have seen and evaluated the patient and agree with the above assessment and plan on today's visit. I have performed the key components of the history and physical examination with significant findings of bilateral hand numbness and tingling responsive to cortisone injections. I concur with the findings and plan as documented.     Roseline Polanco MD  12/20/2021

## 2021-12-28 ENCOUNTER — OFFICE VISIT (OUTPATIENT)
Dept: FAMILY MEDICINE CLINIC | Age: 56
End: 2021-12-28
Payer: COMMERCIAL

## 2021-12-28 VITALS
HEART RATE: 81 BPM | HEIGHT: 66 IN | BODY MASS INDEX: 36.96 KG/M2 | TEMPERATURE: 97.3 F | OXYGEN SATURATION: 98 % | RESPIRATION RATE: 16 BRPM | WEIGHT: 230 LBS

## 2021-12-28 DIAGNOSIS — E11.65 TYPE 2 DIABETES MELLITUS WITH HYPERGLYCEMIA, WITHOUT LONG-TERM CURRENT USE OF INSULIN (HCC): ICD-10-CM

## 2021-12-28 DIAGNOSIS — J01.40 ACUTE NON-RECURRENT PANSINUSITIS: ICD-10-CM

## 2021-12-28 DIAGNOSIS — Z79.899 LONG TERM CURRENT USE OF THERAPEUTIC DRUG: ICD-10-CM

## 2021-12-28 DIAGNOSIS — U07.1 COVID-19: ICD-10-CM

## 2021-12-28 DIAGNOSIS — Z82.49 FAMILY HX OF ISCHEM HEART DIS AND OTH DIS OF THE CIRC SYS: ICD-10-CM

## 2021-12-28 DIAGNOSIS — E55.9 VITAMIN D DEFICIENCY: ICD-10-CM

## 2021-12-28 DIAGNOSIS — J06.9 VIRAL URI: Primary | ICD-10-CM

## 2021-12-28 DIAGNOSIS — R53.83 OTHER FATIGUE: ICD-10-CM

## 2021-12-28 LAB
ALBUMIN SERPL-MCNC: 4.5 G/DL (ref 3.5–5.2)
ALP BLD-CCNC: 77 U/L (ref 35–104)
ALT SERPL-CCNC: 28 U/L (ref 0–32)
ANION GAP SERPL CALCULATED.3IONS-SCNC: 13 MMOL/L (ref 7–16)
AST SERPL-CCNC: 23 U/L (ref 0–31)
BASOPHILS ABSOLUTE: 0.03 E9/L (ref 0–0.2)
BASOPHILS RELATIVE PERCENT: 0.5 % (ref 0–2)
BILIRUB SERPL-MCNC: 0.6 MG/DL (ref 0–1.2)
BUN BLDV-MCNC: 14 MG/DL (ref 6–20)
CALCIUM SERPL-MCNC: 9.5 MG/DL (ref 8.6–10.2)
CHLORIDE BLD-SCNC: 97 MMOL/L (ref 98–107)
CHOLESTEROL, FASTING: 143 MG/DL (ref 0–199)
CO2: 27 MMOL/L (ref 22–29)
CREAT SERPL-MCNC: 0.8 MG/DL (ref 0.5–1)
EOSINOPHILS ABSOLUTE: 0.03 E9/L (ref 0.05–0.5)
EOSINOPHILS RELATIVE PERCENT: 0.5 % (ref 0–6)
FOLATE: 14.9 NG/ML (ref 4.8–24.2)
GFR AFRICAN AMERICAN: >60
GFR NON-AFRICAN AMERICAN: >60 ML/MIN/1.73
GLUCOSE BLD-MCNC: 133 MG/DL (ref 74–99)
HBA1C MFR BLD: 6.8 % (ref 4–5.6)
HCT VFR BLD CALC: 45 % (ref 34–48)
HDLC SERPL-MCNC: 57 MG/DL
HEMOGLOBIN: 14.8 G/DL (ref 11.5–15.5)
IMMATURE GRANULOCYTES #: 0.01 E9/L
IMMATURE GRANULOCYTES %: 0.2 % (ref 0–5)
LDL CHOLESTEROL CALCULATED: 71 MG/DL (ref 0–99)
LYMPHOCYTES ABSOLUTE: 1.44 E9/L (ref 1.5–4)
LYMPHOCYTES RELATIVE PERCENT: 24.4 % (ref 20–42)
Lab: ABNORMAL
MAGNESIUM: 2.1 MG/DL (ref 1.6–2.6)
MCH RBC QN AUTO: 28.4 PG (ref 26–35)
MCHC RBC AUTO-ENTMCNC: 32.9 % (ref 32–34.5)
MCV RBC AUTO: 86.4 FL (ref 80–99.9)
MONOCYTES ABSOLUTE: 0.64 E9/L (ref 0.1–0.95)
MONOCYTES RELATIVE PERCENT: 10.8 % (ref 2–12)
NEUTROPHILS ABSOLUTE: 3.76 E9/L (ref 1.8–7.3)
NEUTROPHILS RELATIVE PERCENT: 63.6 % (ref 43–80)
PDW BLD-RTO: 13 FL (ref 11.5–15)
PERFORMING INSTRUMENT: ABNORMAL
PLATELET # BLD: 154 E9/L (ref 130–450)
PMV BLD AUTO: 10.6 FL (ref 7–12)
POTASSIUM SERPL-SCNC: 3.7 MMOL/L (ref 3.5–5)
QC PASS/FAIL: ABNORMAL
RBC # BLD: 5.21 E12/L (ref 3.5–5.5)
SARS-COV-2, POC: DETECTED
SODIUM BLD-SCNC: 137 MMOL/L (ref 132–146)
TOTAL PROTEIN: 6.8 G/DL (ref 6.4–8.3)
TRIGLYCERIDE, FASTING: 74 MG/DL (ref 0–149)
TSH SERPL DL<=0.05 MIU/L-ACNC: 0.6 UIU/ML (ref 0.27–4.2)
VITAMIN B-12: 417 PG/ML (ref 211–946)
VITAMIN D 25-HYDROXY: 33 NG/ML (ref 30–100)
VLDLC SERPL CALC-MCNC: 15 MG/DL
WBC # BLD: 5.9 E9/L (ref 4.5–11.5)

## 2021-12-28 PROCEDURE — 1036F TOBACCO NON-USER: CPT | Performed by: FAMILY MEDICINE

## 2021-12-28 PROCEDURE — G8417 CALC BMI ABV UP PARAM F/U: HCPCS | Performed by: FAMILY MEDICINE

## 2021-12-28 PROCEDURE — G8482 FLU IMMUNIZE ORDER/ADMIN: HCPCS | Performed by: FAMILY MEDICINE

## 2021-12-28 PROCEDURE — 87426 SARSCOV CORONAVIRUS AG IA: CPT | Performed by: FAMILY MEDICINE

## 2021-12-28 PROCEDURE — 3017F COLORECTAL CA SCREEN DOC REV: CPT | Performed by: FAMILY MEDICINE

## 2021-12-28 PROCEDURE — G8427 DOCREV CUR MEDS BY ELIG CLIN: HCPCS | Performed by: FAMILY MEDICINE

## 2021-12-28 PROCEDURE — 99213 OFFICE O/P EST LOW 20 MIN: CPT | Performed by: FAMILY MEDICINE

## 2021-12-28 RX ORDER — PREDNISONE 10 MG/1
TABLET ORAL
Qty: 18 TABLET | Refills: 0 | Status: SHIPPED | OUTPATIENT
Start: 2021-12-28 | End: 2022-01-06

## 2021-12-28 RX ORDER — AZITHROMYCIN 250 MG/1
250 TABLET, FILM COATED ORAL SEE ADMIN INSTRUCTIONS
Qty: 6 TABLET | Refills: 0 | Status: SHIPPED | OUTPATIENT
Start: 2021-12-28 | End: 2022-01-02

## 2021-12-28 NOTE — LETTER
[address] [address]  Phone: [phone]  Fax: [phone] [facility],         December 28, 2021     Patient: Elizabeth Vyas   YOB: 1965   Date of Visit: 12/28/2021       To Whom it May Concern:    Elizabeth Vyas was seen in my clinic on 12/28/2021. It is my medical opinion that Janene Boas should self-quarantine away from work and the general public as they tested positive for COVID-19 in our office today. Return to work with no retesting should be followed if meets these 3 criteria as outlined by CDC/ODH:     a. No fever without the use of fever reducers for 24 hours  b. Improvement in symptoms  c. At least 10 days since the onset of symptoms (1/6/22). If you have any questions or concerns, please don't hesitate to call.     Sincerely,         Dolly Uriostegui, DO

## 2021-12-28 NOTE — LETTER
[address] [address]  Phone: [phone]  Fax: [phone] [facility],     December 28, 2021     Akua Aguilar, [address] [facility] [address]    Patient: Lupillo Duvall   MR Number: [id number]   YOB: 1965   Date of Visit: 12/28/2021       Dear Akua Aguilar: Thank you for referring Lupillo Duvall to me for evaluation/treatment. Below are the relevant portions of my assessment and plan of care. If you have questions, please do not hesitate to call me. I look forward to following Rina BAILEY along with you.     Sincerely,      Tammy Cm, DO

## 2021-12-28 NOTE — PROGRESS NOTES
21  Ali Sera : 1965 Sex: female  Age: 49 y.o. Assessment and Plan:  Rina BAILEY was seen today for fever, nasal congestion, fatigue, cough, generalized body aches, nausea, headache and pharyngitis. Diagnoses and all orders for this visit:    Viral URI  -     POCT COVID-19, Antigen  -     predniSONE (DELTASONE) 10 MG tablet; Take 3 tablets by mouth daily for 3 days, THEN 2 tablets daily for 3 days, THEN 1 tablet daily for 3 days. COVID-19  -     predniSONE (DELTASONE) 10 MG tablet; Take 3 tablets by mouth daily for 3 days, THEN 2 tablets daily for 3 days, THEN 1 tablet daily for 3 days. Acute non-recurrent pansinusitis  -     azithromycin (ZITHROMAX) 250 MG tablet; Take 1 tablet by mouth See Admin Instructions for 5 days 500mg on day 1 followed by 250mg on days 2 - 5  -     predniSONE (DELTASONE) 10 MG tablet; Take 3 tablets by mouth daily for 3 days, THEN 2 tablets daily for 3 days, THEN 1 tablet daily for 3 days. This patient tested positive for Covid with rapid antigen test.  Is to use supportive care including Tylenol, fluids, rest, Mucinex DM. Sinusitis will be treated with azithromycin is to isolate for the next 10 days. If complaints worsen in any way, present back to the office or to the emergency room. Return 3 to 5 days if not improved. .    Chief Complaint   Patient presents with    Fever     onset      Nasal Congestion    Fatigue    Cough    Generalized Body Aches    Nausea    Headache    Pharyngitis       Congestion, pressure, drainage, facial tenderness, mild headache, fever, fatigue, cough, myalgias, nausea, sore throat onset 3days ag o. Denies  vomiting, decreased oral intake. Denies other GI or  complaints. OTC treatments minimally effective. Review of Systems   Constitutional: Positive for fatigue and fever. Negative for appetite change and unexpected weight change.    HENT: Positive for congestion, postnasal drip, sinus pressure and sore throat. Negative for ear pain, hearing loss, sinus pain and trouble swallowing. Eyes: Negative for photophobia, pain, discharge and redness. Respiratory: Positive for cough. Negative for chest tightness and shortness of breath. Cardiovascular: Negative for chest pain, palpitations and leg swelling. Gastrointestinal: Positive for nausea. Negative for abdominal pain, blood in stool, diarrhea and vomiting. Endocrine: Negative. Genitourinary: Negative for dysuria, flank pain, frequency and hematuria. Musculoskeletal: Positive for myalgias. Negative for arthralgias, back pain and joint swelling. Skin: Negative. Allergic/Immunologic: Negative. Neurological: Positive for headaches. Negative for dizziness, seizures, syncope, weakness, light-headedness and numbness. Hematological: Negative for adenopathy. Does not bruise/bleed easily. Psychiatric/Behavioral: Negative. Current Outpatient Medications:     azithromycin (ZITHROMAX) 250 MG tablet, Take 1 tablet by mouth See Admin Instructions for 5 days 500mg on day 1 followed by 250mg on days 2 - 5, Disp: 6 tablet, Rfl: 0    predniSONE (DELTASONE) 10 MG tablet, Take 3 tablets by mouth daily for 3 days, THEN 2 tablets daily for 3 days, THEN 1 tablet daily for 3 days. , Disp: 18 tablet, Rfl: 0    meloxicam (MOBIC) 7.5 MG tablet, 1 tablet daily as needed for hand pain. Take with food. , Disp: 30 tablet, Rfl: 1    vitamin D (ERGOCALCIFEROL) 1.25 MG (10148 UT) CAPS capsule, Take 1 capsule by mouth once a week, Disp: 12 capsule, Rfl: 0    losartan (COZAAR) 25 MG tablet, Take 1 tablet by mouth daily, Disp: 90 tablet, Rfl: 0    hydroCHLOROthiazide (MICROZIDE) 12.5 MG capsule, Take 1 capsule by mouth every morning, Disp: 90 capsule, Rfl: 0    omeprazole (PRILOSEC) 20 MG delayed release capsule, Take 1 capsule by mouth every morning (before breakfast), Disp: 30 capsule, Rfl: 2    metFORMIN (GLUCOPHAGE) 500 MG tablet, Take 1 tablet by mouth daily (with breakfast), Disp: 30 tablet, Rfl: 5    sertraline (ZOLOFT) 100 MG tablet, Take 100 mg by mouth daily , Disp: , Rfl:     hydrOXYzine (VISTARIL) 25 MG capsule, Take 25 mg by mouth 3 times daily as needed for Anxiety , Disp: , Rfl:     cyclobenzaprine (FLEXERIL) 10 MG tablet, Take 1 tablet by mouth 3 times daily as needed for Muscle spasms, Disp: 60 tablet, Rfl: 2  Allergies   Allergen Reactions    Albuterol      Made her feel funny     Wellbutrin [Bupropion] Swelling and Other (See Comments)     Upper extremity arthralgias       Past Medical History:   Diagnosis Date    Bilateral carpal tunnel syndrome 2021    COVID-19 2020    Diabetes mellitus ([address]     Essential hypertension     Essential tremor     Generalized anxiety disorder 2020    Sleep apnea      Past Surgical History:   Procedure Laterality Date     SECTION      COLONOSCOPY      10 years ago    COLONOSCOPY N/A 3/29/2021    COLORECTAL CANCER SCREENING, NOT HIGH RISK performed by Stephon Lovett MD at [address] [place] [address]      Family History   Problem Relation Age of Onset    Hypothyroidism Mother     Hypotension Mother     Cancer Father         gallbladder  52yr    Hypotension Brother     Heart Attack Other         Uncle on father's side  age 44     Social History     Socioeconomic History    Marital status:      Spouse name: Not on file    Number of children: Not on file    Years of education: Not on file    Highest education level: Not on file   Occupational History    Not on file   Tobacco Use    Smoking status: Former Smoker     Packs/day: 0.75     Years: 20.00     Pack years: 15.00     Types: Cigarettes     Start date: 1998     Quit date: 3/4/2018     Years since quitting: 3.8    Smokeless tobacco: Never Used   Substance and Sexual Activity    Alcohol use:  Yes    Drug use: Never    Sexual activity: Not on file   Other Topics Concern    Not on file   Social History Narrative    Not on file     Social Determinants of Health     Financial Resource Strain: Low Risk     Difficulty of Paying Living Expenses: Not hard at all   Food Insecurity: No Food Insecurity    Worried About Running Out of Food in the Last Year: Never true    [address] in the Last Year: Never true   Transportation Needs:     Lack of Transportation (Medical): Not on file    Lack of Transportation (Non-Medical): Not on file   Physical Activity:     Days of Exercise per Week: Not on file    Minutes of Exercise per Session: Not on file   Stress:     Feeling of Stress : Not on file   Social Connections:     Frequency of Communication with Friends and Family: Not on file    Frequency of Social Gatherings with Friends and Family: Not on file    Attends Jewish Services: Not on file    Active Member of [address] [place] [address] or Organizations: Not on file    Attends Club or Organization Meetings: Not on file    Marital Status: Not on file   Intimate Partner Violence:     Fear of Current or Ex-Partner: Not on file    Emotionally Abused: Not on file    Physically Abused: Not on file    Sexually Abused: Not on file   Housing Stability:     Unable to Pay for Housing in the Last Year: Not on file    Number of [place] in the Last Year: Not on file    Unstable Housing in the Last Year: Not on file       Vitals:    12/28/21 1339   Pulse: 81   Resp: 16   Temp: 97.3 °F (36.3 °C)   TempSrc: Temporal   SpO2: 98%   Weight: 230 lb (104.3 kg)   Height: 5' 6\" (1.676 m)       Physical Exam  Vitals and nursing note reviewed. Constitutional:       Appearance: She is well-developed. HENT:      Head: Atraumatic. Right Ear: External ear normal.      Left Ear: External ear normal.      Nose: Congestion present. Mouth/Throat:      Pharynx: Posterior oropharyngeal erythema present. No oropharyngeal exudate.    Eyes:      Conjunctiva/sclera: Conjunctivae normal.      Pupils: Pupils are equal, round, and reactive to light. Neck:      Thyroid: No thyromegaly. Trachea: No tracheal deviation. Cardiovascular:      Rate and Rhythm: Normal rate and regular rhythm. Heart sounds: No murmur heard. No friction rub. No gallop. Pulmonary:      Effort: Pulmonary effort is normal. No respiratory distress. Breath sounds: Normal breath sounds. Abdominal:      General: Bowel sounds are normal.      Palpations: Abdomen is soft. Musculoskeletal:         General: No tenderness or deformity. Normal range of motion. Cervical back: Normal range of motion and neck supple. Lymphadenopathy:      Cervical: No cervical adenopathy. Skin:     General: Skin is warm and dry. Capillary Refill: Capillary refill takes less than 2 seconds. Findings: No rash. Neurological:      Mental Status: She is alert and oriented to person, place, and time. Sensory: No sensory deficit. Motor: No abnormal muscle tone.       Coordination: Coordination normal.      Deep Tendon Reflexes: Reflexes normal.             Seen By:  Noemy Schultz DO

## 2021-12-28 NOTE — Clinical Note
[address] [address]  Phone: [phone]  Fax: [phone] [facility], DO        December 28, 2021     Patient: Samantha Peguero   YOB: 1965   Date of Visit: 12/28/2021       To Whom It May Concern: It is my medical opinion that Samantha Peguero {Work release (duty restriction):14950}. If you have any questions or concerns, please don't hesitate to call.     Sincerely,        Caesar Aceves, DO

## 2021-12-29 ENCOUNTER — PATIENT MESSAGE (OUTPATIENT)
Dept: FAMILY MEDICINE CLINIC | Age: 56
End: 2021-12-29

## 2021-12-29 RX ORDER — CYCLOBENZAPRINE HCL 10 MG
10 TABLET ORAL 3 TIMES DAILY PRN
Qty: 60 TABLET | Refills: 2 | Status: SHIPPED
Start: 2021-12-29 | End: 2022-02-17 | Stop reason: SDUPTHER

## 2021-12-29 NOTE — TELEPHONE ENCOUNTER
From: [facility]  To: Dr. Mariano Barahona  Sent: 2021 9:06 AM EST  Subject: Layvonne Scriver    I began using my flexerall but can not renew it since my prescription has . [facility] [address] [place] [address] [facility] prescription.  And if possible, before 21 since my deductible this year has been met    Thank you

## 2021-12-30 ENCOUNTER — TELEPHONE (OUTPATIENT)
Dept: FAMILY MEDICINE CLINIC | Age: 56
End: 2021-12-30

## 2021-12-30 ASSESSMENT — ENCOUNTER SYMPTOMS
NAUSEA: 1
SINUS PAIN: 0
BACK PAIN: 0
SHORTNESS OF BREATH: 0
ABDOMINAL PAIN: 0
VOMITING: 0
EYE DISCHARGE: 0
PHOTOPHOBIA: 0
ALLERGIC/IMMUNOLOGIC NEGATIVE: 1
COUGH: 1
DIARRHEA: 0
SINUS PRESSURE: 1
CHEST TIGHTNESS: 0
SORE THROAT: 1
EYE REDNESS: 0
BLOOD IN STOOL: 0
TROUBLE SWALLOWING: 0
EYE PAIN: 0

## 2021-12-30 NOTE — TELEPHONE ENCOUNTER
Please let the patient know that blood work results showed    Sugar was mildly elevated. Hemoglobin A1c is a measure 3-month sugar control was improved to 6.8. Recommend to continue present medications and continue to watch diet from carbohydrates and sugars    Cholesterol levels were fair. Given the diabetes diagnosis still recommended for statin medication.   Otherwise recommend to continue to watch diet and exercise    Vitamin D D levels were back in normal range but still in the low end of normal and improved when compared to previous and would continue present supplement    Vitamin M76 and folic acid levels were normal    Thyroid levels were normal    Blood counts were normal    Chloride levels were borderline low of uncertain cause or significance possibly related to medications    Other electrolytes, liver functions, and kidney function values were normal    Thanks

## 2021-12-30 NOTE — LETTER
[address] [place] [address]  Phone: [phone]  Fax: [phone]    Jesus Bonner MD        January 3, 2022     [address] [facility] [address]      Dear Adryan BAILEY:    Below are the results from your recent visit:    Resulted Orders   Vitamin B12 & Folate   Result Value Ref Range    Vitamin B-12 417 211 - 946 pg/mL    Folate 14.9 4.8 - 24.2 ng/mL   CBC Auto Differential   Result Value Ref Range    WBC 5.9 4.5 - 11.5 E9/L    RBC 5.21 3.50 - 5.50 E12/L    Hemoglobin 14.8 11.5 - 15.5 g/dL    Hematocrit 45.0 34.0 - 48.0 %    MCV 86.4 80.0 - 99.9 fL    MCH 28.4 26.0 - 35.0 pg    MCHC 32.9 32.0 - 34.5 %    RDW 13.0 11.5 - 15.0 fL    Platelets 675 131 - 020 E9/L    MPV 10.6 7.0 - 12.0 fL    Neutrophils % 63.6 43.0 - 80.0 %    Immature Granulocytes % 0.2 0.0 - 5.0 %    Lymphocytes % 24.4 20.0 - 42.0 %    Monocytes % 10.8 2.0 - 12.0 %    Eosinophils % 0.5 0.0 - 6.0 %    Basophils % 0.5 0.0 - 2.0 %    Neutrophils Absolute 3.76 1.80 - 7.30 E9/L    Immature Granulocytes # 0.01 E9/L    Lymphocytes Absolute 1.44 (L) 1.50 - 4.00 E9/L    Monocytes Absolute 0.64 0.10 - 0.95 E9/L    Eosinophils Absolute 0.03 (L) 0.05 - 0.50 E9/L    Basophils Absolute 0.03 0.00 - 0.20 E9/L   TSH without Reflex   Result Value Ref Range    TSH 0.604 0.270 - 4.200 uIU/mL   Vitamin D 25 Hydroxy   Result Value Ref Range    Vit D, 25-Hydroxy 33 30 - 100 ng/mL      Comment:      <20 ng/mL. ........... Thurl Mutton Deficient  20-30 ng/mL. ......... Thurl Mutton Insufficient   ng/mL. ........ Thurl Mutton Sufficient  >100 ng/mL. .......... Thurl Mutton Toxic     Hemoglobin A1C   Result Value Ref Range    Hemoglobin A1C 6.8 (H) 4.0 - 5.6 %   Lipid, Fasting   Result Value Ref Range    Cholesterol, Fasting 143 0 - 199 mg/dL    Triglyceride, Fasting 74 0 - 149 mg/dL    HDL 57 >40 mg/dL    LDL Calculated 71 0 - 99 mg/dL    VLDL Cholesterol Calculated 15 mg/dL   Magnesium   Result Value Ref Range    Magnesium 2.1 1.6 - 2.6 mg/dL   Comprehensive Metabolic Panel   Result Value Ref Range    Sodium 137 132 - 146 mmol/L    Potassium 3.7 3.5 - 5.0 mmol/L    Chloride 97 (L) 98 - 107 mmol/L    CO2 27 22 - 29 mmol/L    Anion Gap 13 7 - 16 mmol/L    Glucose 133 (H) 74 - 99 mg/dL    BUN 14 6 - 20 mg/dL    CREATININE 0.8 0.5 - 1.0 mg/dL    GFR Non-African American >60 >=60 mL/min/1.73      Comment:      Chronic Kidney Disease: less than 60 ml/min/1.73 sq.m. Kidney Failure: less than 15 ml/min/1.73 sq.m. Results valid for patients 18 years and older. GFR  >60     Calcium 9.5 8.6 - 10.2 mg/dL    Total Protein 6.8 6.4 - 8.3 g/dL    Albumin 4.5 3.5 - 5.2 g/dL    Total Bilirubin 0.6 0.0 - 1.2 mg/dL    Alkaline Phosphatase 77 35 - 104 U/L    ALT 28 0 - 32 U/L    AST 23 0 - 31 U/L                         The test results show:    Sugar was mildly elevated. Hemoglobin A1c is a measure 3-month sugar control was improved to 6.8. Recommend to continue present medications and continue to watch diet from carbohydrates and sugars     Cholesterol levels were fair. Given the diabetes diagnosis still recommended for statin medication. Otherwise recommend to continue to watch diet and exercise     Vitamin D D levels were back in normal range but still in the low end of normal and improved when compared to previous and would continue present supplement     Vitamin T43 and folic acid levels were normal     Thyroid levels were normal     Blood counts were normal     Chloride levels were borderline low of uncertain cause or significance possibly related to medications     Other electrolytes, liver functions, and kidney function values were normal      If you have any questions or concerns, please don't hesitate to call.     Sincerely,        Rich Weston MD

## 2022-01-04 ENCOUNTER — TELEPHONE (OUTPATIENT)
Dept: FAMILY MEDICINE CLINIC | Age: 57
End: 2022-01-04

## 2022-01-04 NOTE — Clinical Note
[address] [place] [address]  Phone: [phone]  Fax: [phone]    Sepideh Dillon MD        January 4, 2022     Patient: Zi Ramos   YOB: 1965   Date of Visit: 1/4/2022       To Whom It May Concern: It is my medical opinion that Zi Mom {Work release (duty restriction):10155}. If you have any questions or concerns, please don't hesitate to call.     Sincerely,        Sepideh Dillon MD

## 2022-01-04 NOTE — LETTER
[address] [place] [address]  Phone: [phone]  Fax: [phone]    Elisa Rivero MD        January 4, 2022     Patient: Arslan Collier   YOB: 1965   Date of Visit: 1/4/2022       To Whom it May Concern:    Arslan Collier was seen in my clinic on 12/28/2021. She may return to work on 1/5/2022. .    If you have any questions or concerns, please don't hesitate to call.     Sincerely,         Elisa Rivero MD

## 2022-01-04 NOTE — TELEPHONE ENCOUNTER
Spoke with pt and she is asking if she could have a letter to be off for 1/3/22 and 1/4/22, return to work on 1/5/22. She states she was not feeling much better after testing positive for covid and needed to take those days off.   ATT:  [employer]  F# [phone]

## 2022-01-16 ENCOUNTER — TELEPHONE (OUTPATIENT)
Dept: CARDIOLOGY CLINIC | Age: 57
End: 2022-01-16

## 2022-01-18 ENCOUNTER — TELEPHONE (OUTPATIENT)
Dept: SLEEP CENTER | Age: 57
End: 2022-01-18

## 2022-02-17 ENCOUNTER — OFFICE VISIT (OUTPATIENT)
Dept: FAMILY MEDICINE CLINIC | Age: 57
End: 2022-02-17
Payer: COMMERCIAL

## 2022-02-17 VITALS
HEIGHT: 66 IN | SYSTOLIC BLOOD PRESSURE: 124 MMHG | TEMPERATURE: 97.9 F | BODY MASS INDEX: 36.64 KG/M2 | OXYGEN SATURATION: 98 % | DIASTOLIC BLOOD PRESSURE: 80 MMHG | WEIGHT: 228 LBS | HEART RATE: 86 BPM

## 2022-02-17 DIAGNOSIS — G47.33 OBSTRUCTIVE SLEEP APNEA SYNDROME: ICD-10-CM

## 2022-02-17 DIAGNOSIS — F41.1 GENERALIZED ANXIETY DISORDER: ICD-10-CM

## 2022-02-17 DIAGNOSIS — K21.9 GASTROESOPHAGEAL REFLUX DISEASE WITHOUT ESOPHAGITIS: ICD-10-CM

## 2022-02-17 DIAGNOSIS — G56.03 BILATERAL CARPAL TUNNEL SYNDROME: ICD-10-CM

## 2022-02-17 DIAGNOSIS — Z12.31 SCREENING MAMMOGRAM FOR BREAST CANCER: ICD-10-CM

## 2022-02-17 DIAGNOSIS — I10 ESSENTIAL HYPERTENSION: Primary | ICD-10-CM

## 2022-02-17 DIAGNOSIS — E55.9 VITAMIN D DEFICIENCY: ICD-10-CM

## 2022-02-17 DIAGNOSIS — L65.9 ALOPECIA: ICD-10-CM

## 2022-02-17 DIAGNOSIS — E66.09 CLASS 2 OBESITY DUE TO EXCESS CALORIES WITHOUT SERIOUS COMORBIDITY WITH BODY MASS INDEX (BMI) OF 36.0 TO 36.9 IN ADULT: ICD-10-CM

## 2022-02-17 DIAGNOSIS — E11.65 TYPE 2 DIABETES MELLITUS WITH HYPERGLYCEMIA, WITHOUT LONG-TERM CURRENT USE OF INSULIN (HCC): ICD-10-CM

## 2022-02-17 DIAGNOSIS — E78.2 MIXED HYPERLIPIDEMIA: ICD-10-CM

## 2022-02-17 PROCEDURE — 99214 OFFICE O/P EST MOD 30 MIN: CPT | Performed by: INTERNAL MEDICINE

## 2022-02-17 PROCEDURE — G8427 DOCREV CUR MEDS BY ELIG CLIN: HCPCS | Performed by: INTERNAL MEDICINE

## 2022-02-17 PROCEDURE — G8417 CALC BMI ABV UP PARAM F/U: HCPCS | Performed by: INTERNAL MEDICINE

## 2022-02-17 PROCEDURE — 1036F TOBACCO NON-USER: CPT | Performed by: INTERNAL MEDICINE

## 2022-02-17 PROCEDURE — G8482 FLU IMMUNIZE ORDER/ADMIN: HCPCS | Performed by: INTERNAL MEDICINE

## 2022-02-17 PROCEDURE — 3046F HEMOGLOBIN A1C LEVEL >9.0%: CPT | Performed by: INTERNAL MEDICINE

## 2022-02-17 PROCEDURE — 3017F COLORECTAL CA SCREEN DOC REV: CPT | Performed by: INTERNAL MEDICINE

## 2022-02-17 PROCEDURE — 2022F DILAT RTA XM EVC RTNOPTHY: CPT | Performed by: INTERNAL MEDICINE

## 2022-02-17 RX ORDER — CYCLOBENZAPRINE HCL 10 MG
10 TABLET ORAL 3 TIMES DAILY PRN
Qty: 60 TABLET | Refills: 2 | Status: SHIPPED
Start: 2022-02-17 | End: 2022-09-14 | Stop reason: SDUPTHER

## 2022-02-17 RX ORDER — LOSARTAN POTASSIUM 25 MG/1
25 TABLET ORAL DAILY
Qty: 90 TABLET | Refills: 1 | Status: SHIPPED
Start: 2022-02-17 | End: 2022-09-14 | Stop reason: SDUPTHER

## 2022-02-17 RX ORDER — MELOXICAM 7.5 MG/1
TABLET ORAL
Qty: 30 TABLET | Refills: 2 | Status: SHIPPED
Start: 2022-02-17 | End: 2022-05-31 | Stop reason: SDUPTHER

## 2022-02-17 RX ORDER — HYDROCHLOROTHIAZIDE 12.5 MG/1
12.5 CAPSULE, GELATIN COATED ORAL EVERY MORNING
Qty: 90 CAPSULE | Refills: 1 | Status: SHIPPED
Start: 2022-02-17 | End: 2022-09-14 | Stop reason: SDUPTHER

## 2022-02-17 RX ORDER — ERGOCALCIFEROL 1.25 MG/1
50000 CAPSULE ORAL WEEKLY
Qty: 12 CAPSULE | Refills: 0 | Status: SHIPPED
Start: 2022-02-17 | End: 2022-09-14 | Stop reason: SDUPTHER

## 2022-02-17 RX ORDER — ATORVASTATIN CALCIUM 20 MG/1
20 TABLET, FILM COATED ORAL DAILY
Qty: 30 TABLET | Refills: 5 | Status: SHIPPED
Start: 2022-02-17 | End: 2022-09-14 | Stop reason: SDUPTHER

## 2022-02-17 ASSESSMENT — ENCOUNTER SYMPTOMS
SHORTNESS OF BREATH: 0
SORE THROAT: 0
EYE PAIN: 0
DIARRHEA: 0
RHINORRHEA: 0
BLOOD IN STOOL: 0
VOMITING: 0
CONSTIPATION: 0
ABDOMINAL PAIN: 0
COUGH: 0
NAUSEA: 0
CHEST TIGHTNESS: 0

## 2022-02-17 NOTE — PROGRESS NOTES
[address]  [facility]   Internal Medicine     2022  Cassidy Dobbs : 1965 Sex: female  Age:56 y.o. Chief Complaint   Patient presents with    3 Month Follow-Up    Diabetes    Hyperlipidemia    Carpal Tunnel     Getting injections - states working well     Shoulder Pain     Steroid injections have not helped. Doing home exercises and taking Mobic. Does not wanr to miss work to have surgery. HPI:   Patient presents to office for evaluation of the following medical concerns. - has lump to right paraspinal muscles of lower back. Declines worrk up at present.     - States had been seen in urgent care on multiple occasions. () -fever urine symptoms started on Omnicef UA showed nitrites Covid testing for fever, urine culture positive for Klebsiella sensitive to [address] [facility] second urgent care () -fever nasal congestion cough pharyngitis, Covid testing positive, Z-Rik prednisone taper. States resolved. - States hand and wrist pain. States was having swelling, decreased ROM. States was felt having reaction to new Wellbutrin XR, (2021) stopped med, toradol x 1, prednisone, labs RF,JESUSITA, ESR, CK, CBC, CMP - neg, CRP incr, gluc incr. HAd EMG () - electrodiagnostic evidence for a chronic right and left median mononeuropathy at the wrist (carpal tunnel syndrome) with mild chronic denervation. Had seen ortho hand and injections to b/l wrist (2021) and (2021). Still taking meloxicam as needed       - States having right shoulder pain. States does repetitive motion. No known truama or injury No swelling. No numbness. States did physical therapy and helped. Had MRI shoulder () suggesting rotator ciff and tendonosis. Following with ortho. States had injection (2021), (2021). Unchanged     - States issues with anxiety. Improved but not controlled. On zoloft 100mg daily. States off lorazepam. Stopped hydroxyzine as needed (taking twice a day).  States some side effects with medications, fatigue. Following with psychiatry and psychology. States feels overwhelmed at times. No suicidal thoughts.      -  has diabetes. States last A1c was 6.8 (12/2021). States on metformin. No reported side effects     - States has high cholesterol due to diabetes. Recommended statin. - States has high blood pressure. Occasionally checking blood pressure at home. States has been having dry cough. States also having alopecia. Uncertain if alopecia is lisinopril and or zoloft. - States has MICKI. States not currently wearing mask. Needs new sleep study. States sleeping has improved with medications. States has appointment with sleep center (3/2021)      - States has GERD symptoms. States trying to watch diet. On tums otc. States symptoms are worsening.     - has obesity.     - States has tremors. Was told essential tremors. Uncontrolled. - States has vitamin D def. On supplement.     - States has swelling to axilla b/l. Nontender to palpation.     - labs from 12/28/2021 reviewed with patient 2/17/2022    Health Maintenance   - immunizations:   Influenza Vaccination - (2020), (2021)    Pneumonia Vaccination  Zoster/Shingles Vaccine  Tetanus Vaccination  covid     - Screenings:   Bone Density Scan   Pelvic/Pap Exam  Mammogram - (7/20) - negative      Colonoscopy (3/21) - normal colonoscopy, follow up in 5 yrs     ROS:  Review of Systems   Constitutional: Negative for appetite change, chills, fever and unexpected weight change. HENT: Negative for congestion, rhinorrhea and sore throat. Eyes: Negative for pain and visual disturbance. Respiratory: Negative for cough, chest tightness and shortness of breath. Cardiovascular: Negative for chest pain and palpitations. Gastrointestinal: Negative for abdominal pain, blood in stool, constipation, diarrhea, nausea and vomiting.    Genitourinary: Negative for difficulty urinating, dysuria, frequency, pelvic pain, urgency and vaginal bleeding. Musculoskeletal: Negative for arthralgias and myalgias. Skin: Negative for rash. Neurological: Negative for dizziness, syncope, weakness, light-headedness, numbness and headaches. Hematological: Negative for adenopathy. Psychiatric/Behavioral: Negative for suicidal ideas. The patient is nervous/anxious. Current Outpatient Medications:     meloxicam (MOBIC) 7.5 MG tablet, 1 tablet daily as needed for hand pain. Take with food. , Disp: 30 tablet, Rfl: 2    cyclobenzaprine (FLEXERIL) 10 MG tablet, Take 1 tablet by mouth 3 times daily as needed for Muscle spasms, Disp: 60 tablet, Rfl: 2    hydroCHLOROthiazide (MICROZIDE) 12.5 MG capsule, Take 1 capsule by mouth every morning, Disp: 90 capsule, Rfl: 1    losartan (COZAAR) 25 MG tablet, Take 1 tablet by mouth daily, Disp: 90 tablet, Rfl: 1    vitamin D (ERGOCALCIFEROL) 1.25 MG (06148 UT) CAPS capsule, Take 1 capsule by mouth once a week, Disp: 12 capsule, Rfl: 0    metFORMIN (GLUCOPHAGE) 500 MG tablet, Take 1 tablet by mouth daily (with breakfast), Disp: 90 tablet, Rfl: 1    atorvastatin (LIPITOR) 20 MG tablet, Take 1 tablet by mouth daily, Disp: 30 tablet, Rfl: 5    sertraline (ZOLOFT) 100 MG tablet, Take 100 mg by mouth daily , Disp: , Rfl:     omeprazole (PRILOSEC) 20 MG delayed release capsule, Take 1 capsule by mouth every morning (before breakfast) (Patient not taking: Reported on 2/17/2022), Disp: 30 capsule, Rfl: 2    hydrOXYzine (VISTARIL) 25 MG capsule, Take 25 mg by mouth 3 times daily as needed for Anxiety  (Patient not taking: Reported on 2/17/2022), Disp: , Rfl:     Allergies   Allergen Reactions    Albuterol      Made her feel funny     Wellbutrin [Bupropion] Swelling and Other (See Comments)     Upper extremity arthralgias       Past Medical History:   Diagnosis Date    Bilateral carpal tunnel syndrome 8/17/2021    COVID-19 12/14/2020    Diabetes mellitus ([facility] [address]     Essential hypertension     Essential tremor     Generalized anxiety disorder 2020    Sleep apnea        Past Surgical History:   Procedure Laterality Date     SECTION      COLONOSCOPY      10 years ago    COLONOSCOPY N/A 3/29/2021    COLORECTAL CANCER SCREENING, NOT HIGH RISK performed by Abiola Capps MD at [address] [facility]        Family History   Problem Relation Age of Onset    Hypothyroidism Mother     Hypotension Mother     Cancer Father         gallbladder  52yr    Hypotension Brother     Heart Attack Other         Uncle on father's side  age 44       Social History     Socioeconomic History    Marital status:      Spouse name: None    Number of children: None    Years of education: None    Highest education level: None   Occupational History    None   Tobacco Use    Smoking status: Former Smoker     Packs/day: 0.75     Years: 20.00     Pack years: 15.00     Types: Cigarettes     Start date: 1998     Quit date: 3/4/2018     Years since quitting: 3.9    Smokeless tobacco: Never Used   Substance and Sexual Activity    Alcohol use: Yes    Drug use: Never    Sexual activity: None   Other Topics Concern    None   Social History Narrative    None     Social Determinants of Health     Financial Resource Strain: Low Risk     Difficulty of Paying Living Expenses: Not hard at all   Food Insecurity: No Food Insecurity    Worried About Running Out of Food in the Last Year: Never true    Stephanie of Food in the Last Year: Never true   Transportation Needs:     Lack of Transportation (Medical): Not on file    Lack of Transportation (Non-Medical):  Not on file   Physical Activity:     Days of Exercise per Week: Not on file    Minutes of Exercise per Session: Not on file   Stress:     Feeling of Stress : Not on file   Social Connections:     Frequency of Communication with Friends and Family: Not on file    Frequency of Social Gatherings with Friends and Family: Not on file    Attends Catholic Services: Not on file    Active Member of Clubs or Organizations: Not on file    Attends Club or Organization Meetings: Not on file    Marital Status: Not on file   Intimate Partner Violence:     Fear of Current or Ex-Partner: Not on file    Emotionally Abused: Not on file    Physically Abused: Not on file    Sexually Abused: Not on file   Housing Stability:     Unable to Pay for Housing in the Last Year: Not on file    Number of [place] in the Last Year: Not on file    Unstable Housing in the Last Year: Not on file        Vitals:    02/17/22 1032   BP: 124/80   Pulse: 86   Temp: 97.9 °F (36.6 °C)   SpO2: 98%   Weight: 228 lb (103.4 kg)   Height: 5' 6\" (1.676 m)       Exam:  Physical Exam  Vitals reviewed. Constitutional:       Appearance: She is well-developed. HENT:      Head: Normocephalic and atraumatic. Right Ear: External ear normal.      Left Ear: External ear normal.   Eyes:      Pupils: Pupils are equal, round, and reactive to light. Neck:      Thyroid: No thyromegaly. Cardiovascular:      Rate and Rhythm: Normal rate and regular rhythm. Heart sounds: Normal heart sounds. No murmur heard. Pulmonary:      Effort: Pulmonary effort is normal.      Breath sounds: Normal breath sounds. No wheezing. Abdominal:      General: Bowel sounds are normal. There is no distension. Palpations: Abdomen is soft. Tenderness: There is no abdominal tenderness. There is no guarding or rebound. Musculoskeletal:         General: Normal range of motion. Cervical back: Normal range of motion and neck supple. Lymphadenopathy:      Cervical: No cervical adenopathy. Skin:     General: Skin is warm and dry. Neurological:      Mental Status: She is alert and oriented to person, place, and time. Cranial Nerves: No cranial nerve deficit.    Psychiatric:         Judgment: Judgment normal.         Assessment and Plan:    Diagnoses and all orders for this visit:    Essential hypertension  - watch diet   - monitor blood pressure   - on lisinopril since DM diagnosis - states dry cough and poss alopecia   - stop lisinopril due to cough   - on losartan   - on hctz   - stable     Type 2 diabetes mellitus with hyperglycemia, without long-term current use of insulin (HCC)  - watch diet   - on metformin   - follow A1c - last was 6.8 (12/2021)     Not on ACE  Not on statin   Not aspirin   Follows optho     Mixed hyperlipidemia  - watch diet   - recommend statin given Diabetes dx   - will start atorvastatin 10mg     Gastroesophageal reflux disease without esophagitis  - watch diet   - trial of prilosec 2-4 weeks   - if not improving GI referral     Bilateral carpal tunnel syndrome  - xray hands and wrists   - EMG suggested mild carpal tunnel   - had injections to b/l wrist x 2 (9/21), (12/21)   - improved     Alopecia  - thyroid was normal   - check testosterone and DHE  - has seen endocrinology   - per patient improved and stable   -  (5/21) -hirsutism -order labs, DHEA (dec), , DHEA-S (-), testosterone (-), 17 hydroxyprogesterone (-), cortisol (-)  androstenedione (-), cmp (-), gluc (incr)     Axillary swelling  - states bilateral and non tender  - US was nornmal     Generalized anxiety disorder  -loft 100mg per psychiatry   - self stopped  hydroxyzine prn   - off Wellbutrin - had reaction (8/2021)   - no suicidal thoughts     Obstructive sleep apnea syndrome  - has not been wearing cpap   - will need new sleep study   - to see sleep medicine     Vitamin D deficiency  - on vit D [id number] weekly   - follow labs     Class 2 obesity due to excess calories without serious comorbidity with body mass index (BMI) of 36.0 to 36.9 in adult  - discussed diet and exercise   - has lost weight since last visit (9/2020)     Acute pain of right shoulder  - recommend exercises   - seen ortho   - had injection (9/2021) and (12/2021)   - improved     Dizziness  - elements of vertigo or poss ortho stasis   - discussed medications   - referral to ENT    - home exercises    Return in about 3 months (around 2022) for check up and review. Orders Placed This Encounter   Procedures    RAGHU RON DIGITAL SCREEN BILATERAL PER PROTOCOL     Further imaging can be completed per [address] protocol     Standing Status:   Future     Standing Expiration Date:   2023     Order Specific Question:   Reason for exam:     Answer:   screening mammogram     Requested Prescriptions     Signed Prescriptions Disp Refills    meloxicam (MOBIC) 7.5 MG tablet 30 tablet 2     Si tablet daily as needed for hand pain. Take with food.     cyclobenzaprine (FLEXERIL) 10 MG tablet 60 tablet 2     Sig: Take 1 tablet by mouth 3 times daily as needed for Muscle spasms    hydroCHLOROthiazide (MICROZIDE) 12.5 MG capsule 90 capsule 1     Sig: Take 1 capsule by mouth every morning    losartan (COZAAR) 25 MG tablet 90 tablet 1     Sig: Take 1 tablet by mouth daily    vitamin D (ERGOCALCIFEROL) 1.25 MG (16417 UT) CAPS capsule 12 capsule 0     Sig: Take 1 capsule by mouth once a week    metFORMIN (GLUCOPHAGE) 500 MG tablet 90 tablet 1     Sig: Take 1 tablet by mouth daily (with breakfast)    atorvastatin (LIPITOR) 20 MG tablet 30 tablet 5     Sig: Take 1 tablet by mouth daily        Colonel Justin MD  2022  12:39 PM

## 2022-02-17 NOTE — LETTER
[address] [place] [address]  Phone: [phone]  Fax: [phone]    Radha Jernigan MD        February 17, 2022     Patient: Luzmaria Zacarias   YOB: 1965   Date of Visit: 2/17/2022       To Whom It May Concern: It is my medical opinion that Luzmaria Zacarias is fit for work. If you have any questions or concerns, please don't hesitate to call.     Sincerely,        Radha Jernigan MD

## 2022-03-05 NOTE — PROGRESS NOTES
[facility]    Patient Name: Oanh Sanchez  Age: 49 y.o.   : 1965    Date of Visit: 3/7/22      HPI   Oanh Sanchez is a 49 y.o. female with  has a past medical history of Bilateral carpal tunnel syndrome (2021), COVID-19 (2020), Diabetes mellitus ([facility] [address] Essential hypertension, Essential tremor, Generalized anxiety disorder (2020), and Sleep apnea. She presents as a new patient to Sleep Clinic, referred by Dr. Sinai Reddy, for Sleep Apnea   . Patient presents to establish with sleep medicine for the management of her sleep apnea. She had her first and only sleep [place] 10-12 [place]. She is unaware of the name of the lab, but was told she had \"one of the worst cases of sleep apnea they ever saw \". Patient was prescribed CPAP shortly after her sleep study and used it regularly for the first year, but has not used it in over 9 years because she frequently woke up with \"bad stomach pain\" when using it. She is unsure of her pressure setting, as well as the type of mask that she used at the time. She still has the device, but is not even sure if it is functioning properly. She no longer has the proper supplies. Since her sleep study, patient has gradually improved her quality of sleep through the years. She feels the stress of raising her daughter as well as her previous job contributed to her poor sleep quality and habits. She still admits to being tired during the day, but the exhaustion level has decreased since her sleep study 10 years ago. On average, patient is sleeping 5 hours a night with naps during the day on the weekends. She sleeps in a bed on her left side and will wake up on her back throughout the night. She does sleep with a cat in her bed. Patient is currently working evening shift at [employer], so her bed and wake times shift depending on whether she works or is off.   Typically, she takes around 30 minutes to fall asleep and does not use sleeping aids. She awakes throughout the night 1-2 times to use the restroom and can usually fall back asleep quickly. Sometimes, her chronic hip pain, carpal tunnel pain, and even anxiety can disrupt or disturb her sleep. Patient watches TV and plays on her phone before bed. She does not have the TV on all night. Patient displays several symptoms of sleep apnea including snoring, waking up with nocturnal gasping, occasional a.m. headaches, dry mouth, sleepiness in the day, and difficulty with memory. Since a positive COVID-19 infection, patient has noticed increased difficulty with memory and concentration. She denies drowsy driving recently, but states there has been times years ago where she felt tired behind the wheel. Patient drinks 1 cup of half caffeinated coffee in the morning and has a rare energy drink if she is tired before her evening shift. Through the pandemic, she started having increased anxiety and a dread to go to sleep due to fear of not waking up. She brought this up to her PCP, and is now being treated for anxiety and feeling improvement of the symptoms, as well as her sleep. Patient states that her weight fluctuates between 220 and 233 pounds on a regular basis. Patient is willing to proceed with sleep study and restart therapy for sleep apnea. Sleep Study History: No record of her previous sleep study. Bed time: 9-11 pm on her days off, when working sometimes 2 am   Wake time: 4:30 if not working, but on work days she will get up at 8 am     Sleep Latency (min): If no distractions, 30 min   Sleep Medications: None  Awakenings:  1-2  / bathroom / falls back asleep quickly   Estimated Sleep time (hours): 5   Daytime Naps:  On weekends, naps   Sleep disturbances: Hip issues, carpal tunnel, anxiety  Sleep Location: Bed-- sleeps on left side-- ends up on back when she wakes up during the night  Sleep environment: Sleeps with cat  Occupation: Evening shift - [employer]     SLEEP HYGIENE     Activities before bed: TV, phone  Caffeine:1 cup in the am.. half caffeine  Coffee    0   Soda    0   Tea   Rare energy drink  Alcohol: rare  Tobacco: Former, quit about 4 years ago     Sleep ROS:     Snoring: Y , but less snoring than 10 years ago  Witnessed apneas: N, but has rare nocturnal gasping  AM Headache: Sometimes  Dry Mouth: Y  Daytime Sleepiness: Y  Difficulty remembering things: Y, mild- since COVID-19  MVA  or near miss accident due to sleepiness in the past? N  Tonsillectomy? N  Have you lost or gained weight recently?  Fluctuates between 220-233 lbs       PARASOMNIAS/ NARCOLEPSY:  Hypnogogic/Hynopompic Hallucinations: N   Sleep paralysis: N  Cataplexy: N   REM behavior symptoms: N  Nightmare: N   Sleep Walking: N  Sleep Talking: N  RLS Symptoms: N    Sleep Medicine 3/7/2022   Sitting and reading 1   Watching TV 3   Sitting, inactive in a public place (e.g. a theatre or a meeting) 2   As a passenger in a car for an hour without a break 2   Lying down to rest in the afternoon when circumstances permit 2   Sitting and talking to someone 1   Sitting quietly after a lunch without alcohol 2   In a car, while stopped for a few minutes in traffic 0   Total score 13   Neck circumference (Inches) 17         PMH:  Past Medical History:   Diagnosis Date    Bilateral carpal tunnel syndrome 2021    COVID-19 2020    [facility] mellitus ([facility] [address]     Essential hypertension     Essential tremor     Generalized anxiety disorder 2020    Sleep apnea         PSH:  Past Surgical History:   Procedure Laterality Date     SECTION      COLONOSCOPY      10 years ago    COLONOSCOPY N/A 3/29/2021    COLORECTAL CANCER SCREENING, NOT HIGH RISK performed by Mary Gonzalez MD at [address] [place] [address]         Soc Hx:  Social History     Tobacco Use    Smoking status: Former Smoker     Packs/day: 0.75     Years: 20.00     Pack years: 15.00     Types: Cigarettes     Start date: 1998     Quit date: 3/4/2018     Years since quittin.0    Smokeless tobacco: Never Used   Substance Use Topics    Alcohol use: Yes    Drug use: Never        Fam Hx:  Family History   Problem Relation Age of Onset    Hypothyroidism Mother     Hypotension Mother     Cancer Father         gallbladder  52yr    Hypotension Brother     Heart Attack Other         Uncle on father's side  age 44        Current Outpatient Medications   Medication Sig Dispense Refill    meloxicam (MOBIC) 7.5 MG tablet 1 tablet daily as needed for hand pain. Take with food. 30 tablet 2    cyclobenzaprine (FLEXERIL) 10 MG tablet Take 1 tablet by mouth 3 times daily as needed for Muscle spasms 60 tablet 2    hydroCHLOROthiazide (MICROZIDE) 12.5 MG capsule Take 1 capsule by mouth every morning 90 capsule 1    losartan (COZAAR) 25 MG tablet Take 1 tablet by mouth daily 90 tablet 1    vitamin D (ERGOCALCIFEROL) 1.25 MG (85409 UT) CAPS capsule Take 1 capsule by mouth once a week 12 capsule 0    metFORMIN (GLUCOPHAGE) 500 MG tablet Take 1 tablet by mouth daily (with breakfast) 90 tablet 1    atorvastatin (LIPITOR) 20 MG tablet Take 1 tablet by mouth daily 30 tablet 5    omeprazole (PRILOSEC) 20 MG delayed release capsule Take 1 capsule by mouth every morning (before breakfast) 30 capsule 2    sertraline (ZOLOFT) 100 MG tablet Take 100 mg by mouth daily       hydrOXYzine (VISTARIL) 25 MG capsule Take 25 mg by mouth 3 times daily as needed for Anxiety        No current facility-administered medications for this visit. Review of Systems  (Sleep ROS above)     Constitutional: no chills, no fever   Eyes: no blurred vision  Cardiovascular: no chest pain, no lower extremity edema. Respiratory: no cough, no shortness of breath   Gastrointestinal:  no nausea,  no vomiting, no diarrhea.    Neurological:  no dizziness,  no headache, chronic mild memory changes    Objective:   Physical Exam  /78 (Site: Left Upper Arm, Position: Sitting, Cuff Size: Large Adult)   Pulse 77   Resp 12   Ht 5' 6\" (1.676 m)   Wt 230 lb 6.4 oz (104.5 kg)   LMP  (LMP Unknown)   SpO2 98%   BMI 37.19 kg/m²      Additional Measurements    03/07/22 1016   Neck circumference (Inches): 17       General: No acute distress. BMI of Body mass index is 37.19 kg/m². HEENT: Normocephalic, atraumatic. No oropharyngeal lesions. Neck circumference (Inches): 17  Mallampati class- 3  Tonsils- 1  Neck: Trachea midline  Lungs: Clear to auscultation bilaterally. No wheezes or crackles  Cardiac: Regular rate and rhythm. No murmurs appreciated. Neurologic: Normal gait. Balance intact. Psychiatric: Alert and oriented. Appropriate affect. PERTINENT LAB RESULTS  TSH   Date Value Ref Range Status   12/28/2021 0.604 0.270 - 4.200 uIU/mL Final      No results found for: FERRITIN         Assessment:      Rina BAILEY was seen today for sleep apnea. Diagnoses and all orders for this visit:    Obstructive sleep apnea  -     Covid-19 Ambulatory; Future  -     Baseline Diagnostic Sleep Study; Future    Obesity, unspecified classification, unspecified obesity type, unspecified whether serious comorbidity present    Sleep pattern disturbance    ·    Plan:      Liliana Albert is a 49 y.o. female with  has a past medical history of Bilateral carpal tunnel syndrome (8/17/2021), COVID-19 (12/14/2020), Diabetes mellitus ([facility] [address] Essential hypertension, Essential tremor, Generalized anxiety disorder (7/20/2020), and Sleep apnea. She presents as a new patient to Sleep Clinic with longstanding history of \"severe \"sleep apnea along with brief CPAP use over 8 years ago. Her [place] out of [place] 10-12 years ago and she stopped using CPAP after the first year due to chronic abdominal pain with use.   Patient is motivated to continue sleep apnea treatment and given the age of her last sleep study, an in lab sleep study will be performed to reassess level of severity, followed by possible PAP therapy. 1. Obstructive Sleep Apnea     -Given the age of her current device along with the length of time since her last sleep study, will proceed with in-lab split-night polysomnography. Order placed today for Amaya Cobian.    -It is possible given her symptoms of abdominal pain pain and bloating with CPAP use, that she was on inadequate pressure settings at the time of use. -Discussed pathophysiology of MICKI and its impact on daily well-being, as well as cardiometabolic and neurocognitive health (particularly in moderate-severe cases). -Discussed CPAP as first-line and gold-standard therapy for MICKI should diagnosis be confirmed. Patient understands that CPAP should be worn every night for the duration of the night (in order to not miss therapy during early-morning REM period) for maximum benefit.   -Patient willing to proceed with CPAP treatment if indicated based on sleep study. She is willing to retrial CPAP and an order will be placed following her sleep study results. -Reviewed new PAP Therapy instructions to be compliant with most insurance coverage:  - Use PAP device for atleast 4 hours nightly. - PAP therapy must be used for 70% of nights (5/7 nights per week)  - Patient must follow up in the clinic within 30-90 days from getting the PAP device.   -Provided handouts on MICKI, and CPAP. -Informed patient to call office if she does not hear from sleep lab about scheduling within 1 to 2 weeks.  -Avoid supine sleep and elevate head of bed to decrease potential sleep apnea events.        2.  Sleep Pattern Disturbance    -Bed and wake windows differ depending on workdays and off days.  -Try to keep regular bedtime routine on working days, even at bedtime is pushed to a later time.  -Can take 20-30 min power naps through the day if needed, but avoid excessive napping to not disrupt homeostatic sleep drive.  -Patient states that in a few months, she should be off of evening shift and working straight dayshift, which will likely improve sleep hygiene and habits. 3. Obesity (Body mass index is 37.19 kg/m².)     -Discussed impact of weight gain on MICKI severity. Patient understands that MICKI severity may improve with weight loss but no guarantee of cure can be made. Patient will follow up Return in about 4 months (around 7/7/2022) for Sleep Study Results, Follow up for sleep apnea, CPAP compliance.     Marquise Bryant, APRN-CNP  [address] [facility]  P -[phone] option 2  F- [phone]

## 2022-03-07 ENCOUNTER — OFFICE VISIT (OUTPATIENT)
Dept: SLEEP MEDICINE | Age: 57
End: 2022-03-07
Payer: COMMERCIAL

## 2022-03-07 VITALS
HEART RATE: 77 BPM | DIASTOLIC BLOOD PRESSURE: 78 MMHG | BODY MASS INDEX: 37.03 KG/M2 | HEIGHT: 66 IN | WEIGHT: 230.4 LBS | OXYGEN SATURATION: 98 % | SYSTOLIC BLOOD PRESSURE: 128 MMHG | RESPIRATION RATE: 12 BRPM

## 2022-03-07 DIAGNOSIS — G47.33 OBSTRUCTIVE SLEEP APNEA: Primary | ICD-10-CM

## 2022-03-07 DIAGNOSIS — E66.9 OBESITY, UNSPECIFIED CLASSIFICATION, UNSPECIFIED OBESITY TYPE, UNSPECIFIED WHETHER SERIOUS COMORBIDITY PRESENT: ICD-10-CM

## 2022-03-07 DIAGNOSIS — G47.20 SLEEP PATTERN DISTURBANCE: ICD-10-CM

## 2022-03-07 PROCEDURE — G8417 CALC BMI ABV UP PARAM F/U: HCPCS | Performed by: NURSE PRACTITIONER

## 2022-03-07 PROCEDURE — 99243 OFF/OP CNSLTJ NEW/EST LOW 30: CPT | Performed by: NURSE PRACTITIONER

## 2022-03-07 PROCEDURE — G8482 FLU IMMUNIZE ORDER/ADMIN: HCPCS | Performed by: NURSE PRACTITIONER

## 2022-03-07 PROCEDURE — G8427 DOCREV CUR MEDS BY ELIG CLIN: HCPCS | Performed by: NURSE PRACTITIONER

## 2022-03-07 ASSESSMENT — SLEEP AND FATIGUE QUESTIONNAIRES
HOW LIKELY ARE YOU TO NOD OFF OR FALL ASLEEP IN A CAR, WHILE STOPPED FOR A FEW MINUTES IN TRAFFIC: 0
HOW LIKELY ARE YOU TO NOD OFF OR FALL ASLEEP WHILE SITTING INACTIVE IN A PUBLIC PLACE: 2
ESS TOTAL SCORE: 13
NECK CIRCUMFERENCE (INCHES): 17
HOW LIKELY ARE YOU TO NOD OFF OR FALL ASLEEP WHILE LYING DOWN TO REST IN THE AFTERNOON WHEN CIRCUMSTANCES PERMIT: 2
HOW LIKELY ARE YOU TO NOD OFF OR FALL ASLEEP WHILE SITTING QUIETLY AFTER LUNCH WITHOUT ALCOHOL: 2
HOW LIKELY ARE YOU TO NOD OFF OR FALL ASLEEP WHILE SITTING AND READING: 1
HOW LIKELY ARE YOU TO NOD OFF OR FALL ASLEEP WHILE SITTING AND TALKING TO SOMEONE: 1
HOW LIKELY ARE YOU TO NOD OFF OR FALL ASLEEP WHEN YOU ARE A PASSENGER IN A CAR FOR AN HOUR WITHOUT A BREAK: 2
HOW LIKELY ARE YOU TO NOD OFF OR FALL ASLEEP WHILE WATCHING TV: 3

## 2022-03-07 NOTE — PATIENT INSTRUCTIONS
It was a pleasure seeing you today Mallorie Magaña! Summary of Today's Visit           -Please do not drive when you are sleepy   -Please sign a request of records consent form, so that we may receive all of your previous sleep study reports and clinical notes, if they were not available today. - Please schedule a 2 month follow up today, to go over results     COVID-19  Due to the COVID-19 Pandemic a COVID test will be required at this point. The test should be preformed exactly 7 days prior to your sleep study. There is the risk for COVID-19 in all outpatient centers. Please review the CDC website for risk in your area and to review all current precautions. If you would like to switch to a home sleep study, please call our office at the number below. COVID-19 Testing Sites  Please note, these sites will not test anyone without a physician order. (As of October 2020)  [address] Hours of Operation  Monday  Friday 6:00am  2:30pm.   [facility] [address]. Hours of Operation  Monday  Friday 6:00am  2:30pm.    [address] [place] [address]. Hours of Operation  Monday  Friday 6:00am  2:30pm.        It is always advised that you check with your insurance company to determine how your study will be covered. For general questions or scheduling issues, call our nurse        Marcelino Llanos [phone] option 2  f- [phone]           The general categories for the treatment of Sleep Apnea include:     1. Supportive Care  -Elevate the head of the bed   -Avoid sleeping on your back      2. Weight loss  -Start a healthy weight loss program     3. Oral Appliance \"Mouth Piece\"  (Mandibular Advancement Device)     4.  Positive pressure therapy with a machine and a mask (CPAP or BiPAP) 5. Different kinds of surgeries, including nasal surgery, surgery of the throat/windpipe, and nerve stimulation therapy (INSPIRE). Helpful Web Sites: For patients with ALL SLEEP DISORDERS: American Academy of Sleep Medicine http://sleepeducation. org; or [employer]: https://sleepfoundation. org  For patients with MICKI: American Sleep Apnea Association: http://sinha.org/. org  For patients with RLS: RLS Foundation: Jona.han  For patients with INSOMNIA: https://www.king.org/. org/articles/sleep/insomnia-causes-and-cures. htm  For patients with DEPRESSION: Fixber.com.ee. com/depression         Sleep Medicine Terms     CPAP - Continuous Positive Airway Pressure - 1 set pressure (i.e. 7 cwp)  APAP - Automatic Positive Airway Pressure - PAP device determines in real time what pressure will work best confined to certain settings. (I.e. 4-15 cwp)  BiPAP - Bilevel Positive Airway Pressure - Higher pressure on taking a breath and lower pressure upon breathing out (i.e. 10/6 cwp)  CWP - Centimeters of water pressure   [facility] who [facility] you your CPAP/APAP/BiPAP and supplies. PSG - Polysomnogram - Overnight Sleep study  Titration Study - Overnight sleep study with the PAP device tired at different settings  Split Night study - PSG and titration study          Sleep Studies: About This Test  What is it? Sleep studies are tests that watch what happens to your body during sleep. These studies usually are done in a sleep lab. Sleep labs are often located in hospitals. Sleep studies you do at home can be done with portable equipment. But they may not give the same results as a sleep lab. Why is this test done? Sleep studies are done for people who say that sleep isn't restful or that they are tired all day. These studies can help find sleep problems, such as:  · Sleep apnea.  This means that an adult regularly stops breathing during sleep for 10 seconds or longer. · Excessive snoring. · Problems with nighttime behaviors. These include sleepwalking, night terrors, bed-wetting, and REM behavior disorders (RBD). · Conditions such as periodic limb movement disorder. This is repeated muscle twitching of the feet, arms, or legs during sleep. · Seizures that occur at night (nocturnal seizures). How do you prepare for the test?  · Take a shower or bath before your test, but don't use sprays, oils, or gels on your hair. Don't wear makeup, fingernail polish, or fake nails. · Pack and take along a small overnight bag with personal items, such as a toothbrush, a comb, favorite pillows or blankets, and a book. You can wear your own nightclothes. · If you will have portable sleep monitoring, your doctor will explain how to use the equipment at home. How is the test done? · In the sleep lab, you will be in a private room, much like a hotel room. · Small pads or patches called electrodes will be placed on your head and body with a small amount of glue and tape. These will record things like brain activity, eye movement, oxygen levels, and snoring. · Soft elastic belts will be placed around your chest and belly to measure your breathing. · Your blood oxygen levels will be checked by a small clip (oximeter) placed either on the tip of your index finger or on your earlobe. · If you have sleep apnea, you may wear a mask that is connected to a continuous positive airway pressure (CPAP) machine. · Depending on the type of test, you will be allowed to sleep through the night or you'll be awakened periodically and asked to stay awake for a while. · If you use portable sleep monitoring, follow the instructions your doctor gave you. How long does the test take? · You will stay in the sleep lab overnight. For some tests, you will also stay part of the next day.   What happens after the test?  · You may not sleep well during the test and may be tired the next day.  · After your sleep problem has been identified, you may need a second study if your doctor orders treatment such as CPAP. Follow-up care is a key part of your treatment and safety. Be sure to make and go to all appointments, and call your doctor if you are having problems. It's also a good idea to keep a list of the medicines you take. Ask your doctor when you can expect to have your test results. Where can you learn more? Go to https://Solafeet.BaubleBar. org and sign in to your [employer] account. Enter IMarcia in the [employer] box to learn more about \"Sleep Studies: About This Test.\"     If you do not have an account, please click on the \"Sign Up Now\" link. Current as of: February 24, 2020               Content Version: 12.5  © 4001-6260 Healthwise, Incorporated. Care instructions adapted under license by [facility] ([facility]). If you have questions about a medical condition or this instruction, always ask your healthcare professional. [address] any warranty or liability for your use of this information.

## 2022-03-31 ENCOUNTER — OFFICE VISIT (OUTPATIENT)
Dept: ORTHOPEDIC SURGERY | Age: 57
End: 2022-03-31
Payer: COMMERCIAL

## 2022-03-31 VITALS — HEIGHT: 66 IN | BODY MASS INDEX: 36.32 KG/M2 | WEIGHT: 226 LBS

## 2022-03-31 DIAGNOSIS — G56.03 BILATERAL CARPAL TUNNEL SYNDROME: Primary | ICD-10-CM

## 2022-03-31 PROCEDURE — 20526 THER INJECTION CARP TUNNEL: CPT | Performed by: ORTHOPAEDIC SURGERY

## 2022-03-31 RX ORDER — BETAMETHASONE SODIUM PHOSPHATE AND BETAMETHASONE ACETATE 3; 3 MG/ML; MG/ML
12 INJECTION, SUSPENSION INTRA-ARTICULAR; INTRALESIONAL; INTRAMUSCULAR; SOFT TISSUE ONCE
Status: COMPLETED | OUTPATIENT
Start: 2022-03-31 | End: 2022-03-31

## 2022-03-31 RX ADMIN — BETAMETHASONE SODIUM PHOSPHATE AND BETAMETHASONE ACETATE 12 MG: 3; 3 INJECTION, SUSPENSION INTRA-ARTICULAR; INTRALESIONAL; INTRAMUSCULAR; SOFT TISSUE at 09:45

## 2022-03-31 NOTE — PROGRESS NOTES
Department of Orthopedic Surgery  Follow up      CHIEF COMPLAINT: Bilateral hand numbness and tingling    HISTORY OF PRESENT ILLNESS:                The patient is a 49 y.o. female who presents with bilateral hand numbness and tingling. The symptoms have been present for roughly 7 years. 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 She states that at the end of her work her hands are always numb. She states that she does have some nocturnal symptoms and wakes up with her hands asleep and then burning pain. She states that she was prescribed well future and within the past few months and this caused her hands and arms to swell up significantly. She states that after that her burning pain had significantly gotten worse. She is left-hand dominant. She also has pain at the base of the thumbs has been present for several years as well. EMG reviewed. Motor latencies for median nerve at the wrist.  Right wrist 5.36 left wrist 5.31 sensory latencies for the median nerve are 4.84 for the right and 5.42 for the left. Ulnar velocity for the right is 57 below the elbow 56 above the elbow. EMG portion shows decreased recruitment in the right and left abductor pollicis brevis. She reports excellent results with cortisone injections. She has about 3 months of relief with the injections. She did inquire about surgical options. She relates that she enjoys her job does not want take time off of work.       Past Medical History:        Diagnosis Date    Bilateral carpal tunnel syndrome 2021    COVID-19 2020    Diabetes mellitus ([address]     Essential hypertension     Essential tremor     Generalized anxiety disorder 2020    Sleep apnea      Past Surgical History:        Procedure Laterality Date     SECTION      COLONOSCOPY      10 years ago    COLONOSCOPY N/A 3/29/2021    COLORECTAL CANCER SCREENING, NOT HIGH RISK performed by Shira Greenfield MD at [address] [place] [address]  [facility]      Current Medications:   Current Facility-Administered Medications: betamethasone acetate-betamethasone sodium phosphate (CELESTONE) injection 12 mg, 12 mg, Intra-artICUlar, Once  Allergies:  Albuterol and Wellbutrin [bupropion]    Social History:   TOBACCO:   reports that she quit smoking about 4 years ago. Her smoking use included cigarettes. She started smoking about 23 years ago. She has a 15.00 pack-year smoking history. She has never used smokeless tobacco.  ETOH:   reports current alcohol use. DRUGS:   reports no history of drug use. ACTIVITIES OF DAILY LIVING:    OCCUPATION:    Family History:       Problem Relation Age of Onset    Hypothyroidism Mother     Hypotension Mother     Cancer Father         gallbladder  52yr    Hypotension Brother     Heart Attack Other         Uncle on father's side  age 44       REVIEW OF SYSTEMS:  CONSTITUTIONAL:  negative for  fevers, chills and sweats  EYES:  negative for  redness and icterus  HEENT:  negative for  sore throat and hoarseness  RESPIRATORY:  negative for  dry cough and dyspnea  CARDIOVASCULAR:  negative for  chest pain, orthopnea  MUSCULOSKELETAL: Continued bilateral carpal tunnel syndrome  NEUROLOGICAL:  positive for numbness and tingling    PHYSICAL EXAM:    VITALS:  Ht 5' 6\" (1.676 m)   Wt 226 lb (102.5 kg)   LMP  (LMP Unknown)   BMI 36.48 kg/m²   CONSTITUTIONAL:  awake, alert, cooperative, no apparent distress, and appears stated age  EYES:  extra-ocular muscles intact  ENT:  normocepalic, without obvious abnormality  NECK:  supple, symmetrical, trachea midline  LUNGS:  no increased work of breathing  CARDIOVASCULAR:  no edema  NEUROLOGIC:  Awake, alert, oriented to name, place and time. Cranial nerves II-XII are grossly intact. Motor is 5 out of 5 bilaterally. MUSCULOSKELETAL:    Right upper extremity  Skin is intact circumferentially. Negative Tinel's at the elbow.   Positive Tinel's at the wrist positive Rain compression at the wrist.  Positive CMC grind. Positive Finkelstein's. No pain over any of the A1 pulleys. No active triggering. Neurovascularly intact grossly. Good cap refill to all digits. Left upper extremity  Skin is intact circumferentially. Negative Tinel's at the elbow. Positive Tinel's at the wrist positive Rain compression at the wrist.  Negative CMC grind. Positive Finkelstein's. No pain over any of the A1 pulleys. No active triggering. Neurovascularly intact grossly. Good cap refill to all digits. DATA:    CBC:   Lab Results   Component Value Date    WBC 5.9 12/28/2021    RBC 5.21 12/28/2021    HGB 14.8 12/28/2021    HCT 45.0 12/28/2021    MCV 86.4 12/28/2021    MCH 28.4 12/28/2021    MCHC 32.9 12/28/2021    RDW 13.0 12/28/2021     12/28/2021    MPV 10.6 12/28/2021     PT/INR:    Lab Results   Component Value Date    PROTIME 11.1 03/04/2020    INR 1.0 03/04/2020         IMPRESSION:  · Bilateral carpal tunnel  · Diabetes mellitus  · Essential hypertension  · Anxiety    PLAN:    Discussed findings with the patient. Discussed conservative and surgical management with patient. She relates that she would like to have surgery but does not want take time off of work for the surgery. She is diabetic. Did relate that her diabetes would potentially affect her results and recommended open carpal tunnel release versus endoscopic carpal tunnel release in the setting of diabetes. However for return to work she may be a candidate for an endoscopic carpal tunnel release to expedite RTW. Procedure Note Carpal Tunnel Injection    The bilateral carpal tunnel was identified as the injection site. The risk and benefits of a cortisone injection were explained and the patient consented to the injection. Under sterile conditions, the carpal canal was injected with a mixture of 1 mL of 1% Lidocaine and 1 mL of 6 mg/mL Betamethasone without complication.  A sterile bandage was applied.

## 2022-05-31 DIAGNOSIS — G56.03 BILATERAL CARPAL TUNNEL SYNDROME: ICD-10-CM

## 2022-05-31 RX ORDER — MELOXICAM 7.5 MG/1
TABLET ORAL
Qty: 30 TABLET | Refills: 2 | Status: SHIPPED
Start: 2022-05-31 | End: 2022-09-14 | Stop reason: SDUPTHER

## 2022-05-31 NOTE — TELEPHONE ENCOUNTER
Last Appointment:  2/17/2022  Future Appointments   Date Time Provider Carlo Worley   7/18/2022  2:40 PM Noe Jacobo, APRN - CNP [facility]

## 2022-07-08 ENCOUNTER — TELEPHONE (OUTPATIENT)
Dept: SLEEP CENTER | Age: 57
End: 2022-07-08

## 2022-08-04 ENCOUNTER — TELEPHONE (OUTPATIENT)
Dept: FAMILY MEDICINE CLINIC | Age: 57
End: 2022-08-04

## 2022-08-04 NOTE — TELEPHONE ENCOUNTER
Left message for patient to call the office back. We got refill requests for Losartan, Metformin and HCTZ. However, patient was supposed to see Dr. Benja Hernandez in May for a 3 month f/u and that was canceled and never rescheduled. Once she schedules her f/u, we can send in enough medication to get her by until the appt. Thanks.

## 2022-09-14 ENCOUNTER — OFFICE VISIT (OUTPATIENT)
Dept: FAMILY MEDICINE CLINIC | Age: 57
End: 2022-09-14
Payer: COMMERCIAL

## 2022-09-14 VITALS
HEART RATE: 76 BPM | SYSTOLIC BLOOD PRESSURE: 120 MMHG | HEIGHT: 66 IN | WEIGHT: 231 LBS | TEMPERATURE: 96.9 F | RESPIRATION RATE: 20 BRPM | DIASTOLIC BLOOD PRESSURE: 78 MMHG | OXYGEN SATURATION: 96 % | BODY MASS INDEX: 37.12 KG/M2

## 2022-09-14 DIAGNOSIS — F41.1 GENERALIZED ANXIETY DISORDER: ICD-10-CM

## 2022-09-14 DIAGNOSIS — E78.2 MIXED HYPERLIPIDEMIA: ICD-10-CM

## 2022-09-14 DIAGNOSIS — L65.9 ALOPECIA: ICD-10-CM

## 2022-09-14 DIAGNOSIS — E11.65 TYPE 2 DIABETES MELLITUS WITH HYPERGLYCEMIA, WITHOUT LONG-TERM CURRENT USE OF INSULIN (HCC): ICD-10-CM

## 2022-09-14 DIAGNOSIS — R53.83 OTHER FATIGUE: ICD-10-CM

## 2022-09-14 DIAGNOSIS — G56.03 BILATERAL CARPAL TUNNEL SYNDROME: ICD-10-CM

## 2022-09-14 DIAGNOSIS — K21.9 GASTROESOPHAGEAL REFLUX DISEASE WITHOUT ESOPHAGITIS: ICD-10-CM

## 2022-09-14 DIAGNOSIS — I10 ESSENTIAL HYPERTENSION: Primary | ICD-10-CM

## 2022-09-14 DIAGNOSIS — G47.33 OBSTRUCTIVE SLEEP APNEA SYNDROME: ICD-10-CM

## 2022-09-14 DIAGNOSIS — Z23 NEED FOR INFLUENZA VACCINATION: ICD-10-CM

## 2022-09-14 DIAGNOSIS — E55.9 VITAMIN D DEFICIENCY: ICD-10-CM

## 2022-09-14 DIAGNOSIS — E66.09 CLASS 2 OBESITY DUE TO EXCESS CALORIES WITHOUT SERIOUS COMORBIDITY WITH BODY MASS INDEX (BMI) OF 36.0 TO 36.9 IN ADULT: ICD-10-CM

## 2022-09-14 PROCEDURE — 2022F DILAT RTA XM EVC RTNOPTHY: CPT | Performed by: INTERNAL MEDICINE

## 2022-09-14 PROCEDURE — G8417 CALC BMI ABV UP PARAM F/U: HCPCS | Performed by: INTERNAL MEDICINE

## 2022-09-14 PROCEDURE — 3017F COLORECTAL CA SCREEN DOC REV: CPT | Performed by: INTERNAL MEDICINE

## 2022-09-14 PROCEDURE — 1036F TOBACCO NON-USER: CPT | Performed by: INTERNAL MEDICINE

## 2022-09-14 PROCEDURE — 90471 IMMUNIZATION ADMIN: CPT | Performed by: INTERNAL MEDICINE

## 2022-09-14 PROCEDURE — G8427 DOCREV CUR MEDS BY ELIG CLIN: HCPCS | Performed by: INTERNAL MEDICINE

## 2022-09-14 PROCEDURE — 90674 CCIIV4 VAC NO PRSV 0.5 ML IM: CPT | Performed by: INTERNAL MEDICINE

## 2022-09-14 PROCEDURE — 99214 OFFICE O/P EST MOD 30 MIN: CPT | Performed by: INTERNAL MEDICINE

## 2022-09-14 PROCEDURE — 3046F HEMOGLOBIN A1C LEVEL >9.0%: CPT | Performed by: INTERNAL MEDICINE

## 2022-09-14 RX ORDER — CYCLOBENZAPRINE HCL 10 MG
10 TABLET ORAL 3 TIMES DAILY PRN
Qty: 60 TABLET | Refills: 5 | Status: SHIPPED | OUTPATIENT
Start: 2022-09-14

## 2022-09-14 RX ORDER — ERGOCALCIFEROL 1.25 MG/1
50000 CAPSULE ORAL WEEKLY
Qty: 12 CAPSULE | Refills: 3 | Status: SHIPPED | OUTPATIENT
Start: 2022-09-14

## 2022-09-14 RX ORDER — LOSARTAN POTASSIUM 25 MG/1
25 TABLET ORAL DAILY
Qty: 90 TABLET | Refills: 3 | Status: SHIPPED | OUTPATIENT
Start: 2022-09-14

## 2022-09-14 RX ORDER — MELOXICAM 7.5 MG/1
TABLET ORAL
Qty: 30 TABLET | Refills: 5 | Status: SHIPPED | OUTPATIENT
Start: 2022-09-14

## 2022-09-14 RX ORDER — HYDROCHLOROTHIAZIDE 12.5 MG/1
12.5 CAPSULE, GELATIN COATED ORAL EVERY MORNING
Qty: 90 CAPSULE | Refills: 3 | Status: SHIPPED | OUTPATIENT
Start: 2022-09-14

## 2022-09-14 RX ORDER — ATORVASTATIN CALCIUM 20 MG/1
20 TABLET, FILM COATED ORAL DAILY
Qty: 90 TABLET | Refills: 3 | Status: SHIPPED | OUTPATIENT
Start: 2022-09-14

## 2022-09-14 ASSESSMENT — ENCOUNTER SYMPTOMS
NAUSEA: 0
BLOOD IN STOOL: 0
VOMITING: 0
CONSTIPATION: 0
COUGH: 0
EYE PAIN: 0
CHEST TIGHTNESS: 0
SORE THROAT: 0
RHINORRHEA: 0
SHORTNESS OF BREATH: 0
ABDOMINAL PAIN: 0
DIARRHEA: 0

## 2022-09-14 ASSESSMENT — PATIENT HEALTH QUESTIONNAIRE - PHQ9
SUM OF ALL RESPONSES TO PHQ9 QUESTIONS 1 & 2: 0
2. FEELING DOWN, DEPRESSED OR HOPELESS: 0
SUM OF ALL RESPONSES TO PHQ QUESTIONS 1-9: 0
1. LITTLE INTEREST OR PLEASURE IN DOING THINGS: 0
SUM OF ALL RESPONSES TO PHQ QUESTIONS 1-9: 0

## 2022-09-14 NOTE — PROGRESS NOTES
[facility]) Physicians   Internal Medicine     2022  Misa Zhang : 1965 Sex: female  Age:57 y.o. Chief Complaint   Patient presents with    Gastroesophageal Reflux     F/u    Diabetes     F/u    Cholesterol Problem     F/u    Depression     F/u    Hypertension     F/u        HPI:   Patient presents to office for evaluation of the following medical concerns. - has lump to right paraspinal muscles of lower back. Declines work up at present 2022    - States issues with anxiety. Improved but not controlled. States off lorazepam.  States feels overwhelmed at times. No suicidal thoughts. Following with psychiatry and psychology. On zoloft increased to 150mg daily. On hydroxyzine as needed (taking twice a day). States some side effects with medications, fatigue.    - States has diabetes. States last A1c was 6.8 (2021). States on metformin. No reported side effects     - States has high blood pressure. Occasionally checking blood pressure at home. Off lisinopril due to dry cough. States also having alopecia. Uncertain if alopecia is lisinopril and or zoloft. Now on losartan     - States has high cholesterol. On atorvastatin. No reported side effects.     - States has MICKI. States not currently wearing mask. Needs new sleep study. States sleeping has improved with medications. Has seen sleep medicine. Last visit per reviewed report sleep med (3/22) - with CPAP treatment if indicated based on sleep study. She is willing to retrial CPAP and an order will be placed following her sleep study results. On hold at present due to cost.     - States has GERD symptoms. States trying to watch diet. On tums otc. States symptoms are worsening.     - States hand and wrist pain. States was having swelling, decreased ROM. States was felt having reaction to new Wellbutrin XR, (2021) stopped med, toradol x 1, prednisone, labs RF,JESUSITA, ESR, CK, CBC, CMP - neg, CRP incr, gluc incr.  Had EMG () - electrodiagnostic evidence for a chronic right and left median mononeuropathy at the wrist (carpal tunnel syndrome) with mild chronic denervation. Had seen ortho hand and injections to b/l wrist (9/2021), (12/2021) and (3/2022). Still taking meloxicam as needed    - States having right shoulder pain. States does repetitive motion. No known truama or injury No swelling. No numbness. States did physical therapy and helped. Had MRI shoulder (8/21) suggesting rotator ciff and tendonosis. Following with ortho. States had injection (9/2021), (12/2021). Unchanged. States doing home exercises. - has obesity.     - States has tremors. Was told essential tremors. Uncontrolled. - States has vitamin D def. On supplement.     - States has swelling to axilla b/l. Nontender to palpation. Health Maintenance   - immunizations:   Influenza Vaccination - ([id number]), (2022)   Pneumonia Vaccination  Zoster/Shingles Vaccine  Tetanus Vaccination  covid - declines     - Screenings:   Bone Density Scan   Pelvic/Pap Exam  Mammogram - (7/20) - negative      Colonoscopy (3/21) - normal colonoscopy, follow up in 5 yrs     ROS:  Review of Systems   Constitutional:  Negative for appetite change, chills, fever and unexpected weight change. HENT:  Negative for congestion, rhinorrhea and sore throat. Eyes:  Negative for pain and visual disturbance. Respiratory:  Negative for cough, chest tightness and shortness of breath. Cardiovascular:  Negative for chest pain and palpitations. Gastrointestinal:  Negative for abdominal pain, blood in stool, constipation, diarrhea, nausea and vomiting. Genitourinary:  Negative for difficulty urinating, dysuria, frequency, pelvic pain, urgency and vaginal bleeding. Musculoskeletal:  Negative for arthralgias and myalgias. Skin:  Negative for rash. Neurological:  Negative for dizziness, syncope, weakness, light-headedness, numbness and headaches.    Hematological:  Negative for adenopathy. Psychiatric/Behavioral:  Negative for suicidal ideas. The patient is nervous/anxious. Current Outpatient Medications:     atorvastatin (LIPITOR) 20 MG tablet, Take 1 tablet by mouth daily, Disp: 90 tablet, Rfl: 3    cyclobenzaprine (FLEXERIL) 10 MG tablet, Take 1 tablet by mouth 3 times daily as needed for Muscle spasms, Disp: 60 tablet, Rfl: 5    hydroCHLOROthiazide (MICROZIDE) 12.5 MG capsule, Take 1 capsule by mouth every morning, Disp: 90 capsule, Rfl: 3    losartan (COZAAR) 25 MG tablet, Take 1 tablet by mouth daily, Disp: 90 tablet, Rfl: 3    meloxicam (MOBIC) 7.5 MG tablet, 1 tablet daily as needed for hand pain. Take with food. , Disp: 30 tablet, Rfl: 5    metFORMIN (GLUCOPHAGE) 500 MG tablet, Take 1 tablet by mouth daily (with breakfast), Disp: 90 tablet, Rfl: 3    vitamin D (ERGOCALCIFEROL) 1.25 MG (16141 UT) CAPS capsule, Take 1 capsule by mouth once a week, Disp: 12 capsule, Rfl: 3    omeprazole (PRILOSEC) 20 MG delayed release capsule, Take 1 capsule by mouth every morning (before breakfast), Disp: 30 capsule, Rfl: 2    sertraline (ZOLOFT) 100 MG tablet, Take 150 mg by mouth daily, Disp: , Rfl:     hydrOXYzine (VISTARIL) 25 MG capsule, Take 25 mg by mouth 3 times daily as needed for Anxiety , Disp: , Rfl:     Allergies   Allergen Reactions    Albuterol      Made her feel funny     Wellbutrin [Bupropion] Swelling and Other (See Comments)     Upper extremity arthralgias       Past Medical History:   Diagnosis Date    Bilateral carpal tunnel syndrome 2021    COVID-19 2020    Diabetes mellitus ([address]     Essential hypertension     Essential tremor     Generalized anxiety disorder 2020    Sleep apnea        Past Surgical History:   Procedure Laterality Date     SECTION      COLONOSCOPY      10 years ago    COLONOSCOPY N/A 3/29/2021    COLORECTAL CANCER SCREENING, NOT HIGH RISK performed by Vernell Ocasio MD at [address] [facility]        Family History   Problem Relation Age of Onset    Hypothyroidism Mother     Hypotension Mother     Cancer Father         gallbladder  52yr    Hypotension Brother     Heart Attack Other         Uncle on father's side  age 44       Social History     Socioeconomic History    Marital status:      Spouse name: None    Number of children: None    Years of education: None    Highest education level: None   Tobacco Use    Smoking status: Former     Packs/day: 0.75     Years: 20.00     Pack years: 15.00     Types: Cigarettes     Start date: 1998     Quit date: 3/4/2018     Years since quittin.5    Smokeless tobacco: Never   Substance and Sexual Activity    Alcohol use: Yes    Drug use: Never     Social Determinants of Health     Financial Resource Strain: Low Risk     Difficulty of Paying Living Expenses: Not hard at all   Food Insecurity: No Food Insecurity    Worried About [address] in the Last Year: Never true    [address] [employer]  [address] in the Last Year: Never true        Vitals:    22 0814   BP: 120/78   Site: Left Upper Arm   Position: Sitting   Cuff Size: Large Adult   Pulse: 76   Resp: 20   Temp: 96.9 °F (36.1 °C)   TempSrc: Temporal   SpO2: 96%   Weight: 231 lb (104.8 kg)   Height: 5' 6\" (1.676 m)       Exam:  Physical Exam  Vitals reviewed. Constitutional:       Appearance: She is well-developed. HENT:      Head: Normocephalic and atraumatic. Right Ear: External ear normal.      Left Ear: External ear normal.   Eyes:      Pupils: Pupils are equal, round, and reactive to light. Neck:      Thyroid: No thyromegaly. Cardiovascular:      Rate and Rhythm: Normal rate and regular rhythm. Heart sounds: Normal heart sounds. No murmur heard. Pulmonary:      Effort: Pulmonary effort is normal.      Breath sounds: Normal breath sounds. No wheezing. Abdominal:      General: Bowel sounds are normal. There is no distension. Palpations: Abdomen is soft.       Tenderness: There is no abdominal tenderness. There is no guarding or rebound. Musculoskeletal:         General: Normal range of motion. Cervical back: Normal range of motion and neck supple. Lymphadenopathy:      Cervical: No cervical adenopathy. Skin:     General: Skin is warm and dry. Neurological:      Mental Status: She is alert and oriented to person, place, and time. Cranial Nerves: No cranial nerve deficit.    Psychiatric:         Judgment: Judgment normal.       Assessment and Plan:    Diagnoses and all orders for this visit:    Essential hypertension  - watch diet   - monitor blood pressure   - on lisinopril since DM diagnosis - states dry cough and poss alopecia   - stop lisinopril due to cough   - on losartan   - on hctz   - stable 9/14/2022    Type 2 diabetes mellitus with hyperglycemia, without long-term current use of insulin ([address]  - watch diet   - on metformin   - follow A1c - last was 6.8 (12/2021)     Not on ACE  Not on statin   Not aspirin   Follows optho     Mixed hyperlipidemia  - watch diet   - recommend statin given Diabetes dx   - will start atorvastatin 10mg     Gastroesophageal reflux disease without esophagitis  - watch diet   - trial of prilosec 2-4 weeks   - if not improving GI referral     Bilateral carpal tunnel syndrome  - xray hands and wrists   - EMG suggested mild carpal tunnel   - had injections to b/l wrist x 2 (9/21), (12/21), (3/22)   - improved     Alopecia  - thyroid was normal   - check testosterone and DHE  - has seen endocrinology   - per patient improved and stable   -  (5/21) -hirsutism -order labs, DHEA (dec), , DHEA-S (-), testosterone (-), 17 hydroxyprogesterone (-), cortisol (-)  androstenedione (-), cmp (-), gluc (incr)     Axillary swelling  - states bilateral and non tender  - US was nornmal     Generalized anxiety disorder  - on zoft 150mg per psychiatry   - self stopped  hydroxyzine prn   - off Wellbutrin - had reaction (8/2021)   - no suicidal thoughts Obstructive sleep apnea syndrome  - has not been wearing cpap   - will need new sleep study   - to see sleep medicine     Vitamin D deficiency  - on vit D [id number] weekly   - follow labs     Class 2 obesity due to excess calories without serious comorbidity with body mass index (BMI) of 36.0 to 36.9 in adult  - discussed diet and exercise   - has lost weight since last visit (9/2020)     Acute pain of right shoulder  - recommend exercises   - seen ortho   - had injection (9/2021) and (12/2021)   - improved     Dizziness  - elements of vertigo or poss ortho stasis   - discussed medications   - referral to ENT    - home exercises    Return in about 3 months (around 12/14/2022) for check up and review and labs.     Orders Placed This Encounter   Procedures    Influenza, FLUCELVAX, (age 10 mo+), IM, Preservative Free, 0.5 mL    Comprehensive Metabolic Panel     Standing Status:   Future     Standing Expiration Date:   9/14/2023    Magnesium     Standing Status:   Future     Standing Expiration Date:   9/14/2023    Lipid, Fasting     Standing Status:   Future     Standing Expiration Date:   9/14/2023    Hemoglobin A1C     Standing Status:   Future     Standing Expiration Date:   9/14/2023    TSH     Standing Status:   Future     Standing Expiration Date:   9/14/2023    Urinalysis     Standing Status:   Future     Standing Expiration Date:   9/14/2023    CBC with Auto Differential     Standing Status:   Future     Standing Expiration Date:   9/14/2023    Microalbumin, Ur     Standing Status:   Future     Standing Expiration Date:   9/14/2023    Vitamin D 25 Hydroxy     Standing Status:   Future     Standing Expiration Date:   9/14/2023     Requested Prescriptions     Signed Prescriptions Disp Refills    atorvastatin (LIPITOR) 20 MG tablet 90 tablet 3     Sig: Take 1 tablet by mouth daily    cyclobenzaprine (FLEXERIL) 10 MG tablet 60 tablet 5     Sig: Take 1 tablet by mouth 3 times daily as needed for Muscle spasms hydroCHLOROthiazide (MICROZIDE) 12.5 MG capsule 90 capsule 3     Sig: Take 1 capsule by mouth every morning    losartan (COZAAR) 25 MG tablet 90 tablet 3     Sig: Take 1 tablet by mouth daily    meloxicam (MOBIC) 7.5 MG tablet 30 tablet 5     Si tablet daily as needed for hand pain. Take with food.     metFORMIN (GLUCOPHAGE) 500 MG tablet 90 tablet 3     Sig: Take 1 tablet by mouth daily (with breakfast)    vitamin D (ERGOCALCIFEROL) 1.25 MG (72904 UT) CAPS capsule 12 capsule 3     Sig: Take 1 capsule by mouth once a week        Maggy Mendoza MD  2022  8:47 AM

## 2022-09-16 ENCOUNTER — TELEPHONE (OUTPATIENT)
Dept: FAMILY MEDICINE CLINIC | Age: 57
End: 2022-09-16

## 2022-09-16 NOTE — TELEPHONE ENCOUNTER
It is hard for me to say exactly what her symptoms are due to but it could be possibly a reaction to the flu shot    I would recommend fluids Tylenol as needed and evaluation in urgent care if not improving or if worsening emergency    As regards to work excuses I am okay with a letter for work excuse for the days that she is needing

## 2022-09-16 NOTE — TELEPHONE ENCOUNTER
Patient got her Flu shot on wednesday. She said Wednesday night,yesterday and today She has nausea, diarrhea,vomited, and body aches. Could this be a reaction to the flu shot she received on Wednesday? She needs a letter for work because she went home yesterday and may miss work today because of it. She said she was feeling fine on Wednesday before getting the shot. Please advise.

## 2022-11-02 ENCOUNTER — OFFICE VISIT (OUTPATIENT)
Dept: FAMILY MEDICINE CLINIC | Age: 57
End: 2022-11-02
Payer: COMMERCIAL

## 2022-11-02 VITALS
DIASTOLIC BLOOD PRESSURE: 88 MMHG | HEIGHT: 66 IN | WEIGHT: 236 LBS | BODY MASS INDEX: 37.93 KG/M2 | HEART RATE: 79 BPM | SYSTOLIC BLOOD PRESSURE: 138 MMHG | TEMPERATURE: 96.9 F | RESPIRATION RATE: 15 BRPM | OXYGEN SATURATION: 99 %

## 2022-11-02 DIAGNOSIS — R11.2 NAUSEA AND VOMITING, UNSPECIFIED VOMITING TYPE: ICD-10-CM

## 2022-11-02 DIAGNOSIS — R11.2 NAUSEA AND VOMITING, UNSPECIFIED VOMITING TYPE: Primary | ICD-10-CM

## 2022-11-02 DIAGNOSIS — R53.83 OTHER FATIGUE: ICD-10-CM

## 2022-11-02 DIAGNOSIS — R10.30 LOWER ABDOMINAL PAIN: ICD-10-CM

## 2022-11-02 LAB
ALBUMIN SERPL-MCNC: 4.3 G/DL (ref 3.5–5.2)
ALP BLD-CCNC: 94 U/L (ref 35–104)
ALT SERPL-CCNC: 33 U/L (ref 0–32)
ANION GAP SERPL CALCULATED.3IONS-SCNC: 14 MMOL/L (ref 7–16)
AST SERPL-CCNC: 42 U/L (ref 0–31)
BASOPHILS ABSOLUTE: 0.04 E9/L (ref 0–0.2)
BASOPHILS RELATIVE PERCENT: 0.8 % (ref 0–2)
BILIRUB SERPL-MCNC: 0.5 MG/DL (ref 0–1.2)
BILIRUBIN, POC: NORMAL
BLOOD URINE, POC: NORMAL
BUN BLDV-MCNC: 15 MG/DL (ref 6–20)
CALCIUM SERPL-MCNC: 9.7 MG/DL (ref 8.6–10.2)
CHLORIDE BLD-SCNC: 102 MMOL/L (ref 98–107)
CLARITY, POC: CLEAR
CO2: 24 MMOL/L (ref 22–29)
COLOR, POC: YELLOW
CREAT SERPL-MCNC: 0.6 MG/DL (ref 0.5–1)
EOSINOPHILS ABSOLUTE: 0.11 E9/L (ref 0.05–0.5)
EOSINOPHILS RELATIVE PERCENT: 2.1 % (ref 0–6)
GFR SERPL CREATININE-BSD FRML MDRD: >60 ML/MIN/1.73
GLUCOSE BLD-MCNC: 113 MG/DL (ref 74–99)
GLUCOSE URINE, POC: NORMAL
HCT VFR BLD CALC: 40.1 % (ref 34–48)
HEMOGLOBIN: 13.4 G/DL (ref 11.5–15.5)
IMMATURE GRANULOCYTES #: 0.02 E9/L
IMMATURE GRANULOCYTES %: 0.4 % (ref 0–5)
KETONES, POC: NORMAL
LEUKOCYTE EST, POC: NORMAL
LIPASE: 37 U/L (ref 13–60)
LYMPHOCYTES ABSOLUTE: 1.39 E9/L (ref 1.5–4)
LYMPHOCYTES RELATIVE PERCENT: 26.3 % (ref 20–42)
Lab: NORMAL
MCH RBC QN AUTO: 30 PG (ref 26–35)
MCHC RBC AUTO-ENTMCNC: 33.4 % (ref 32–34.5)
MCV RBC AUTO: 89.9 FL (ref 80–99.9)
MONOCYTES ABSOLUTE: 0.37 E9/L (ref 0.1–0.95)
MONOCYTES RELATIVE PERCENT: 7 % (ref 2–12)
NEUTROPHILS ABSOLUTE: 3.36 E9/L (ref 1.8–7.3)
NEUTROPHILS RELATIVE PERCENT: 63.4 % (ref 43–80)
NITRITE, POC: NORMAL
PDW BLD-RTO: 13.2 FL (ref 11.5–15)
PERFORMING INSTRUMENT: NORMAL
PH, POC: NORMAL
PLATELET # BLD: 149 E9/L (ref 130–450)
PMV BLD AUTO: 10.1 FL (ref 7–12)
POTASSIUM SERPL-SCNC: 4.1 MMOL/L (ref 3.5–5)
PROTEIN, POC: NORMAL
QC PASS/FAIL: NORMAL
RBC # BLD: 4.46 E12/L (ref 3.5–5.5)
SARS-COV-2, POC: NORMAL
SODIUM BLD-SCNC: 140 MMOL/L (ref 132–146)
SPECIFIC GRAVITY, POC: NORMAL
TOTAL PROTEIN: 6.9 G/DL (ref 6.4–8.3)
UROBILINOGEN, POC: 0.2
WBC # BLD: 5.3 E9/L (ref 4.5–11.5)

## 2022-11-02 PROCEDURE — G8428 CUR MEDS NOT DOCUMENT: HCPCS | Performed by: FAMILY MEDICINE

## 2022-11-02 PROCEDURE — 3074F SYST BP LT 130 MM HG: CPT | Performed by: FAMILY MEDICINE

## 2022-11-02 PROCEDURE — 3078F DIAST BP <80 MM HG: CPT | Performed by: FAMILY MEDICINE

## 2022-11-02 PROCEDURE — 87426 SARSCOV CORONAVIRUS AG IA: CPT | Performed by: FAMILY MEDICINE

## 2022-11-02 PROCEDURE — 81002 URINALYSIS NONAUTO W/O SCOPE: CPT | Performed by: FAMILY MEDICINE

## 2022-11-02 PROCEDURE — 99214 OFFICE O/P EST MOD 30 MIN: CPT | Performed by: FAMILY MEDICINE

## 2022-11-02 PROCEDURE — 1036F TOBACCO NON-USER: CPT | Performed by: FAMILY MEDICINE

## 2022-11-02 PROCEDURE — 3017F COLORECTAL CA SCREEN DOC REV: CPT | Performed by: FAMILY MEDICINE

## 2022-11-02 PROCEDURE — G8482 FLU IMMUNIZE ORDER/ADMIN: HCPCS | Performed by: FAMILY MEDICINE

## 2022-11-02 PROCEDURE — G8417 CALC BMI ABV UP PARAM F/U: HCPCS | Performed by: FAMILY MEDICINE

## 2022-11-02 NOTE — PROGRESS NOTES
Darryle Eaton In    [address] presents to the office today for   Chief Complaint   Patient presents with    Generalized Body Aches    Fatigue    Nausea & Vomiting     Here for several days of feeling unwell  Sunday noted fatigue  Monday noted abdominal pain in her lower abdomen  Vomited at work - had to leave  Yesterday had to leave work and went home and slept 4-5 hours  Got up and felt hungry, ate chicken soup, felt belching after but no vomiting  Fatigued, achy today so came in  No fever  Urinary incontinence twice so wondered about UTI    Review of Systems     /88   Pulse 79   Temp 96.9 °F (36.1 °C) (Temporal)   Resp 15   Ht 5' 6\" (1.676 m)   Wt 236 lb (107 kg)   LMP  (LMP Unknown)   SpO2 99%   BMI 38.09 kg/m²   Physical Exam  Constitutional:       Appearance: Normal appearance. HENT:      Head: Normocephalic and atraumatic. Eyes:      Extraocular Movements: Extraocular movements intact. Conjunctiva/sclera: Conjunctivae normal.   Cardiovascular:      Rate and Rhythm: Normal rate. Heart sounds: Normal heart sounds. Pulmonary:      Effort: Pulmonary effort is normal.      Breath sounds: Normal breath sounds. Abdominal:      Palpations: Abdomen is soft. Tenderness: There is abdominal tenderness. There is no guarding or rebound. Skin:     General: Skin is warm. Neurological:      Mental Status: She is alert and oriented to person, place, and time.    Psychiatric:         Mood and Affect: Mood normal.         Behavior: Behavior normal.          Current Outpatient Medications:     atorvastatin (LIPITOR) 20 MG tablet, Take 1 tablet by mouth daily, Disp: 90 tablet, Rfl: 3    cyclobenzaprine (FLEXERIL) 10 MG tablet, Take 1 tablet by mouth 3 times daily as needed for Muscle spasms, Disp: 60 tablet, Rfl: 5    hydroCHLOROthiazide (MICROZIDE) 12.5 MG capsule, Take 1 capsule by mouth every morning, Disp: 90 capsule, Rfl: 3    losartan (COZAAR) 25 MG tablet, Take 1 tablet by mouth daily, Disp: 90 tablet, Rfl: 3    meloxicam (MOBIC) 7.5 MG tablet, 1 tablet daily as needed for hand pain. Take with food. , Disp: 30 tablet, Rfl: 5    metFORMIN (GLUCOPHAGE) 500 MG tablet, Take 1 tablet by mouth daily (with breakfast), Disp: 90 tablet, Rfl: 3    vitamin D (ERGOCALCIFEROL) 1.25 MG (43085 UT) CAPS capsule, Take 1 capsule by mouth once a week, Disp: 12 capsule, Rfl: 3    omeprazole (PRILOSEC) 20 MG delayed release capsule, Take 1 capsule by mouth every morning (before breakfast), Disp: 30 capsule, Rfl: 2    sertraline (ZOLOFT) 100 MG tablet, Take 150 mg by mouth daily, Disp: , Rfl:     hydrOXYzine (VISTARIL) 25 MG capsule, Take 25 mg by mouth 3 times daily as needed for Anxiety , Disp: , Rfl:      Past Medical History:   Diagnosis Date    Bilateral carpal tunnel syndrome 8/17/2021    COVID-19 12/14/2020    Diabetes mellitus ([facility]     Essential hypertension     Essential tremor     Generalized anxiety disorder 7/20/2020    Sleep apnea        Rina BAILEY was seen today for generalized body aches, fatigue and nausea & vomiting. Diagnoses and all orders for this visit:    Nausea and vomiting, unspecified vomiting type  -     CBC with Auto Differential; Future  -     Comprehensive Metabolic Panel; Future  -     Lipase; Future  -     POCT COVID-19, Antigen    Lower abdominal pain  -     CBC with Auto Differential; Future  -     Comprehensive Metabolic Panel; Future  -     Lipase; Future  -     POCT Urinalysis no Micro    Other fatigue  -     CBC with Auto Differential; Future  -     Comprehensive Metabolic Panel; Future  -     Lipase;  Future  -     POCT COVID-19, Antigen     POC UA normal  Labs today  Seems like viral gastro  If pain worsens needs ER for CT scan    Octavio Sewell MD

## 2022-11-03 ENCOUNTER — TELEPHONE (OUTPATIENT)
Dept: FAMILY MEDICINE CLINIC | Age: 57
End: 2022-11-03

## 2022-11-03 NOTE — TELEPHONE ENCOUNTER
Patient called. She said she still is not feeling well. She would like her letter extended to be off tomorrow and return back on Monday if possible? She saw you through express care yesterday.

## 2022-11-03 NOTE — LETTER
[address] [place] [address] Drive  [address] [address]  Phone: [phone]  Fax: [phone]    Darrell Pitt MD        November 3, 2022     Patient: Telma Baez   YOB: 1965   Date of Visit: 11/3/2022       To Whom It May Concern: It is my medical opinion that Telma Baez should remain out of work until 11/7/22. If you have any questions or concerns, please don't hesitate to call.     Sincerely,        Mena Kraft MD

## 2022-12-09 DIAGNOSIS — E11.65 TYPE 2 DIABETES MELLITUS WITH HYPERGLYCEMIA, WITHOUT LONG-TERM CURRENT USE OF INSULIN (HCC): ICD-10-CM

## 2022-12-09 DIAGNOSIS — E55.9 VITAMIN D DEFICIENCY: ICD-10-CM

## 2022-12-09 DIAGNOSIS — R53.83 OTHER FATIGUE: ICD-10-CM

## 2022-12-09 DIAGNOSIS — E78.2 MIXED HYPERLIPIDEMIA: ICD-10-CM

## 2022-12-09 LAB
ALBUMIN SERPL-MCNC: 4.1 G/DL (ref 3.5–5.2)
ALP BLD-CCNC: 73 U/L (ref 35–104)
ALT SERPL-CCNC: 32 U/L (ref 0–32)
ANION GAP SERPL CALCULATED.3IONS-SCNC: 13 MMOL/L (ref 7–16)
AST SERPL-CCNC: 39 U/L (ref 0–31)
BASOPHILS ABSOLUTE: 0.03 E9/L (ref 0–0.2)
BASOPHILS RELATIVE PERCENT: 0.7 % (ref 0–2)
BILIRUB SERPL-MCNC: 0.7 MG/DL (ref 0–1.2)
BILIRUBIN URINE: NEGATIVE
BLOOD, URINE: NEGATIVE
BUN BLDV-MCNC: 19 MG/DL (ref 6–20)
CALCIUM SERPL-MCNC: 9.3 MG/DL (ref 8.6–10.2)
CHLORIDE BLD-SCNC: 101 MMOL/L (ref 98–107)
CHOLESTEROL, FASTING: 118 MG/DL (ref 0–199)
CLARITY: NORMAL
CO2: 25 MMOL/L (ref 22–29)
COLOR: YELLOW
CREAT SERPL-MCNC: 0.6 MG/DL (ref 0.5–1)
EOSINOPHILS ABSOLUTE: 0.13 E9/L (ref 0.05–0.5)
EOSINOPHILS RELATIVE PERCENT: 3.2 % (ref 0–6)
GFR SERPL CREATININE-BSD FRML MDRD: >60 ML/MIN/1.73
GLUCOSE BLD-MCNC: 143 MG/DL (ref 74–99)
GLUCOSE URINE: NEGATIVE MG/DL
HBA1C MFR BLD: 8.5 % (ref 4–5.6)
HCT VFR BLD CALC: 39.7 % (ref 34–48)
HDLC SERPL-MCNC: 45 MG/DL
HEMOGLOBIN: 13.1 G/DL (ref 11.5–15.5)
IMMATURE GRANULOCYTES #: 0.01 E9/L
IMMATURE GRANULOCYTES %: 0.2 % (ref 0–5)
KETONES, URINE: NEGATIVE MG/DL
LDL CHOLESTEROL CALCULATED: 59 MG/DL (ref 0–99)
LEUKOCYTE ESTERASE, URINE: NEGATIVE
LYMPHOCYTES ABSOLUTE: 1.23 E9/L (ref 1.5–4)
LYMPHOCYTES RELATIVE PERCENT: 30.7 % (ref 20–42)
MAGNESIUM: 2 MG/DL (ref 1.6–2.6)
MCH RBC QN AUTO: 30.2 PG (ref 26–35)
MCHC RBC AUTO-ENTMCNC: 33 % (ref 32–34.5)
MCV RBC AUTO: 91.5 FL (ref 80–99.9)
MONOCYTES ABSOLUTE: 0.23 E9/L (ref 0.1–0.95)
MONOCYTES RELATIVE PERCENT: 5.7 % (ref 2–12)
NEUTROPHILS ABSOLUTE: 2.38 E9/L (ref 1.8–7.3)
NEUTROPHILS RELATIVE PERCENT: 59.5 % (ref 43–80)
NITRITE, URINE: NEGATIVE
PDW BLD-RTO: 13.2 FL (ref 11.5–15)
PH UA: 6 (ref 5–9)
PLATELET # BLD: 141 E9/L (ref 130–450)
PMV BLD AUTO: 10.2 FL (ref 7–12)
POTASSIUM SERPL-SCNC: 3.9 MMOL/L (ref 3.5–5)
PROTEIN UA: NEGATIVE MG/DL
RBC # BLD: 4.34 E12/L (ref 3.5–5.5)
SODIUM BLD-SCNC: 139 MMOL/L (ref 132–146)
SPECIFIC GRAVITY UA: >=1.03 (ref 1–1.03)
TOTAL PROTEIN: 6.5 G/DL (ref 6.4–8.3)
TRIGLYCERIDE, FASTING: 68 MG/DL (ref 0–149)
TSH SERPL DL<=0.05 MIU/L-ACNC: 0.64 UIU/ML (ref 0.27–4.2)
UROBILINOGEN, URINE: 0.2 E.U./DL
VITAMIN D 25-HYDROXY: 30 NG/ML (ref 30–100)
VLDLC SERPL CALC-MCNC: 14 MG/DL
WBC # BLD: 4 E9/L (ref 4.5–11.5)

## 2022-12-10 LAB
BACTERIA: ABNORMAL /HPF
EPITHELIAL CELLS, UA: ABNORMAL /HPF
MICROALBUMIN UR-MCNC: <12 MG/L
RBC UA: ABNORMAL /HPF (ref 0–2)
WBC UA: ABNORMAL /HPF (ref 0–5)

## 2022-12-14 ENCOUNTER — OFFICE VISIT (OUTPATIENT)
Dept: FAMILY MEDICINE CLINIC | Age: 57
End: 2022-12-14
Payer: COMMERCIAL

## 2022-12-14 VITALS
WEIGHT: 235 LBS | TEMPERATURE: 97.5 F | BODY MASS INDEX: 37.77 KG/M2 | HEIGHT: 66 IN | DIASTOLIC BLOOD PRESSURE: 94 MMHG | HEART RATE: 78 BPM | SYSTOLIC BLOOD PRESSURE: 154 MMHG | OXYGEN SATURATION: 99 % | RESPIRATION RATE: 20 BRPM

## 2022-12-14 DIAGNOSIS — G47.33 OBSTRUCTIVE SLEEP APNEA SYNDROME: ICD-10-CM

## 2022-12-14 DIAGNOSIS — E11.65 TYPE 2 DIABETES MELLITUS WITH HYPERGLYCEMIA, WITHOUT LONG-TERM CURRENT USE OF INSULIN (HCC): Primary | ICD-10-CM

## 2022-12-14 DIAGNOSIS — K21.9 GASTROESOPHAGEAL REFLUX DISEASE WITHOUT ESOPHAGITIS: ICD-10-CM

## 2022-12-14 DIAGNOSIS — E78.2 MIXED HYPERLIPIDEMIA: ICD-10-CM

## 2022-12-14 DIAGNOSIS — I10 ESSENTIAL HYPERTENSION: ICD-10-CM

## 2022-12-14 DIAGNOSIS — E66.09 CLASS 2 OBESITY DUE TO EXCESS CALORIES WITHOUT SERIOUS COMORBIDITY WITH BODY MASS INDEX (BMI) OF 37.0 TO 37.9 IN ADULT: ICD-10-CM

## 2022-12-14 DIAGNOSIS — F41.1 GENERALIZED ANXIETY DISORDER: ICD-10-CM

## 2022-12-14 PROCEDURE — 3017F COLORECTAL CA SCREEN DOC REV: CPT | Performed by: INTERNAL MEDICINE

## 2022-12-14 PROCEDURE — G8427 DOCREV CUR MEDS BY ELIG CLIN: HCPCS | Performed by: INTERNAL MEDICINE

## 2022-12-14 PROCEDURE — 3052F HG A1C>EQUAL 8.0%<EQUAL 9.0%: CPT | Performed by: INTERNAL MEDICINE

## 2022-12-14 PROCEDURE — G8482 FLU IMMUNIZE ORDER/ADMIN: HCPCS | Performed by: INTERNAL MEDICINE

## 2022-12-14 PROCEDURE — 99214 OFFICE O/P EST MOD 30 MIN: CPT | Performed by: INTERNAL MEDICINE

## 2022-12-14 PROCEDURE — 1036F TOBACCO NON-USER: CPT | Performed by: INTERNAL MEDICINE

## 2022-12-14 PROCEDURE — 3078F DIAST BP <80 MM HG: CPT | Performed by: INTERNAL MEDICINE

## 2022-12-14 PROCEDURE — G8417 CALC BMI ABV UP PARAM F/U: HCPCS | Performed by: INTERNAL MEDICINE

## 2022-12-14 PROCEDURE — 3074F SYST BP LT 130 MM HG: CPT | Performed by: INTERNAL MEDICINE

## 2022-12-14 PROCEDURE — 2022F DILAT RTA XM EVC RTNOPTHY: CPT | Performed by: INTERNAL MEDICINE

## 2022-12-14 RX ORDER — LANCETS 30 GAUGE
1 EACH MISCELLANEOUS DAILY
Qty: 100 EACH | Refills: 5 | Status: SHIPPED | OUTPATIENT
Start: 2022-12-14

## 2022-12-14 RX ORDER — LOSARTAN POTASSIUM 50 MG/1
50 TABLET ORAL DAILY
Qty: 90 TABLET | Refills: 3 | Status: SHIPPED | OUTPATIENT
Start: 2022-12-14

## 2022-12-14 RX ORDER — BLOOD-GLUCOSE METER
KIT MISCELLANEOUS
Qty: 1 KIT | Refills: 0 | Status: SHIPPED | OUTPATIENT
Start: 2022-12-14

## 2022-12-14 RX ORDER — GLUCOSAMINE HCL/CHONDROITIN SU 500-400 MG
CAPSULE ORAL
Qty: 100 STRIP | Refills: 5 | Status: SHIPPED | OUTPATIENT
Start: 2022-12-14

## 2022-12-14 SDOH — ECONOMIC STABILITY: FOOD INSECURITY: WITHIN THE PAST 12 MONTHS, THE FOOD YOU BOUGHT JUST DIDN'T LAST AND YOU DIDN'T HAVE MONEY TO GET MORE.: PATIENT DECLINED

## 2022-12-14 SDOH — ECONOMIC STABILITY: FOOD INSECURITY: WITHIN THE PAST 12 MONTHS, YOU WORRIED THAT YOUR FOOD WOULD RUN OUT BEFORE YOU GOT MONEY TO BUY MORE.: PATIENT DECLINED

## 2022-12-14 ASSESSMENT — ENCOUNTER SYMPTOMS
CHEST TIGHTNESS: 0
NAUSEA: 0
COUGH: 0
EYE PAIN: 0
VOMITING: 0
ABDOMINAL PAIN: 0
SORE THROAT: 0
CONSTIPATION: 0
RHINORRHEA: 0
BLOOD IN STOOL: 0
DIARRHEA: 0
SHORTNESS OF BREATH: 0

## 2022-12-14 ASSESSMENT — SOCIAL DETERMINANTS OF HEALTH (SDOH): HOW HARD IS IT FOR YOU TO PAY FOR THE VERY BASICS LIKE FOOD, HOUSING, MEDICAL CARE, AND HEATING?: PATIENT DECLINED

## 2022-12-14 NOTE — PROGRESS NOTES
[facility]) Physicians   Internal Medicine     2022  Audrey Days : 1965 Sex: female  Age:57 y.o. Chief Complaint   Patient presents with    Hypertension    Diabetes    Cholesterol Problem    Constipation     Bowels aren't moving like they usually do. HPI:   Patient presents to office for evaluation of the following medical concerns. urgent care (10/22) - n/v, low abdo pain , coivd neg, ua neg cmp incr lft, gluc, cbc neg, lipase neg     - has lump to right paraspinal muscles of lower back. Declines work up at present 2022    - States issues with anxiety. Improved but not controlled. States off lorazepam.  States feels overwhelmed at times. No suicidal thoughts. Following with psychiatry and psychology. Self stopped zoloft. On hydroxyzine as needed (taking twice a day). States some side effects with medications, fatigue.    - States has diabetes. States has not been checking blood suagr States on metformin. No reported side effects. Last A1c was 8.5 (2022)    - States has high blood pressure. Occasionally checking blood pressure at home. Off lisinopril due to dry cough. States also having alopecia. Uncertain if alopecia is lisinopril and or zoloft. Now on losartan     - States has high cholesterol. On atorvastatin. No reported side effects.     - States has MICKI. States not currently wearing mask. Needs new sleep study. States sleeping has improved with medications. Has seen sleep medicine. Last visit per reviewed report sleep med (3/22) - with CPAP treatment if indicated based on sleep study. She is willing to retrial CPAP and an order will be placed following her sleep study results. On hold at present due to cost 2022    - States has GERD symptoms. States trying to watch diet. On tums otc. States symptoms are worsening.     - States hand and wrist pain. States was having swelling, decreased ROM.  States was felt having reaction to new Wellbutrin XR, (2021) stopped med, toradol x 1, prednisone, labs RF,JESUSITA, ESR, CK, CBC, CMP - neg, CRP incr, gluc incr. 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 Had seen ortho hand and injections to b/l wrist (9/2021), (12/2021) and (3/2022). Still taking meloxicam as needed    - States having right shoulder pain. States does repetitive motion. No known truama or injury No swelling. No numbness. States did physical therapy and helped. Had MRI shoulder (8/21) suggesting rotator ciff and tendonosis. Following with ortho. States had injection (9/2021), (12/2021). Unchanged. States doing home exercises. - has obesity.     - States has tremors. Was told essential tremors. Uncontrolled. - States has vitamin D def. On supplement.     - States has swelling to axilla b/l. Nontender to palpation.     - labs form 12/9/2022 reviewed with patient 12/14/2022    Health Maintenance   - immunizations:   Influenza Vaccination - ([id number]), (2022)   Pneumonia Vaccination  Zoster/Shingles Vaccine  Tetanus Vaccination  covid - declines     - Screenings:   Bone Density Scan   Pelvic/Pap Exam  Mammogram - (7/20) - negative      Colonoscopy (3/21) - normal colonoscopy, follow up in 5 yrs     ROS:  Review of Systems   Constitutional:  Negative for appetite change, chills, fever and unexpected weight change. HENT:  Negative for congestion, rhinorrhea and sore throat. Eyes:  Negative for pain and visual disturbance. Respiratory:  Negative for cough, chest tightness and shortness of breath. Cardiovascular:  Negative for chest pain and palpitations. Gastrointestinal:  Negative for abdominal pain, blood in stool, constipation, diarrhea, nausea and vomiting. Genitourinary:  Negative for difficulty urinating, dysuria, frequency, pelvic pain, urgency and vaginal bleeding.    Musculoskeletal:  Negative for arthralgias and myalgias. Skin:  Negative for rash. Neurological:  Negative for dizziness, syncope, weakness, light-headedness, numbness and headaches. Hematological:  Negative for adenopathy. Psychiatric/Behavioral:  Negative for suicidal ideas. The patient is nervous/anxious. Current Outpatient Medications:     blood glucose monitor strips, Test one times a day & as needed for symptoms of irregular blood glucose. May substitute brand for insurance coverage. , Disp: 100 strip, Rfl: 5    glucose monitoring (FREESTYLE FREEDOM) kit, Test one times a day & as needed for symptoms of irregular blood glucose. May substitute brand for insurance coverage. , Disp: 1 kit, Rfl: 0    Lancets MISC, 1 each by Does not apply route daily Test one times a day & as needed for symptoms of irregular blood glucose. May substitute brand for insurance coverage. , Disp: 100 each, Rfl: 5    metFORMIN (GLUCOPHAGE) 1000 MG tablet, Take 1 tablet by mouth 2 times daily (with meals), Disp: 180 tablet, Rfl: 1    losartan (COZAAR) 50 MG tablet, Take 1 tablet by mouth daily, Disp: 90 tablet, Rfl: 3    atorvastatin (LIPITOR) 20 MG tablet, Take 1 tablet by mouth daily, Disp: 90 tablet, Rfl: 3    cyclobenzaprine (FLEXERIL) 10 MG tablet, Take 1 tablet by mouth 3 times daily as needed for Muscle spasms, Disp: 60 tablet, Rfl: 5    hydroCHLOROthiazide (MICROZIDE) 12.5 MG capsule, Take 1 capsule by mouth every morning, Disp: 90 capsule, Rfl: 3    meloxicam (MOBIC) 7.5 MG tablet, 1 tablet daily as needed for hand pain. Take with food. , Disp: 30 tablet, Rfl: 5    vitamin D (ERGOCALCIFEROL) 1.25 MG (33986 UT) CAPS capsule, Take 1 capsule by mouth once a week, Disp: 12 capsule, Rfl: 3    omeprazole (PRILOSEC) 20 MG delayed release capsule, Take 1 capsule by mouth every morning (before breakfast) (Patient taking differently: Take 20 mg by mouth as needed), Disp: 30 capsule, Rfl: 2    hydrOXYzine (VISTARIL) 25 MG capsule, Take 25 mg by mouth 3 times daily as needed for Anxiety , Disp: , Rfl:     sertraline (ZOLOFT) 100 MG tablet, Take 150 mg by mouth daily (Patient not taking: Reported on 2022), Disp: , Rfl:     Allergies   Allergen Reactions    Albuterol      Made her feel funny     Wellbutrin [Bupropion] Swelling and Other (See Comments)     Upper extremity arthralgias       Past Medical History:   Diagnosis Date    Bilateral carpal tunnel syndrome 2021    COVID-19 2020    Diabetes mellitus ([address]     Essential hypertension     Essential tremor     Generalized anxiety disorder 2020    Sleep apnea        Past Surgical History:   Procedure Laterality Date     SECTION      COLONOSCOPY      10 years ago    COLONOSCOPY N/A 3/29/2021    COLORECTAL CANCER SCREENING, NOT HIGH RISK performed by Deniz Loredo MD at [address] [place] [address]        Family History   Problem Relation Age of Onset    Hypothyroidism Mother     Hypotension Mother     Cancer Father         gallbladder  52yr    Hypotension Brother     Heart Attack Other         Uncle on father's side  age 44       Social History     Socioeconomic History    Marital status:      Spouse name: None    Number of children: None    Years of education: None    Highest education level: None   Tobacco Use    Smoking status: Former     Packs/day: 0.75     Years: 20.00     Pack years: 15.00     Types: Cigarettes     Start date: 1998     Quit date: 3/4/2018     Years since quittin.7    Smokeless tobacco: Never   Substance and Sexual Activity    Alcohol use: Yes    Drug use: Never     Social Determinants of Health     Financial Resource Strain: Unknown    Difficulty of Paying Living Expenses: Patient refused   Food Insecurity: Unknown    Worried About [address] in the Last Year: Patient refused    [address] in the Last Year: Patient refused        Vitals:    22 1544   BP: (!) 154/94   Site: Left Upper Arm   Position: Sitting   Cuff Size: Large Adult   Pulse: 78   Resp: 20   Temp: 97.5 °F (36.4 °C)   TempSrc: Temporal   SpO2: 99%   Weight: 235 lb (106.6 kg)   Height: 5' 6\" (1.676 m)       Exam:  Physical Exam  Vitals reviewed. Constitutional:       Appearance: She is well-developed. HENT:      Head: Normocephalic and atraumatic. Right Ear: External ear normal.      Left Ear: External ear normal.   Eyes:      Pupils: Pupils are equal, round, and reactive to light. Neck:      Thyroid: No thyromegaly. Cardiovascular:      Rate and Rhythm: Normal rate and regular rhythm. Heart sounds: Normal heart sounds. No murmur heard. Pulmonary:      Effort: Pulmonary effort is normal.      Breath sounds: Normal breath sounds. No wheezing. Abdominal:      General: Bowel sounds are normal. There is no distension. Palpations: Abdomen is soft. Tenderness: There is no abdominal tenderness. There is no guarding or rebound. Musculoskeletal:         General: Normal range of motion. Cervical back: Normal range of motion and neck supple. Lymphadenopathy:      Cervical: No cervical adenopathy. Skin:     General: Skin is warm and dry. Neurological:      Mental Status: She is alert and oriented to person, place, and time. Cranial Nerves: No cranial nerve deficit.    Psychiatric:         Judgment: Judgment normal.       Assessment and Plan:    Diagnoses and all orders for this visit:    Essential hypertension  - watch diet   - monitor blood pressure   - on lisinopril since DM diagnosis - states dry cough and poss alopecia   - stop lisinopril due to cough   - on losartan   - on hctz   - Elevated today 12/14/2022  - increase losartan to 50mg daily     Type 2 diabetes mellitus with hyperglycemia, without long-term current use of insulin (HCC)  - watch diet   - on metformin   - follow A1c - last was 8.5 (12/2022)   - will increase metformin to 1000mg twice a day (12/2022)   - discussed add on medications     On losartan   Not on statin   Not aspirin   Follows optho     Mixed hyperlipidemia  - watch diet   - recommend statin given Diabetes dx   - will start atorvastatin 10mg     Gastroesophageal reflux disease without esophagitis  - watch diet   - Off PPI   - if not improving GI referral     Bilateral carpal tunnel syndrome  - xray hands and wrists   - EMG suggested mild carpal tunnel   - had injections to b/l wrist x 2 (9/21), (12/21), (3/22)   - improved     Alopecia  - thyroid was normal   - check testosterone and DHE  - has seen endocrinology   - per patient improved and stable   -  (5/21) -hirsutism -order labs, DHEA (dec), , DHEA-S (-), testosterone (-), 17 hydroxyprogesterone (-), cortisol (-)  androstenedione (-), cmp (-), gluc (incr)     Axillary swelling  - states bilateral and non tender  - US was nornmal     Generalized anxiety disorder  - Self stopped oft 150mg per psychiatry   - self stopped  hydroxyzine prn   - off Wellbutrin - had reaction (8/2021)   - on hydroxyzine as needed   - no suicidal thoughts     Obstructive sleep apnea syndrome  - has not been wearing cpap   - will need new sleep study   - to see sleep medicine     Vitamin D deficiency  - on vit D [id number] weekly   - follow labs     Class 2 obesity due to excess calories without serious comorbidity with body mass index (BMI) of 37.0 to 37.9 in adult  - discussed diet and exercise      Acute pain of right shoulder  - recommend exercises   - seen ortho   - had injection (9/2021) and (12/2021)   - improved     Dizziness  - elements of vertigo or poss ortho stasis   - discussed medications   - referral to ENT    - home exercises    Return for check up and review.     Orders Placed This Encounter   Procedures    [facility], [place]     Referral Priority:   Routine     Referral Type:   Eval and Treat     Referral Reason:   Specialty Services Required     Number of Visits Requested:   1     Requested Prescriptions     Signed Prescriptions Disp Refills    blood glucose monitor strips 100 strip 5     Sig: Test one times a day & as needed for symptoms of irregular blood glucose. May substitute brand for insurance coverage. glucose monitoring (FREESTYLE FREEDOM) kit 1 kit 0     Sig: Test one times a day & as needed for symptoms of irregular blood glucose. May substitute brand for insurance coverage. Lancets MISC 100 each 5     Si each by Does not apply route daily Test one times a day & as needed for symptoms of irregular blood glucose. May substitute brand for insurance coverage.     metFORMIN (GLUCOPHAGE) 1000 MG tablet 180 tablet 1     Sig: Take 1 tablet by mouth 2 times daily (with meals)    losartan (COZAAR) 50 MG tablet 90 tablet 3     Sig: Take 1 tablet by mouth daily        Maxine Youssef MD  2022  4:31 PM

## 2023-01-01 ENCOUNTER — TELEPHONE (OUTPATIENT)
Dept: FAMILY MEDICINE CLINIC | Age: 58
End: 2023-01-01

## 2023-01-02 NOTE — TELEPHONE ENCOUNTER
Received notification from diabetes education that they have been trying to reach out to the patient but have tried at least 3 attempts and no return calls.      Please reach out to the patient and encourage scheduling with diabetes education    Thanks

## 2023-01-04 NOTE — TELEPHONE ENCOUNTER
Laurelyn Ahumada returned call to the office, she is going to check with her insurance tomorrow to see if it is a covered service. If they do cover it, she will schedule. If it is not covered, she will not be able to afford to attend diabetic education classes.

## 2023-01-06 ENCOUNTER — TELEPHONE (OUTPATIENT)
Dept: FAMILY MEDICINE CLINIC | Age: 58
End: 2023-01-06

## 2023-01-06 DIAGNOSIS — E11.65 TYPE 2 DIABETES MELLITUS WITH HYPERGLYCEMIA, WITHOUT LONG-TERM CURRENT USE OF INSULIN (HCC): ICD-10-CM

## 2023-01-06 DIAGNOSIS — E11.65 TYPE 2 DIABETES MELLITUS WITH HYPERGLYCEMIA, WITHOUT LONG-TERM CURRENT USE OF INSULIN (HCC): Primary | ICD-10-CM

## 2023-01-06 RX ORDER — GLUCOSAMINE HCL/CHONDROITIN SU 500-400 MG
CAPSULE ORAL
Qty: 100 STRIP | Refills: 11 | Status: SHIPPED | OUTPATIENT
Start: 2023-01-06

## 2023-01-06 RX ORDER — LANCETS 30 GAUGE
1 EACH MISCELLANEOUS DAILY
Qty: 100 EACH | Refills: 5 | Status: SHIPPED | OUTPATIENT
Start: 2023-01-06

## 2023-01-06 RX ORDER — BLOOD-GLUCOSE METER
KIT MISCELLANEOUS
Qty: 1 KIT | Refills: 0 | Status: SHIPPED | OUTPATIENT
Start: 2023-01-06

## 2023-01-06 NOTE — TELEPHONE ENCOUNTER
Orders Placed This Encounter   Procedures    External Referral To Endocrinology     Referral Priority:   Routine     Referral Type:   Eval and Treat     Referral Reason:   Specialty Services Required     Requested Specialty:   Endocrinology     Number of Visits Requested:   1     Referral placed

## 2023-01-06 NOTE — TELEPHONE ENCOUNTER
Rina calling to have the Glucose Testing Supplies sent to [employer] in [facility]. I have these ready to send. Last Appointment:  12/14/2022  Future Appointments   Date Time Provider Carlo Worley   3/20/2023  3:45 PM MD JULIANNE Keita [facility]             Also, her insurance denied the referral for Diabetic Education, but may cover her seeing an Endocrinologist who could help with some diet management. She is going to check with her insurance to see who is covered and call back.

## 2023-01-09 NOTE — TELEPHONE ENCOUNTER
Joe Castillo from Dr [facility] Cast office called, they are not taking new endocrinology patient's. When I looked up the other doctor he is listed as a pediatric endocrinologist. Referral may not be appropriate. Voicemail left for Rina.

## 2023-03-20 ENCOUNTER — OFFICE VISIT (OUTPATIENT)
Dept: FAMILY MEDICINE CLINIC | Age: 58
End: 2023-03-20
Payer: COMMERCIAL

## 2023-03-20 VITALS
SYSTOLIC BLOOD PRESSURE: 114 MMHG | HEART RATE: 67 BPM | BODY MASS INDEX: 36 KG/M2 | DIASTOLIC BLOOD PRESSURE: 74 MMHG | OXYGEN SATURATION: 98 % | RESPIRATION RATE: 18 BRPM | WEIGHT: 224 LBS | HEIGHT: 66 IN | TEMPERATURE: 97.3 F

## 2023-03-20 DIAGNOSIS — R53.83 OTHER FATIGUE: ICD-10-CM

## 2023-03-20 DIAGNOSIS — E11.65 TYPE 2 DIABETES MELLITUS WITH HYPERGLYCEMIA, WITHOUT LONG-TERM CURRENT USE OF INSULIN (HCC): ICD-10-CM

## 2023-03-20 DIAGNOSIS — E55.9 VITAMIN D DEFICIENCY: ICD-10-CM

## 2023-03-20 DIAGNOSIS — G56.03 BILATERAL CARPAL TUNNEL SYNDROME: ICD-10-CM

## 2023-03-20 DIAGNOSIS — G47.33 OBSTRUCTIVE SLEEP APNEA SYNDROME: ICD-10-CM

## 2023-03-20 DIAGNOSIS — F41.1 GENERALIZED ANXIETY DISORDER: ICD-10-CM

## 2023-03-20 DIAGNOSIS — L65.9 ALOPECIA: ICD-10-CM

## 2023-03-20 DIAGNOSIS — I10 ESSENTIAL HYPERTENSION: Primary | ICD-10-CM

## 2023-03-20 DIAGNOSIS — E78.2 MIXED HYPERLIPIDEMIA: ICD-10-CM

## 2023-03-20 DIAGNOSIS — K21.9 GASTROESOPHAGEAL REFLUX DISEASE WITHOUT ESOPHAGITIS: ICD-10-CM

## 2023-03-20 DIAGNOSIS — Z12.31 ENCOUNTER FOR SCREENING MAMMOGRAM FOR MALIGNANT NEOPLASM OF BREAST: ICD-10-CM

## 2023-03-20 PROCEDURE — 3074F SYST BP LT 130 MM HG: CPT | Performed by: INTERNAL MEDICINE

## 2023-03-20 PROCEDURE — G8417 CALC BMI ABV UP PARAM F/U: HCPCS | Performed by: INTERNAL MEDICINE

## 2023-03-20 PROCEDURE — 2022F DILAT RTA XM EVC RTNOPTHY: CPT | Performed by: INTERNAL MEDICINE

## 2023-03-20 PROCEDURE — 1036F TOBACCO NON-USER: CPT | Performed by: INTERNAL MEDICINE

## 2023-03-20 PROCEDURE — G8427 DOCREV CUR MEDS BY ELIG CLIN: HCPCS | Performed by: INTERNAL MEDICINE

## 2023-03-20 PROCEDURE — 3078F DIAST BP <80 MM HG: CPT | Performed by: INTERNAL MEDICINE

## 2023-03-20 PROCEDURE — 99214 OFFICE O/P EST MOD 30 MIN: CPT | Performed by: INTERNAL MEDICINE

## 2023-03-20 PROCEDURE — 3017F COLORECTAL CA SCREEN DOC REV: CPT | Performed by: INTERNAL MEDICINE

## 2023-03-20 PROCEDURE — G8482 FLU IMMUNIZE ORDER/ADMIN: HCPCS | Performed by: INTERNAL MEDICINE

## 2023-03-20 PROCEDURE — 3046F HEMOGLOBIN A1C LEVEL >9.0%: CPT | Performed by: INTERNAL MEDICINE

## 2023-03-20 RX ORDER — HYDROXYZINE 50 MG/1
50 TABLET, FILM COATED ORAL 3 TIMES DAILY PRN
COMMUNITY
Start: 2023-02-20

## 2023-03-20 SDOH — ECONOMIC STABILITY: INCOME INSECURITY: HOW HARD IS IT FOR YOU TO PAY FOR THE VERY BASICS LIKE FOOD, HOUSING, MEDICAL CARE, AND HEATING?: PATIENT DECLINED

## 2023-03-20 SDOH — ECONOMIC STABILITY: HOUSING INSECURITY
IN THE LAST 12 MONTHS, WAS THERE A TIME WHEN YOU DID NOT HAVE A STEADY PLACE TO SLEEP OR SLEPT IN A SHELTER (INCLUDING NOW)?: PATIENT REFUSED

## 2023-03-20 SDOH — ECONOMIC STABILITY: FOOD INSECURITY: WITHIN THE PAST 12 MONTHS, YOU WORRIED THAT YOUR FOOD WOULD RUN OUT BEFORE YOU GOT MONEY TO BUY MORE.: PATIENT DECLINED

## 2023-03-20 SDOH — ECONOMIC STABILITY: FOOD INSECURITY: WITHIN THE PAST 12 MONTHS, THE FOOD YOU BOUGHT JUST DIDN'T LAST AND YOU DIDN'T HAVE MONEY TO GET MORE.: PATIENT DECLINED

## 2023-03-20 ASSESSMENT — PATIENT HEALTH QUESTIONNAIRE - PHQ9
2. FEELING DOWN, DEPRESSED OR HOPELESS: 1
SUM OF ALL RESPONSES TO PHQ QUESTIONS 1-9: 2
SUM OF ALL RESPONSES TO PHQ QUESTIONS 1-9: 2
SUM OF ALL RESPONSES TO PHQ9 QUESTIONS 1 & 2: 2
SUM OF ALL RESPONSES TO PHQ QUESTIONS 1-9: 2
SUM OF ALL RESPONSES TO PHQ QUESTIONS 1-9: 2
1. LITTLE INTEREST OR PLEASURE IN DOING THINGS: 1

## 2023-03-20 ASSESSMENT — ENCOUNTER SYMPTOMS
BLOOD IN STOOL: 0
NAUSEA: 0
VOMITING: 0
EYE PAIN: 0
DIARRHEA: 0
ABDOMINAL PAIN: 0
CONSTIPATION: 0
RHINORRHEA: 0
COUGH: 0
SORE THROAT: 0
SHORTNESS OF BREATH: 0
CHEST TIGHTNESS: 0

## 2023-03-20 NOTE — PROGRESS NOTES
for check up and review and labs.     Orders Placed This Encounter   Procedures    RAGHU RNO DIGITAL SCREEN BILATERAL     PER [address] PROTOCOL     Standing Status:   Future     Standing Expiration Date:   5/20/2024     Order Specific Question:   Reason for exam:     Answer:   screening mammogram    Comprehensive Metabolic Panel     Standing Status:   Future     Standing Expiration Date:   3/20/2024    Magnesium     Standing Status:   Future     Standing Expiration Date:   3/20/2024    Lipid, Fasting     Standing Status:   Future     Standing Expiration Date:   3/20/2024    Hemoglobin A1C     Standing Status:   Future     Standing Expiration Date:   3/19/2024    Vitamin D 25 Hydroxy     Standing Status:   Future     Standing Expiration Date:   3/20/2024    TSH     Standing Status:   Future     Standing Expiration Date:   3/20/2024    Urinalysis     Standing Status:   Future     Standing Expiration Date:   3/20/2024    Microalbumin / Creatinine Urine Ratio     Standing Status:   Future     Standing Expiration Date:   3/19/2024    CBC with Auto Differential     Standing Status:   Future     Standing Expiration Date:   3/20/2024     Requested Prescriptions      No prescriptions requested or ordered in this encounter        Onofre Leonard MD  3/20/2023  4:30 PM

## 2023-04-07 ENCOUNTER — TELEPHONE (OUTPATIENT)
Dept: PRIMARY CARE CLINIC | Age: 58
End: 2023-04-07

## 2023-04-07 NOTE — TELEPHONE ENCOUNTER
Please let the patient know that mammogram per radiology report suggested several coarse calcifications within the right breast.  Overall benign nature    No other suspicious masses, lesions, or other calcifications    Recommending follow-up in 1 year or sooner if any new changes or new concerns    Thanks

## 2023-04-24 DIAGNOSIS — G56.03 BILATERAL CARPAL TUNNEL SYNDROME: ICD-10-CM

## 2023-04-24 RX ORDER — MELOXICAM 7.5 MG/1
TABLET ORAL
Qty: 30 TABLET | Refills: 1 | Status: SHIPPED | OUTPATIENT
Start: 2023-04-24

## 2023-04-24 NOTE — TELEPHONE ENCOUNTER
Last Appointment:  3/20/2023  Future Appointments   Date Time Provider Carlo Worley   6/19/2023  9:00 AM Hawa Norman  [address]

## 2023-06-08 DIAGNOSIS — E11.65 TYPE 2 DIABETES MELLITUS WITH HYPERGLYCEMIA, WITHOUT LONG-TERM CURRENT USE OF INSULIN (HCC): ICD-10-CM

## 2023-06-08 NOTE — TELEPHONE ENCOUNTER
Last Appointment:  3/20/2023  Future Appointments   Date Time Provider Carlo Worley   6/19/2023  9:00 AM Sonido Victor  [address] [facility] [address]

## 2023-06-09 ENCOUNTER — TELEPHONE (OUTPATIENT)
Dept: FAMILY MEDICINE CLINIC | Age: 58
End: 2023-06-09

## 2023-06-09 NOTE — TELEPHONE ENCOUNTER
Patient's mom call in and stated she was in the hospital on May 24th and 25th. Patient's mom stated that patient had stayed with her and missed work. Mom is requesting a doctor's excuse for days patient missed work while she was hospitalized. Is it ok to do letter?

## 2023-06-19 ENCOUNTER — OFFICE VISIT (OUTPATIENT)
Dept: FAMILY MEDICINE CLINIC | Age: 58
End: 2023-06-19
Payer: COMMERCIAL

## 2023-06-19 VITALS
TEMPERATURE: 97 F | WEIGHT: 228 LBS | HEIGHT: 66 IN | HEART RATE: 70 BPM | DIASTOLIC BLOOD PRESSURE: 72 MMHG | SYSTOLIC BLOOD PRESSURE: 108 MMHG | BODY MASS INDEX: 36.64 KG/M2 | RESPIRATION RATE: 18 BRPM | OXYGEN SATURATION: 96 %

## 2023-06-19 DIAGNOSIS — E55.9 VITAMIN D DEFICIENCY: ICD-10-CM

## 2023-06-19 DIAGNOSIS — G47.33 OBSTRUCTIVE SLEEP APNEA SYNDROME: ICD-10-CM

## 2023-06-19 DIAGNOSIS — G56.03 BILATERAL CARPAL TUNNEL SYNDROME: ICD-10-CM

## 2023-06-19 DIAGNOSIS — E11.65 TYPE 2 DIABETES MELLITUS WITH HYPERGLYCEMIA, WITHOUT LONG-TERM CURRENT USE OF INSULIN (HCC): ICD-10-CM

## 2023-06-19 DIAGNOSIS — E78.2 MIXED HYPERLIPIDEMIA: ICD-10-CM

## 2023-06-19 DIAGNOSIS — K21.9 GASTROESOPHAGEAL REFLUX DISEASE WITHOUT ESOPHAGITIS: ICD-10-CM

## 2023-06-19 DIAGNOSIS — R53.83 OTHER FATIGUE: ICD-10-CM

## 2023-06-19 DIAGNOSIS — L65.9 ALOPECIA: ICD-10-CM

## 2023-06-19 DIAGNOSIS — F41.1 GENERALIZED ANXIETY DISORDER: ICD-10-CM

## 2023-06-19 DIAGNOSIS — I10 ESSENTIAL HYPERTENSION: Primary | ICD-10-CM

## 2023-06-19 LAB
AMORPH SED URNS QL MICRO: PRESENT
BACTERIA URNS QL MICRO: ABNORMAL /HPF
BASOPHILS # BLD: 0.04 E9/L (ref 0–0.2)
BASOPHILS NFR BLD: 0.8 % (ref 0–2)
BILIRUB UR QL STRIP: NEGATIVE
CLARITY UR: ABNORMAL
COLOR UR: YELLOW
CREAT UR-MCNC: 138 MG/DL (ref 29–226)
EOSINOPHIL # BLD: 0.09 E9/L (ref 0.05–0.5)
EOSINOPHIL NFR BLD: 1.9 % (ref 0–6)
EPI CELLS #/AREA URNS HPF: ABNORMAL /HPF
ERYTHROCYTE [DISTWIDTH] IN BLOOD BY AUTOMATED COUNT: 14.2 FL (ref 11.5–15)
GLUCOSE UR STRIP-MCNC: NEGATIVE MG/DL
HBA1C MFR BLD: 7.1 % (ref 4–5.6)
HCT VFR BLD AUTO: 42.3 % (ref 34–48)
HGB BLD-MCNC: 14 G/DL (ref 11.5–15.5)
HGB UR QL STRIP: NEGATIVE
IMM GRANULOCYTES # BLD: 0.02 E9/L
IMM GRANULOCYTES NFR BLD: 0.4 % (ref 0–5)
KETONES UR STRIP-MCNC: NEGATIVE MG/DL
LEUKOCYTE ESTERASE UR QL STRIP: NEGATIVE
LYMPHOCYTES # BLD: 1.32 E9/L (ref 1.5–4)
LYMPHOCYTES NFR BLD: 27.3 % (ref 20–42)
MCH RBC QN AUTO: 31.7 PG (ref 26–35)
MCHC RBC AUTO-ENTMCNC: 33.1 % (ref 32–34.5)
MCV RBC AUTO: 95.9 FL (ref 80–99.9)
MICROALBUMIN UR-MCNC: <12 MG/L
MICROALBUMIN/CREAT UR-RTO: ABNORMAL (ref 0–30)
MONOCYTES # BLD: 0.25 E9/L (ref 0.1–0.95)
MONOCYTES NFR BLD: 5.2 % (ref 2–12)
NEUTROPHILS # BLD: 3.12 E9/L (ref 1.8–7.3)
NEUTS SEG NFR BLD: 64.4 % (ref 43–80)
NITRITE UR QL STRIP: NEGATIVE
PH UR STRIP: 5.5 [PH] (ref 5–9)
PLATELET # BLD AUTO: 143 E9/L (ref 130–450)
PMV BLD AUTO: 10.4 FL (ref 7–12)
PROT UR STRIP-MCNC: NEGATIVE MG/DL
RBC # BLD AUTO: 4.41 E12/L (ref 3.5–5.5)
RBC #/AREA URNS HPF: ABNORMAL /HPF (ref 0–2)
SP GR UR STRIP: >=1.03 (ref 1–1.03)
UROBILINOGEN UR STRIP-ACNC: 0.2 E.U./DL
VITAMIN D 25-HYDROXY: 50 NG/ML (ref 30–100)
WBC # BLD: 4.8 E9/L (ref 4.5–11.5)
WBC #/AREA URNS HPF: ABNORMAL /HPF (ref 0–5)

## 2023-06-19 PROCEDURE — 1036F TOBACCO NON-USER: CPT | Performed by: INTERNAL MEDICINE

## 2023-06-19 PROCEDURE — 2022F DILAT RTA XM EVC RTNOPTHY: CPT | Performed by: INTERNAL MEDICINE

## 2023-06-19 PROCEDURE — G8427 DOCREV CUR MEDS BY ELIG CLIN: HCPCS | Performed by: INTERNAL MEDICINE

## 2023-06-19 PROCEDURE — G8417 CALC BMI ABV UP PARAM F/U: HCPCS | Performed by: INTERNAL MEDICINE

## 2023-06-19 PROCEDURE — 3046F HEMOGLOBIN A1C LEVEL >9.0%: CPT | Performed by: INTERNAL MEDICINE

## 2023-06-19 PROCEDURE — 99213 OFFICE O/P EST LOW 20 MIN: CPT | Performed by: INTERNAL MEDICINE

## 2023-06-19 PROCEDURE — 3017F COLORECTAL CA SCREEN DOC REV: CPT | Performed by: INTERNAL MEDICINE

## 2023-06-19 PROCEDURE — 3074F SYST BP LT 130 MM HG: CPT | Performed by: INTERNAL MEDICINE

## 2023-06-19 PROCEDURE — 3078F DIAST BP <80 MM HG: CPT | Performed by: INTERNAL MEDICINE

## 2023-06-19 ASSESSMENT — ENCOUNTER SYMPTOMS
EYE PAIN: 0
SORE THROAT: 0
NAUSEA: 0
RHINORRHEA: 0
ABDOMINAL PAIN: 0
CHEST TIGHTNESS: 0
BLOOD IN STOOL: 0
SHORTNESS OF BREATH: 0
DIARRHEA: 0
CONSTIPATION: 0
COUGH: 0
VOMITING: 0
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
## 2023-06-20 LAB
ALBUMIN SERPL-MCNC: 4.7 G/DL (ref 3.5–5.2)
ALP SERPL-CCNC: 63 U/L (ref 35–104)
ALT SERPL-CCNC: 26 U/L (ref 0–32)
ANION GAP SERPL CALCULATED.3IONS-SCNC: 21 MMOL/L (ref 7–16)
AST SERPL-CCNC: 32 U/L (ref 0–31)
BILIRUB SERPL-MCNC: 0.4 MG/DL (ref 0–1.2)
BUN SERPL-MCNC: 27 MG/DL (ref 6–20)
CALCIUM SERPL-MCNC: 10.1 MG/DL (ref 8.6–10.2)
CHLORIDE SERPL-SCNC: 101 MMOL/L (ref 98–107)
CHOLESTEROL, FASTING: 125 MG/DL (ref 0–199)
CO2 SERPL-SCNC: 19 MMOL/L (ref 22–29)
CREAT SERPL-MCNC: 0.7 MG/DL (ref 0.5–1)
GLUCOSE SERPL-MCNC: 150 MG/DL (ref 74–99)
HDLC SERPL-MCNC: 55 MG/DL
LDL CHOLESTEROL CALCULATED: 51 MG/DL (ref 0–99)
MAGNESIUM SERPL-MCNC: 2.2 MG/DL (ref 1.6–2.6)
POTASSIUM SERPL-SCNC: 4.4 MMOL/L (ref 3.5–5)
PROT SERPL-MCNC: 7.2 G/DL (ref 6.4–8.3)
SODIUM SERPL-SCNC: 141 MMOL/L (ref 132–146)
TRIGLYCERIDE, FASTING: 95 MG/DL (ref 0–149)
TSH SERPL-MCNC: 0.69 UIU/ML (ref 0.27–4.2)
VLDLC SERPL CALC-MCNC: 19 MG/DL

## 2023-06-23 ENCOUNTER — TELEPHONE (OUTPATIENT)
Dept: PRIMARY CARE CLINIC | Age: 58
End: 2023-06-23

## 2023-06-23 NOTE — TELEPHONE ENCOUNTER
Please let the patient know that blood work results showed    Sugar was mildly elevated. Hemoglobin A1c is a measure of 3-month sugar control was improved and considered borderline now at 7.1. Bicarbonate level was borderline decreased of uncertain cause could be related to medication and/or dehydration    BUN level, which is a measure of kidney function, was increased.   Other measures of kidney function including creatinine and GFR were normal this could also be related to dehydration    1 liver function test was borderline increased of uncertain cause or significance    Other electrolytes including magnesium and other liver functions were normal    Blood counts were normal except for nonspecific decrease in lymphocyte count    Cholesterol levels were good and would recommend continued medication    Thyroid levels were normal    Vitamin D levels were normal    Urine analysis showed slight bacteria but otherwise was negative with no evidence of microscopic blood or protein

## 2023-06-23 NOTE — TELEPHONE ENCOUNTER
Reviewed results with patient. She also wanted you to know that she does take hctz at night and had forgotten when she was here. She will continue to take per your recommendations at her appt since BP is well controlled on med. She will bring medications with her to her next appt.

## 2023-07-31 NOTE — LETTER
NOTIFICATION RETURN TO WORK / SCHOOL    12/29/2020    Ms. Braxton ChungMcL[facility] Region[facility]y Lee      To Whom It May Concern:    Perez Gutierrez was tested for COVID-19 on 12/18, and the result was positive. She may return to work on 1/4. I recommend:return without restrictions    If there are questions or concerns, please have the patient contact our office.         Sincerely,      Lawanda Graham M.D.
Statement Selected

## 2023-08-04 ENCOUNTER — TELEPHONE (OUTPATIENT)
Dept: PRIMARY CARE CLINIC | Age: 58
End: 2023-08-04

## 2023-08-04 NOTE — TELEPHONE ENCOUNTER
Unable to reach patient via phone x4 attempts. Completed FMLA forms have been faxed to requested number and scanned into chart. Original documents mailed to patient via [address]

## 2023-08-07 ENCOUNTER — APPOINTMENT (OUTPATIENT)
Dept: GENERAL RADIOLOGY | Age: 58
End: 2023-08-07
Payer: COMMERCIAL

## 2023-08-07 ENCOUNTER — HOSPITAL ENCOUNTER (EMERGENCY)
Age: 58
Discharge: HOME | End: 2023-08-07
Payer: COMMERCIAL

## 2023-08-07 VITALS
DIASTOLIC BLOOD PRESSURE: 84 MMHG | SYSTOLIC BLOOD PRESSURE: 149 MMHG | WEIGHT: 235 LBS | HEIGHT: 66 IN | TEMPERATURE: 97.9 F | RESPIRATION RATE: 14 BRPM | HEART RATE: 71 BPM | BODY MASS INDEX: 37.77 KG/M2 | OXYGEN SATURATION: 98 %

## 2023-08-07 DIAGNOSIS — S80.811A ABRASION OF RIGHT LOWER EXTREMITY, INITIAL ENCOUNTER: ICD-10-CM

## 2023-08-07 DIAGNOSIS — Z23 NEED FOR TETANUS BOOSTER: ICD-10-CM

## 2023-08-07 DIAGNOSIS — S22.32XA CLOSED FRACTURE OF ONE RIB OF LEFT SIDE, INITIAL ENCOUNTER: Primary | ICD-10-CM

## 2023-08-07 PROCEDURE — 90471 IMMUNIZATION ADMIN: CPT | Performed by: NURSE PRACTITIONER

## 2023-08-07 PROCEDURE — 71101 X-RAY EXAM UNILAT RIBS/CHEST: CPT

## 2023-08-07 PROCEDURE — 73590 X-RAY EXAM OF LOWER LEG: CPT

## 2023-08-07 PROCEDURE — 99284 EMERGENCY DEPT VISIT MOD MDM: CPT

## 2023-08-07 PROCEDURE — 90714 TD VACC NO PRESV 7 YRS+ IM: CPT | Performed by: NURSE PRACTITIONER

## 2023-08-07 PROCEDURE — 6360000002 HC RX W HCPCS: Performed by: NURSE PRACTITIONER

## 2023-08-07 PROCEDURE — 6370000000 HC RX 637 (ALT 250 FOR IP): Performed by: NURSE PRACTITIONER

## 2023-08-07 RX ORDER — IBUPROFEN 800 MG/1
800 TABLET ORAL ONCE
Status: COMPLETED | OUTPATIENT
Start: 2023-08-07 | End: 2023-08-07

## 2023-08-07 RX ORDER — IBUPROFEN 800 MG/1
800 TABLET ORAL EVERY 8 HOURS PRN
Qty: 9 TABLET | Refills: 0 | Status: SHIPPED | OUTPATIENT
Start: 2023-08-07 | End: 2023-08-10

## 2023-08-07 RX ORDER — BACITRACIN ZINC 500 [USP'U]/G
OINTMENT TOPICAL ONCE
Status: COMPLETED | OUTPATIENT
Start: 2023-08-07 | End: 2023-08-07

## 2023-08-07 RX ADMIN — CLOSTRIDIUM TETANI TOXOID ANTIGEN (FORMALDEHYDE INACTIVATED) AND CORYNEBACTERIUM DIPHTHERIAE TOXOID ANTIGEN (FORMALDEHYDE INACTIVATED) 0.5 ML: 5; 2 INJECTION, SUSPENSION INTRAMUSCULAR at 08:41

## 2023-08-07 RX ADMIN — BACITRACIN ZINC: 500 OINTMENT TOPICAL at 08:40

## 2023-08-07 RX ADMIN — IBUPROFEN 800 MG: 800 TABLET, FILM COATED ORAL at 08:42

## 2023-08-07 NOTE — ED PROVIDER NOTES
posterior left rib. This is not where her initial rib tenderness was but patient did have some mild tenderness. No acute cardiopulmonary disease. . Consults included none. Results were discussed with patient. Patient was given ibuprofen for their symptoms with moderate improvement. Patient will be discharged home with the following prescriptions, IBuprofen. Discussed appropriate use and potential side effects of starting the prescribed medications. Patient continues to be non-toxic on re-evaluation. Findings were discussed with the patient and reasons to immediately return to the ED were articulated to them. They will follow-up with their PMD and occupational health      Discharge Instructions:   Patient referred to  [address] [facility]  [address] [facility]. [facility] [address]  [phone]  Call   to schedule an appt for follow up in 1-2 days    [address] [place] [address] Emergency Department  [address] [address] [phone]  Go to   If symptoms worsen      MEDICATIONS:   DISCHARGE MEDICATIONS:  New Prescriptions    IBUPROFEN (ADVIL;MOTRIN) 800 MG TABLET    Take 1 tablet by mouth every 8 hours as needed for Pain       DISCONTINUED MEDICATIONS:  Discontinued Medications    No medications on file       Record Review:  Records Reviewed : None       Disposition Considerations: This patient's ED course included: a personal history and physicial examination, re-evaluation prior to disposition, and multiple bedside re-evaluations  This patient has remained hemodynamically stable and improved during their ED course. I emphasized the importance of follow-up with the physician I referred them to in the timeframe recommended. I discussed with the patient emergent symptoms and the need to immediately return to the ER. Written information was included in their discharge instructions.  Additional verbal discharge instructions were also given

## 2023-10-09 ENCOUNTER — TELEPHONE (OUTPATIENT)
Dept: FAMILY MEDICINE CLINIC | Age: 58
End: 2023-10-09

## 2023-10-09 NOTE — TELEPHONE ENCOUNTER
----- Message from Donny Guzman sent at 10/9/2023 10:00 AM EDT -----  Subject: Message to Provider    QUESTIONS  Information for Provider? Patient would like to get in sooner than   November 7. She would need to have after 2:30 appointments after 10/13/23   for the next 3 weeks. ---------------------------------------------------------------------------  --------------  Praveen Meléndez [facility]  [phone]; OK to leave message on voicemail  ---------------------------------------------------------------------------  --------------  SCRIPT ANSWERS  Relationship to Patient?  Self

## 2023-10-09 NOTE — TELEPHONE ENCOUNTER
I tried calling this number 4 times and it says it's disconnected. I called her mom to verify the number and she said the number we have is correct. I told her mom that it keeps telling me it's disconnected and she said she will have her call me tomorrow.

## 2023-10-10 NOTE — TELEPHONE ENCOUNTER
Sangeetha Kenyon called you back today. I am not sure if you were going to fit her in an earlier appt. or not. Please call her back in the morning after 8:00. That is the best time. [phone] is her #. She called here on that phone. Thank you.

## 2023-10-11 NOTE — TELEPHONE ENCOUNTER
I tried calling this number again today and it is still saying that it is disconnected. I had Geovanna Parmar try to call as well, and she got the same recording.

## 2023-10-11 NOTE — TELEPHONE ENCOUNTER
Patient returned call, she is okay w/ appt on 11/7 but left a message in case there was a cancellation. I offered her an appt on 10/17 but she has another appt that day. She has decided to leave 11/7 appt and does not need a call back. I confirmed her phone number and let her know that it says the number is disconnected when we call. She added a home number and will speak to phone company about the issue.

## 2023-11-07 ENCOUNTER — OFFICE VISIT (OUTPATIENT)
Dept: FAMILY MEDICINE CLINIC | Age: 58
End: 2023-11-07

## 2023-11-07 VITALS
TEMPERATURE: 97.9 F | SYSTOLIC BLOOD PRESSURE: 134 MMHG | BODY MASS INDEX: 38.09 KG/M2 | DIASTOLIC BLOOD PRESSURE: 86 MMHG | HEART RATE: 85 BPM | HEIGHT: 66 IN | WEIGHT: 237 LBS | OXYGEN SATURATION: 96 %

## 2023-11-07 DIAGNOSIS — Z23 NEED FOR INFLUENZA VACCINATION: Primary | ICD-10-CM

## 2023-11-07 DIAGNOSIS — I10 ESSENTIAL HYPERTENSION: ICD-10-CM

## 2023-11-07 DIAGNOSIS — G47.33 OBSTRUCTIVE SLEEP APNEA SYNDROME: ICD-10-CM

## 2023-11-07 DIAGNOSIS — F41.1 GENERALIZED ANXIETY DISORDER: ICD-10-CM

## 2023-11-07 DIAGNOSIS — R53.83 OTHER FATIGUE: ICD-10-CM

## 2023-11-07 DIAGNOSIS — E78.2 MIXED HYPERLIPIDEMIA: ICD-10-CM

## 2023-11-07 DIAGNOSIS — Z79.899 LONG TERM CURRENT USE OF THERAPEUTIC DRUG: ICD-10-CM

## 2023-11-07 DIAGNOSIS — M54.50 CHRONIC BILATERAL LOW BACK PAIN WITHOUT SCIATICA: ICD-10-CM

## 2023-11-07 DIAGNOSIS — K21.9 GASTROESOPHAGEAL REFLUX DISEASE WITHOUT ESOPHAGITIS: ICD-10-CM

## 2023-11-07 DIAGNOSIS — G89.29 CHRONIC BILATERAL LOW BACK PAIN WITHOUT SCIATICA: ICD-10-CM

## 2023-11-07 DIAGNOSIS — E11.65 TYPE 2 DIABETES MELLITUS WITH HYPERGLYCEMIA, WITHOUT LONG-TERM CURRENT USE OF INSULIN (HCC): ICD-10-CM

## 2023-11-07 DIAGNOSIS — E55.9 VITAMIN D DEFICIENCY: ICD-10-CM

## 2023-11-07 DIAGNOSIS — N39.41 URGE INCONTINENCE OF URINE: ICD-10-CM

## 2023-11-07 RX ORDER — LOSARTAN POTASSIUM 50 MG/1
50 TABLET ORAL DAILY
Qty: 90 TABLET | Refills: 3 | Status: SHIPPED | OUTPATIENT
Start: 2023-11-07

## 2023-11-07 RX ORDER — HYDROCHLOROTHIAZIDE 12.5 MG/1
12.5 CAPSULE, GELATIN COATED ORAL EVERY MORNING
Qty: 90 CAPSULE | Refills: 3 | Status: SHIPPED | OUTPATIENT
Start: 2023-11-07

## 2023-11-07 RX ORDER — ATORVASTATIN CALCIUM 20 MG/1
20 TABLET, FILM COATED ORAL DAILY
Qty: 90 TABLET | Refills: 3 | Status: SHIPPED | OUTPATIENT
Start: 2023-11-07

## 2023-11-07 RX ORDER — ERGOCALCIFEROL 1.25 MG/1
50000 CAPSULE ORAL WEEKLY
Qty: 12 CAPSULE | Refills: 3 | Status: SHIPPED | OUTPATIENT
Start: 2023-11-07

## 2023-11-07 ASSESSMENT — ENCOUNTER SYMPTOMS
SORE THROAT: 0
BLOOD IN STOOL: 0
CONSTIPATION: 0
SHORTNESS OF BREATH: 0
ABDOMINAL PAIN: 0
COUGH: 0
RHINORRHEA: 0
NAUSEA: 0
DIARRHEA: 0
EYE PAIN: 0
CHEST TIGHTNESS: 0
VOMITING: 0

## 2023-11-07 NOTE — PROGRESS NOTES
benign coarse calcifications seen within the right breast. The largest measures 2.8 by 2.2 cm     Colonoscopy (3/21) - normal colonoscopy, follow up in 5 yrs     ROS:  Review of Systems   Constitutional:  Negative for appetite change, chills, fever and unexpected weight change. HENT:  Negative for congestion, rhinorrhea and sore throat. Eyes:  Negative for pain and visual disturbance. Respiratory:  Negative for cough, chest tightness and shortness of breath. Cardiovascular:  Negative for chest pain and palpitations. Gastrointestinal:  Negative for abdominal pain, blood in stool, constipation, diarrhea, nausea and vomiting. Genitourinary:  Negative for difficulty urinating, dysuria, frequency, pelvic pain, urgency and vaginal bleeding. Musculoskeletal:  Negative for arthralgias and myalgias. Skin:  Negative for rash. Neurological:  Negative for dizziness, syncope, weakness, light-headedness, numbness and headaches. Hematological:  Negative for adenopathy. Psychiatric/Behavioral:  Negative for suicidal ideas. The patient is nervous/anxious. Current Outpatient Medications:     metFORMIN (GLUCOPHAGE) 1000 MG tablet, Take 1 tablet by mouth 2 times daily (with meals), Disp: 180 tablet, Rfl: 1    losartan (COZAAR) 50 MG tablet, Take 1 tablet by mouth daily, Disp: 90 tablet, Rfl: 3    hydroCHLOROthiazide (MICROZIDE) 12.5 MG capsule, Take 1 capsule by mouth every morning, Disp: 90 capsule, Rfl: 3    atorvastatin (LIPITOR) 20 MG tablet, Take 1 tablet by mouth daily, Disp: 90 tablet, Rfl: 3    vitamin D (ERGOCALCIFEROL) 1.25 MG (59948 UT) CAPS capsule, Take 1 capsule by mouth once a week, Disp: 12 capsule, Rfl: 3    meloxicam (MOBIC) 7.5 MG tablet, 1 tablet daily as needed for hand pain. Take with food. , Disp: 30 tablet, Rfl: 1    hydrOXYzine HCl (ATARAX) 50 MG tablet, Take 1 tablet by mouth 3 times daily as needed, Disp: , Rfl:     cyclobenzaprine (FLEXERIL) 10 MG tablet, Take 1 tablet by

## 2024-01-08 ENCOUNTER — OFFICE VISIT (OUTPATIENT)
Dept: FAMILY MEDICINE CLINIC | Age: 59
End: 2024-01-08
Payer: COMMERCIAL

## 2024-01-08 VITALS
HEART RATE: 71 BPM | TEMPERATURE: 97.3 F | DIASTOLIC BLOOD PRESSURE: 72 MMHG | SYSTOLIC BLOOD PRESSURE: 126 MMHG | OXYGEN SATURATION: 97 % | HEIGHT: 66 IN | WEIGHT: 243.2 LBS | BODY MASS INDEX: 39.08 KG/M2

## 2024-01-08 DIAGNOSIS — B96.89 ACUTE BACTERIAL SINUSITIS: Primary | ICD-10-CM

## 2024-01-08 DIAGNOSIS — J01.90 ACUTE BACTERIAL SINUSITIS: Primary | ICD-10-CM

## 2024-01-08 PROCEDURE — 3017F COLORECTAL CA SCREEN DOC REV: CPT | Performed by: STUDENT IN AN ORGANIZED HEALTH CARE EDUCATION/TRAINING PROGRAM

## 2024-01-08 PROCEDURE — G8417 CALC BMI ABV UP PARAM F/U: HCPCS | Performed by: STUDENT IN AN ORGANIZED HEALTH CARE EDUCATION/TRAINING PROGRAM

## 2024-01-08 PROCEDURE — 3078F DIAST BP <80 MM HG: CPT | Performed by: STUDENT IN AN ORGANIZED HEALTH CARE EDUCATION/TRAINING PROGRAM

## 2024-01-08 PROCEDURE — 99214 OFFICE O/P EST MOD 30 MIN: CPT | Performed by: STUDENT IN AN ORGANIZED HEALTH CARE EDUCATION/TRAINING PROGRAM

## 2024-01-08 PROCEDURE — 1036F TOBACCO NON-USER: CPT | Performed by: STUDENT IN AN ORGANIZED HEALTH CARE EDUCATION/TRAINING PROGRAM

## 2024-01-08 PROCEDURE — G8482 FLU IMMUNIZE ORDER/ADMIN: HCPCS | Performed by: STUDENT IN AN ORGANIZED HEALTH CARE EDUCATION/TRAINING PROGRAM

## 2024-01-08 PROCEDURE — G8427 DOCREV CUR MEDS BY ELIG CLIN: HCPCS | Performed by: STUDENT IN AN ORGANIZED HEALTH CARE EDUCATION/TRAINING PROGRAM

## 2024-01-08 PROCEDURE — 3074F SYST BP LT 130 MM HG: CPT | Performed by: STUDENT IN AN ORGANIZED HEALTH CARE EDUCATION/TRAINING PROGRAM

## 2024-01-08 RX ORDER — METHYLPREDNISOLONE 4 MG/1
TABLET ORAL
Qty: 1 KIT | Refills: 0 | Status: SHIPPED | OUTPATIENT
Start: 2024-01-08 | End: 2024-01-14

## 2024-01-08 RX ORDER — DOXYCYCLINE HYCLATE 100 MG
100 TABLET ORAL 2 TIMES DAILY
Qty: 14 TABLET | Refills: 0 | Status: SHIPPED | OUTPATIENT
Start: 2024-01-08 | End: 2024-01-15

## 2024-01-08 ASSESSMENT — ENCOUNTER SYMPTOMS
SINUS PRESSURE: 1
RHINORRHEA: 1
ABDOMINAL PAIN: 0
NAUSEA: 0
VOMITING: 0
SINUS PAIN: 1
SHORTNESS OF BREATH: 0
FACIAL SWELLING: 1
COUGH: 0

## 2024-01-08 NOTE — PROGRESS NOTES
Rina Andre (:  1965) is a 58 y.o. female,Established patient, here for evaluation of the following chief complaint(s):  Sinus Problem (Swollen face, sinus pressure, pressure in the back of her head; feels like her head is going to explode; sinuses are draining a little bit this morning, she did take a zyrtec) and Headache         ASSESSMENT/PLAN:  1. Acute bacterial sinusitis  -     doxycycline hyclate (VIBRA-TABS) 100 MG tablet; Take 1 tablet by mouth 2 times daily for 7 days, Disp-14 tablet, R-0Normal  -     methylPREDNISolone (MEDROL DOSEPACK) 4 MG tablet; Take by mouth., Disp-1 kit, R-0Normal  Had some systemic symptoms. Will treat. Hold off on steroids for 2 days and start if not improving.Advised to check glucose more frequently while on steroids. Discussed benefits and risks She is in agreement.    Return if symptoms worsen or fail to improve.         Subjective   SUBJECTIVE/OBJECTIVE:  L sided facial swelling and pressure  Slightly dizzy  Some aches  Headache  Had some diaphoresis yesterday  Symptoms started gradually. At least a month ago  Has rhinorrhea  Took a zyrtec today  Took some advil and tylneol          Review of Systems   Constitutional:  Positive for chills and diaphoresis. Negative for fever.   HENT:  Positive for facial swelling, rhinorrhea, sinus pressure and sinus pain. Negative for congestion.    Respiratory:  Negative for cough and shortness of breath.    Cardiovascular:  Negative for chest pain and leg swelling.   Gastrointestinal:  Negative for abdominal pain, nausea and vomiting.   Genitourinary:  Negative for dysuria and hematuria.   Musculoskeletal:  Negative for arthralgias and myalgias.   Skin:  Negative for rash and wound.   Neurological:  Negative for dizziness and light-headedness.          Objective   Physical Exam  Vitals reviewed.   Constitutional:       General: She is not in acute distress.  HENT:      Head: Normocephalic and atraumatic.      Comments:

## 2024-01-09 ENCOUNTER — OFFICE VISIT (OUTPATIENT)
Dept: FAMILY MEDICINE CLINIC | Age: 59
End: 2024-01-09
Payer: COMMERCIAL

## 2024-01-09 VITALS
BODY MASS INDEX: 38.09 KG/M2 | TEMPERATURE: 97.5 F | SYSTOLIC BLOOD PRESSURE: 130 MMHG | WEIGHT: 237 LBS | OXYGEN SATURATION: 98 % | HEART RATE: 68 BPM | DIASTOLIC BLOOD PRESSURE: 76 MMHG | RESPIRATION RATE: 16 BRPM | HEIGHT: 66 IN

## 2024-01-09 DIAGNOSIS — K11.20 PAROTIDITIS: Primary | ICD-10-CM

## 2024-01-09 PROCEDURE — 3075F SYST BP GE 130 - 139MM HG: CPT | Performed by: PHYSICIAN ASSISTANT

## 2024-01-09 PROCEDURE — G8427 DOCREV CUR MEDS BY ELIG CLIN: HCPCS | Performed by: PHYSICIAN ASSISTANT

## 2024-01-09 PROCEDURE — G8482 FLU IMMUNIZE ORDER/ADMIN: HCPCS | Performed by: PHYSICIAN ASSISTANT

## 2024-01-09 PROCEDURE — G8417 CALC BMI ABV UP PARAM F/U: HCPCS | Performed by: PHYSICIAN ASSISTANT

## 2024-01-09 PROCEDURE — 3017F COLORECTAL CA SCREEN DOC REV: CPT | Performed by: PHYSICIAN ASSISTANT

## 2024-01-09 PROCEDURE — 99213 OFFICE O/P EST LOW 20 MIN: CPT | Performed by: PHYSICIAN ASSISTANT

## 2024-01-09 PROCEDURE — 1036F TOBACCO NON-USER: CPT | Performed by: PHYSICIAN ASSISTANT

## 2024-01-09 PROCEDURE — 3078F DIAST BP <80 MM HG: CPT | Performed by: PHYSICIAN ASSISTANT

## 2024-01-09 RX ORDER — AMOXICILLIN AND CLAVULANATE POTASSIUM 875; 125 MG/1; MG/1
1 TABLET, FILM COATED ORAL 2 TIMES DAILY
Qty: 20 TABLET | Refills: 0 | Status: SHIPPED | OUTPATIENT
Start: 2024-01-09 | End: 2024-01-19

## 2024-01-09 NOTE — PROGRESS NOTES
Chief Complaint   Oral Swelling      History of Present Illness   Source of history provided by: patient.      Rina Andre is a 58 y.o. old female presenting to the walk in clinic for evaluation of swelling overlying her left mandible which has been present since yesterday.  Patient was seen in our clinic yesterday and treated for suspected sinusitis with a course of doxycycline as she was also having nasal congestion and sinus pressure at that time.  She was also prescribed a Medrol Dosepak but advised to hold this for 2 days as she is diabetic.  Patient is concerned because her swelling seems to have progressed.  Denies any difficulty breathing or swallowing.  Denies any associated dental pain.  Patient reports that she was evaluated by her dentist in office this morning who assured her this was not a dental issue.  Patient denies any sore throat, fever, chills, HA, ear pain, or recent illness. Pt is able to handle his/her own secretions and drink fluids without difficulty.                                              Onset:       Spontaneous:   no.     Following Trauma:   no.     Previous Caries:   no.     Recent Dental Procedure:   no.     ROS    Unless otherwise stated in this report or unable to obtain because of the patient's clinical or mental status as evidenced by the medical record, this patients's positive and negative responses for Review of Systems, constitutional, psych, eyes, ENT, cardiovascular, respiratory, gastrointestinal, neurological, genitourinary, musculoskeletal, integument systems and systems related to the presenting problem are either stated in the preceding or were not pertinent or were negative for the symptoms and/or complaints related to the medical problem.    Past Medical History:  has a past medical history of Bilateral carpal tunnel syndrome, COVID-19, Diabetes mellitus (HCC), Essential hypertension, Essential tremor, Generalized anxiety disorder, and Sleep apnea.  Past

## 2024-01-18 ENCOUNTER — HOSPITAL ENCOUNTER (EMERGENCY)
Age: 59
Discharge: HOME | End: 2024-01-18
Attending: EMERGENCY MEDICINE
Payer: COMMERCIAL

## 2024-01-18 ENCOUNTER — APPOINTMENT (OUTPATIENT)
Dept: CT IMAGING | Age: 59
End: 2024-01-18
Payer: COMMERCIAL

## 2024-01-18 ENCOUNTER — OFFICE VISIT (OUTPATIENT)
Dept: FAMILY MEDICINE CLINIC | Age: 59
End: 2024-01-18
Payer: COMMERCIAL

## 2024-01-18 VITALS
TEMPERATURE: 97.6 F | HEIGHT: 66 IN | BODY MASS INDEX: 38.09 KG/M2 | WEIGHT: 237 LBS | HEART RATE: 84 BPM | DIASTOLIC BLOOD PRESSURE: 82 MMHG | OXYGEN SATURATION: 98 % | SYSTOLIC BLOOD PRESSURE: 144 MMHG

## 2024-01-18 VITALS
BODY MASS INDEX: 38.09 KG/M2 | SYSTOLIC BLOOD PRESSURE: 165 MMHG | HEART RATE: 84 BPM | OXYGEN SATURATION: 98 % | TEMPERATURE: 98.1 F | WEIGHT: 237 LBS | RESPIRATION RATE: 18 BRPM | DIASTOLIC BLOOD PRESSURE: 92 MMHG | HEIGHT: 66 IN

## 2024-01-18 DIAGNOSIS — R22.1 NECK SWELLING: ICD-10-CM

## 2024-01-18 DIAGNOSIS — K11.20 SIALOADENITIS: ICD-10-CM

## 2024-01-18 DIAGNOSIS — R22.0 FACIAL SWELLING: Primary | ICD-10-CM

## 2024-01-18 DIAGNOSIS — R13.10 DYSPHAGIA, UNSPECIFIED TYPE: ICD-10-CM

## 2024-01-18 DIAGNOSIS — R22.0 FACIAL SWELLING: ICD-10-CM

## 2024-01-18 LAB
ALBUMIN SERPL-MCNC: 4.1 G/DL (ref 3.5–5.2)
ALP SERPL-CCNC: 84 U/L (ref 35–104)
ALT SERPL-CCNC: 36 U/L (ref 0–32)
ANION GAP SERPL CALCULATED.3IONS-SCNC: 9 MMOL/L (ref 7–16)
AST SERPL-CCNC: 26 U/L (ref 0–31)
BASOPHILS # BLD: 0.03 K/UL (ref 0–0.2)
BASOPHILS NFR BLD: 1 % (ref 0–2)
BILIRUB SERPL-MCNC: 0.5 MG/DL (ref 0–1.2)
BUN SERPL-MCNC: 16 MG/DL (ref 6–20)
CALCIUM SERPL-MCNC: 9 MG/DL (ref 8.6–10.2)
CHLORIDE SERPL-SCNC: 101 MMOL/L (ref 98–107)
CO2 SERPL-SCNC: 28 MMOL/L (ref 22–29)
CREAT SERPL-MCNC: 0.5 MG/DL (ref 0.5–1)
EOSINOPHIL # BLD: 0.11 K/UL (ref 0.05–0.5)
EOSINOPHILS RELATIVE PERCENT: 2 % (ref 0–6)
ERYTHROCYTE [DISTWIDTH] IN BLOOD BY AUTOMATED COUNT: 12.9 % (ref 11.5–15)
GFR SERPL CREATININE-BSD FRML MDRD: >60 ML/MIN/1.73M2
GLUCOSE SERPL-MCNC: 246 MG/DL (ref 74–99)
HCT VFR BLD AUTO: 41.3 % (ref 34–48)
HGB BLD-MCNC: 13.6 G/DL (ref 11.5–15.5)
IMM GRANULOCYTES # BLD AUTO: 0.06 K/UL (ref 0–0.58)
IMM GRANULOCYTES NFR BLD: 1 % (ref 0–5)
LYMPHOCYTES NFR BLD: 1.52 K/UL (ref 1.5–4)
LYMPHOCYTES RELATIVE PERCENT: 28 % (ref 20–42)
MAGNESIUM SERPL-MCNC: 2.1 MG/DL (ref 1.6–2.6)
MCH RBC QN AUTO: 29.8 PG (ref 26–35)
MCHC RBC AUTO-ENTMCNC: 32.9 G/DL (ref 32–34.5)
MCV RBC AUTO: 90.4 FL (ref 80–99.9)
MONOCYTES NFR BLD: 0.31 K/UL (ref 0.1–0.95)
MONOCYTES NFR BLD: 6 % (ref 2–12)
NEUTROPHILS NFR BLD: 63 % (ref 43–80)
NEUTS SEG NFR BLD: 3.47 K/UL (ref 1.8–7.3)
PLATELET, FLUORESCENCE: 146 K/UL (ref 130–450)
PMV BLD AUTO: 9.8 FL (ref 7–12)
POTASSIUM SERPL-SCNC: 3.7 MMOL/L (ref 3.5–5)
PROT SERPL-MCNC: 6.8 G/DL (ref 6.4–8.3)
RBC # BLD AUTO: 4.57 M/UL (ref 3.5–5.5)
SODIUM SERPL-SCNC: 138 MMOL/L (ref 132–146)
WBC OTHER # BLD: 5.5 K/UL (ref 4.5–11.5)

## 2024-01-18 PROCEDURE — 70491 CT SOFT TISSUE NECK W/DYE: CPT

## 2024-01-18 PROCEDURE — G8417 CALC BMI ABV UP PARAM F/U: HCPCS | Performed by: INTERNAL MEDICINE

## 2024-01-18 PROCEDURE — 1036F TOBACCO NON-USER: CPT | Performed by: INTERNAL MEDICINE

## 2024-01-18 PROCEDURE — G8482 FLU IMMUNIZE ORDER/ADMIN: HCPCS | Performed by: INTERNAL MEDICINE

## 2024-01-18 PROCEDURE — 99285 EMERGENCY DEPT VISIT HI MDM: CPT

## 2024-01-18 PROCEDURE — 83735 ASSAY OF MAGNESIUM: CPT

## 2024-01-18 PROCEDURE — 3017F COLORECTAL CA SCREEN DOC REV: CPT | Performed by: INTERNAL MEDICINE

## 2024-01-18 PROCEDURE — 3079F DIAST BP 80-89 MM HG: CPT | Performed by: INTERNAL MEDICINE

## 2024-01-18 PROCEDURE — 80053 COMPREHEN METABOLIC PANEL: CPT

## 2024-01-18 PROCEDURE — 6360000004 HC RX CONTRAST MEDICATION: Performed by: RADIOLOGY

## 2024-01-18 PROCEDURE — 3077F SYST BP >= 140 MM HG: CPT | Performed by: INTERNAL MEDICINE

## 2024-01-18 PROCEDURE — 99213 OFFICE O/P EST LOW 20 MIN: CPT | Performed by: INTERNAL MEDICINE

## 2024-01-18 PROCEDURE — G8427 DOCREV CUR MEDS BY ELIG CLIN: HCPCS | Performed by: INTERNAL MEDICINE

## 2024-01-18 PROCEDURE — 85025 COMPLETE CBC W/AUTO DIFF WBC: CPT

## 2024-01-18 RX ADMIN — IOPAMIDOL 75 ML: 755 INJECTION, SOLUTION INTRAVENOUS at 21:48

## 2024-01-18 ASSESSMENT — PAIN DESCRIPTION - FREQUENCY: FREQUENCY: CONTINUOUS

## 2024-01-18 ASSESSMENT — ENCOUNTER SYMPTOMS
SINUS PAIN: 0
TROUBLE SWALLOWING: 1
FACIAL SWELLING: 1
WHEEZING: 0
ABDOMINAL PAIN: 0
EYES NEGATIVE: 1
COUGH: 0
COLOR CHANGE: 1
SHORTNESS OF BREATH: 0
SINUS PRESSURE: 0
SORE THROAT: 0
NAUSEA: 0
CHEST TIGHTNESS: 0

## 2024-01-18 ASSESSMENT — PAIN DESCRIPTION - DESCRIPTORS: DESCRIPTORS: DISCOMFORT;SORE

## 2024-01-18 ASSESSMENT — PAIN DESCRIPTION - PAIN TYPE: TYPE: ACUTE PAIN

## 2024-01-18 ASSESSMENT — PAIN DESCRIPTION - LOCATION: LOCATION: THROAT

## 2024-01-18 ASSESSMENT — PAIN SCALES - GENERAL: PAINLEVEL_OUTOF10: 2

## 2024-01-18 ASSESSMENT — PAIN DESCRIPTION - ONSET: ONSET: ON-GOING

## 2024-01-18 ASSESSMENT — PAIN - FUNCTIONAL ASSESSMENT: PAIN_FUNCTIONAL_ASSESSMENT: 0-10

## 2024-01-18 ASSESSMENT — PAIN DESCRIPTION - ORIENTATION: ORIENTATION: LEFT

## 2024-01-18 NOTE — ED NOTES
Department of Emergency Medicine  FIRST PROVIDER TRIAGE NOTE             Independent MLP           1/18/24  5:22 PM EST    Date of Encounter: 1/18/24   MRN: [id number]      HPI: Rina Andre is a 58 y.o. female who presents to the ED for Facial Swelling (Left sided facial and neck swelling. On atb since jan 9th. And steriod.  More difficult to swallow now.)     Patient is a 58-year-old presenting with facial swelling that started about January 9.  Patient was placed on a steroid and Z-Rik and states that since the finishing of her antibiotics and steroids she is feeling that the facial swelling is returning.  Patient states that she is able to breathe fine and her airway is completely patent but swallowing feels \"weird.\"  Patient states she feels like she needs to tell her self to swallow.    ROS: Negative for cp, sob, abd pain, or back pain.    PE: Gen Appearance/Constitutional: alert  HEENT: NC/NT. PERRLA,  Airway patent.  Neck: supple.  Patient's airway is completely patent this was viewed by myself no tonsillar swelling, uvula swelling or airway compromise     Initial Plan of Care: All treatment areas with department are currently occupied. Plan to order/Initiate the following while awaiting opening in ED: imaging studies.  Initiate Treatment-Testing, Proceed toTreatment Area When Bed Available for ED Attending/MLP to Continue Care    Electronically signed by Angie Zarate PA-C   DD: 1/18/24       Angie Zarate PA-C  01/18/24 [id number]

## 2024-01-18 NOTE — PROGRESS NOTES
[facility]     24  Rina Andre : 1965 Sex: female  Age: 58 y.o.    Chief Complaint   Patient presents with    Facial Swelling     Left sided facial swelling; under left eye, nose and cheek       HPI  Patient presents to express care today complaining of increasing neck and facial swelling on the left side.  She was in express care on  and was felt to have a sinus infection at that time and was given a prescription for doxycycline and steroids.  She was told to hold the steroid for couple days and see how the antibiotic did as she is diabetic.  Next day swelling got much worse she came in again to express care and was seen and at that time was felt to have parotiditis was placed on Augmentin.  The doxycycline was stopped.  She started the steroids.  She was told to use lemon drops.  States she did show improvement for several days although still had somewhat of a funny feeling to her left neck which she had a hard time describing.  States she woke up today swelling is worse again and she is having pain to the left neck and left face/cheek denies any fever or chills..  She states that now she has a funny feeling when she swallows like there is something there.          Review of Systems   Constitutional:  Negative for chills and fever.   HENT:  Positive for facial swelling and trouble swallowing. Negative for congestion, ear pain, postnasal drip, sinus pressure, sinus pain and sore throat.    Eyes: Negative.    Respiratory:  Negative for cough, chest tightness, shortness of breath and wheezing.    Cardiovascular:  Negative for chest pain.   Gastrointestinal:  Negative for abdominal pain and nausea.   Endocrine:        Type 2 diabetes   Musculoskeletal:  Positive for neck pain.   Skin:  Positive for color change.        Some redness to left face.   Neurological:  Negative for numbness and headaches.                 REST OF PERTINENT ROS GONE OVER AND WAS NEGATIVE.

## 2024-01-19 NOTE — ED PROVIDER NOTES
[facility] EMERGENCY DEPARTMENT  EMERGENCY DEPARTMENT ENCOUNTER      Pt Name: Rina Andre  MRN: [id number]  Birthdate 1965  Date of evaluation: 1/18/2024  Provider: Osbaldo Ricketts MD  PCP: Graham Glasgow MD  Note Started: 8:56 PM EST 1/18/24    CHIEF COMPLAINT       Chief Complaint   Patient presents with    Facial Swelling     Left sided facial and neck swelling. On abx since jan 9th. And steriod.  More difficult to swallow now.       HISTORY OF PRESENT ILLNESS: 1 or more Elements   History From: Patient  Limitations to history : None    Rina Andre is a 58 y.o. female who presents with complaints of left-sided facial swelling onset January 9.  Patient reports in January that she was feeling generally unwell with sleep earlier today and next morning she woke and had left-sided facial swelling.  She reports that she was seen at urgent care couple of times was initially placed on an antibiotic which was switched Augmentin then also given steroids which she is finishing.  She reports she still feeling a left-sided facial swelling and has no shortness of breath or problems breathing however does have to make a conscious effort to swallow sometimes.  Denies dysphagia.  Denies drooling.  Denies cough, coryza.  Denies facial droop or any sensory deficits.  Currently denies vomiting, fever, chills, chest pain, shortness of breath, dizziness, weakness, numbness, tingling in extremities, dysuria, diarrhea, constipation.    Nursing Notes were all reviewed and agreed with or any disagreements were addressed in the HPI.    REVIEW OF SYSTEMS :    Positives and Pertinent negatives as per HPI.     PAST MEDICAL HISTORY/Chronic Conditions Affecting Care    has a past medical history of Bilateral carpal tunnel syndrome (8/17/2021), COVID-19 (12/14/2020), Diabetes mellitus (HCC), Essential hypertension, Essential tremor, Generalized anxiety disorder (7/20/2020), and Sleep  History:   Diagnosis Date    Bilateral carpal tunnel syndrome 8/17/2021    COVID-19 12/14/2020    Diabetes mellitus (HCC)     Essential hypertension     Essential tremor     Generalized anxiety disorder 7/20/2020    Sleep apnea        CONSULTS: (Who and What was discussed)  None    Discussion with Other Profesionals : None    Social Determinants : None    Records Reviewed : Outpatient Notes reviewed outpatient walk-in clinic notes from 1/18/2024, patient was evaluated for left-sided facial swelling on her left side nose and cheek, she was seen [facility] initially in January with and felt to have sinus infection was given doxycycline and steroids, steroids were held, swelling got worse, seen again felt to have parotitis was placed on Augmentin, doxycycline stopped, started steroids, no improvement and told to present here.    CC/HPI Summary, DDx, ED Course, and Reassessment: CBC is ordered to evaluate for any signs of infection or inflammation by obtaining a WBC count, or any signs of acute anemia by interpreting hemoglobin. CMP for any electrolyte imbalances, kidney function, hepatic injury or any elevations in anion gap. CT neck and soft tissue for any abscesses or any bony fractures of the face.    58 y.o. female who presents with complaints of left-sided facial swelling onset January 9.  Patient reports in January that she was feeling generally unwell with sleep earlier today and next morning she woke and had left-sided facial swelling.  She reports that she was seen at urgent care couple of times was initially placed on an antibiotic which was switched Augmentin then also given steroids which she is finishing.  She reports she still feeling a left-sided facial swelling and has no shortness of breath or problems breathing however does have to make a conscious effort to swallow sometimes.  Denies dysphagia.  Denies drooling.  Denies cough, coryza.  Denies facial droop or any sensory deficits.  Currently denies

## 2024-01-29 ENCOUNTER — OFFICE VISIT (OUTPATIENT)
Dept: FAMILY MEDICINE CLINIC | Age: 59
End: 2024-01-29
Payer: COMMERCIAL

## 2024-01-29 VITALS
WEIGHT: 237 LBS | HEIGHT: 66 IN | BODY MASS INDEX: 38.09 KG/M2 | TEMPERATURE: 97.3 F | HEART RATE: 79 BPM | OXYGEN SATURATION: 97 % | RESPIRATION RATE: 18 BRPM | SYSTOLIC BLOOD PRESSURE: 138 MMHG | DIASTOLIC BLOOD PRESSURE: 84 MMHG

## 2024-01-29 DIAGNOSIS — R22.0 FACIAL SWELLING: Primary | ICD-10-CM

## 2024-01-29 PROCEDURE — G8482 FLU IMMUNIZE ORDER/ADMIN: HCPCS | Performed by: INTERNAL MEDICINE

## 2024-01-29 PROCEDURE — 99213 OFFICE O/P EST LOW 20 MIN: CPT | Performed by: INTERNAL MEDICINE

## 2024-01-29 PROCEDURE — 1036F TOBACCO NON-USER: CPT | Performed by: INTERNAL MEDICINE

## 2024-01-29 PROCEDURE — 3079F DIAST BP 80-89 MM HG: CPT | Performed by: INTERNAL MEDICINE

## 2024-01-29 PROCEDURE — 3017F COLORECTAL CA SCREEN DOC REV: CPT | Performed by: INTERNAL MEDICINE

## 2024-01-29 PROCEDURE — 3075F SYST BP GE 130 - 139MM HG: CPT | Performed by: INTERNAL MEDICINE

## 2024-01-29 PROCEDURE — G8417 CALC BMI ABV UP PARAM F/U: HCPCS | Performed by: INTERNAL MEDICINE

## 2024-01-29 PROCEDURE — G8427 DOCREV CUR MEDS BY ELIG CLIN: HCPCS | Performed by: INTERNAL MEDICINE

## 2024-01-29 RX ORDER — ARIPIPRAZOLE 2 MG/1
2 TABLET ORAL NIGHTLY
COMMUNITY
Start: 2024-01-22

## 2024-01-29 ASSESSMENT — ENCOUNTER SYMPTOMS
CHEST TIGHTNESS: 0
BLOOD IN STOOL: 0
DIARRHEA: 0
COUGH: 0
RHINORRHEA: 0
EYE PAIN: 0
SORE THROAT: 0
ABDOMINAL PAIN: 0
CONSTIPATION: 0
SHORTNESS OF BREATH: 0
VOMITING: 0
NAUSEA: 0

## 2024-01-29 NOTE — PROGRESS NOTES
- was referred to endocrinology (1/2023) - did not schedule  - follow A1c - last was 7.1 (6/2023)    - discussed glp-1 to also help with weight loss     On losartan   Not on statin   Not aspirin   Follows optho     Mixed hyperlipidemia  - watch diet   - recommend statin given Diabetes dx   - on atorvastatin 20mg   - follow labs     Gastroesophageal reflux disease without esophagitis  - watch diet   - Off PPI   - if not improving GI referral   - stable     Bilateral carpal tunnel syndrome  - xray hands and wrists   - EMG suggested mild carpal tunnel   - had injections to b/l wrist x 2 (9/21), (12/21), (3/22)   - stable     Alopecia  - thyroid was normal   - check testosterone and DHE  - has seen endocrinology   - per patient improved and stable   -  (5/21) -hirsutism -order labs, DHEA (dec), , DHEA-S (-), testosterone (-), 17 hydroxyprogesterone (-), cortisol (-)  androstenedione (-), cmp (-), gluc (incr)     Axillary swelling  - states bilateral and non tender  - US was normal   - unchanged     Generalized anxiety disorder  - Self stopped zoft 150mg per psychiatry   - self stopped  hydroxyzine prn   - off Wellbutrin - had reaction (8/2021)   - on hydroxyzine as needed   - no suicidal thoughts   - has restarted zoloft and improved 1/29/2024    Obstructive sleep apnea syndrome  - has not been wearing cpap   - will need new sleep study   - to see sleep medicine     Vitamin D deficiency  - on vit D [id number] weekly   - follow labs     Class 2 obesity due to excess calories without serious comorbidity with body mass index (BMI) of 37.0 to 37.9 in adult  - discussed diet and exercise      Acute pain of right shoulder  - recommend exercises   - seen ortho   - had injection (9/2021) and (12/2021)   - improved     Dizziness  - elements of vertigo or poss ortho stasis   - discussed medications   - referral to ENT    - home exercises    Return in about 3 months (around 4/29/2024) for check up and review and labs.    No

## 2024-03-21 ENCOUNTER — APPOINTMENT (OUTPATIENT)
Dept: GENERAL RADIOLOGY | Age: 59
End: 2024-03-21
Payer: COMMERCIAL

## 2024-03-21 ENCOUNTER — HOSPITAL ENCOUNTER (EMERGENCY)
Age: 59
Discharge: HOME | End: 2024-03-21
Payer: COMMERCIAL

## 2024-03-21 ENCOUNTER — APPOINTMENT (OUTPATIENT)
Dept: CT IMAGING | Age: 59
End: 2024-03-21
Payer: COMMERCIAL

## 2024-03-21 VITALS
DIASTOLIC BLOOD PRESSURE: 82 MMHG | BODY MASS INDEX: 38.82 KG/M2 | RESPIRATION RATE: 18 BRPM | TEMPERATURE: 97.2 F | HEART RATE: 69 BPM | OXYGEN SATURATION: 98 % | SYSTOLIC BLOOD PRESSURE: 110 MMHG | HEIGHT: 65 IN | WEIGHT: 233 LBS

## 2024-03-21 DIAGNOSIS — S20.212A CONTUSION OF LEFT CHEST WALL, INITIAL ENCOUNTER: Primary | ICD-10-CM

## 2024-03-21 DIAGNOSIS — S80.02XA CONTUSION OF LEFT KNEE, INITIAL ENCOUNTER: ICD-10-CM

## 2024-03-21 DIAGNOSIS — S50.812A ABRASION OF LEFT FOREARM, INITIAL ENCOUNTER: ICD-10-CM

## 2024-03-21 PROCEDURE — 73562 X-RAY EXAM OF KNEE 3: CPT

## 2024-03-21 PROCEDURE — 99284 EMERGENCY DEPT VISIT MOD MDM: CPT

## 2024-03-21 PROCEDURE — 71250 CT THORAX DX C-: CPT

## 2024-03-21 PROCEDURE — 6370000000 HC RX 637 (ALT 250 FOR IP): Performed by: NURSE PRACTITIONER

## 2024-03-21 RX ORDER — OXYCODONE HYDROCHLORIDE AND ACETAMINOPHEN 5; 325 MG/1; MG/1
1 TABLET ORAL ONCE
Status: COMPLETED | OUTPATIENT
Start: 2024-03-21 | End: 2024-03-21

## 2024-03-21 RX ORDER — IBUPROFEN 800 MG/1
800 TABLET ORAL EVERY 8 HOURS PRN
Qty: 30 TABLET | Refills: 0 | Status: SHIPPED | OUTPATIENT
Start: 2024-03-21

## 2024-03-21 RX ORDER — OXYCODONE HYDROCHLORIDE AND ACETAMINOPHEN 5; 325 MG/1; MG/1
1 TABLET ORAL EVERY 6 HOURS PRN
Qty: 12 TABLET | Refills: 0 | Status: SHIPPED | OUTPATIENT
Start: 2024-03-21 | End: 2024-03-24

## 2024-03-21 RX ADMIN — OXYCODONE AND ACETAMINOPHEN 1 TABLET: 5; 325 TABLET ORAL at 17:28

## 2024-03-21 RX ADMIN — OXYCODONE AND ACETAMINOPHEN 1 TABLET: 5; 325 TABLET ORAL at 20:51

## 2024-03-21 ASSESSMENT — PAIN DESCRIPTION - ORIENTATION
ORIENTATION: RIGHT
ORIENTATION: LEFT

## 2024-03-21 ASSESSMENT — PAIN SCALES - GENERAL
PAINLEVEL_OUTOF10: 8
PAINLEVEL_OUTOF10: 6
PAINLEVEL_OUTOF10: 6

## 2024-03-21 ASSESSMENT — PAIN DESCRIPTION - DESCRIPTORS: DESCRIPTORS: ACHING;SHARP

## 2024-03-21 ASSESSMENT — PAIN - FUNCTIONAL ASSESSMENT: PAIN_FUNCTIONAL_ASSESSMENT: 0-10

## 2024-03-21 ASSESSMENT — PAIN DESCRIPTION - LOCATION
LOCATION: KNEE;RIB CAGE
LOCATION: RIB CAGE

## 2024-03-21 NOTE — ED TRIAGE NOTES
Department of Emergency Medicine    FIRST PROVIDER TRIAGE NOTE             Independent MLP           3/21/24  4:30 PM EDT    Date of Encounter: 3/21/24   MRN: [id number]    Vitals:    03/21/24 1630 03/21/24 1728   BP: 103/84    Pulse: 78    Resp: 16 16   Temp: 97.2 °F (36.2 °C)    TempSrc: Temporal    SpO2: 97%    Weight: 105.7 kg (233 lb)    Height: 1.651 m (5' 5\")         HPI: Rina Andre is a 58 y.o. female who presents to the ED for Rib Injury (Left rib, axillary injury.  Fall today onto left side. Pain worsens with deep breath and movement. ) and Knee Injury (Left knee.  Work injury today)     Denies hitting head     ROS: Negative for abd pain, headache, or dizziness.    Physical Exam:   Gen Appearance/Constitutional: alert  CV: regular rate     Initial Plan of Care: All treatment areas with department are currently occupied.     Plan to order/Initiate the following while awaiting opening in ED: Triage evaluation  imaging studies.    Initial Plan of Care: Initiate Treatment-Testing, Proceed toTreatment Area When Bed Available for ED Attending/MLP to Continue Care    Electronically signed by Lucila Blanco PA-C   DD: 3/21/24

## 2024-03-22 NOTE — DISCHARGE INSTRUCTIONS
CT CHEST WO CONTRAST   Final Result   No acute cardiopulmonary process.         XR KNEE LEFT (3 VIEWS)   Final Result   No acute abnormality of the knee.            Your x-rays and CAT scans were all normal, there are no fractures.  You likely are bruised on your left side.  Ice to the areas 20 minutes on, 20 minutes off.  Ibuprofen every 8 hours as needed for pain.  Please follow-up with occupational medicine if symptoms do not improve.  You may use an incentive spirometer every 2 hours while awake

## 2024-03-22 NOTE — ED PROVIDER NOTES
Independent JENNY Visit.       [facility]  Department of Emergency Medicine   ED  Encounter Note  Admit Date/RoomTime: 3/21/2024  5:03 PM  ED Room:     NAME: Rina Andre  : 1965  MRN: [id number]     Chief Complaint:  Rib Injury (Left rib, axillary injury.  Fall today onto left side. Pain worsens with deep breath and movement. ) and Knee Injury (Left knee.  Work injury today)    History of Present Illness       Rina Andre is a 58 y.o. old female who presents to the emergency department by private vehicle, for a mechanical fall which occured 1 hour(s) prior to arrival. She reportedly tripped over a hitch on a truck while in the parking lot at work prior to incident with complaints of left anterior and axillary rib pain and left knee pain.    The patients tetanus status is up to date.   Since onset the symptoms have been remaining constant.  Her pain is aggraveated by any movement or deep breathing and relieved by nothing, as no treatment has been provided prior to this visit.  She denies any head injury, headache, loss of consciousness, confusion, dizziness, neck pain, abdominal pain, back pain, numbness, weakness, blurred vision, nausea, or vomiting.  She takes no blood thinning agents.    ROS   Pertinent positives and negatives are stated within HPI, all other systems reviewed and are negative.    Past Medical History:  has a past medical history of Bilateral carpal tunnel syndrome, COVID-19, Diabetes mellitus (HCC), Essential hypertension, Essential tremor, Generalized anxiety disorder, and Sleep apnea.    Surgical History:  has a past surgical history that includes Femur Surgery (Right);  section; Colonoscopy; and Colonoscopy (N/A, 3/29/2021).    Social History:  reports that she quit smoking about 6 years ago. Her smoking use included cigarettes. She started smoking about 25 years ago. She has a 15.0 pack-year smoking history. She has never used smokeless

## 2024-04-08 ENCOUNTER — HOSPITAL ENCOUNTER (OUTPATIENT)
Dept: GENERAL RADIOLOGY | Age: 59
Discharge: HOME | End: 2024-04-10
Payer: COMMERCIAL

## 2024-04-08 ENCOUNTER — HOSPITAL ENCOUNTER (OUTPATIENT)
Age: 59
Discharge: HOME | End: 2024-04-10
Payer: COMMERCIAL

## 2024-04-08 DIAGNOSIS — S20.212A CONTUSION OF LEFT FRONT WALL OF THORAX, INITIAL ENCOUNTER: ICD-10-CM

## 2024-04-08 PROCEDURE — 71101 X-RAY EXAM UNILAT RIBS/CHEST: CPT

## 2024-06-09 ENCOUNTER — TELEPHONE (OUTPATIENT)
Dept: FAMILY MEDICINE CLINIC | Age: 59
End: 2024-06-09

## 2024-06-09 NOTE — TELEPHONE ENCOUNTER
Received notification that patient may be due to fill medications    Reviewed patient's chart last office visit was in January and was scheduled for 3-month follow-up in April however patient canceled and has not rescheduled.     Please reach out to the patient and see if she would like to reschedule appointment for follow-up, checkup and refill medications if needed    Thanks

## 2024-06-10 NOTE — TELEPHONE ENCOUNTER
Patient lost her job and therefore lost her insurance.  She did get a new job, but states it will be about 3 months before she will have insurance through them.      I was going over her meds with her to see if she needs any refills and it appears that everything we give her is good through November 2024.

## (undated) DEVICE — SPONGE GZ W4XL4IN RAYON POLY FILL CVR W/ NONWOVEN FAB

## (undated) DEVICE — GRADUATE TRIANG MEASURE 1000ML BLK PRNT